# Patient Record
Sex: FEMALE | Race: WHITE | Employment: FULL TIME | URBAN - NONMETROPOLITAN AREA
[De-identification: names, ages, dates, MRNs, and addresses within clinical notes are randomized per-mention and may not be internally consistent; named-entity substitution may affect disease eponyms.]

---

## 2017-03-14 ENCOUNTER — TELEPHONE (OUTPATIENT)
Dept: OBGYN | Age: 27
End: 2017-03-14

## 2017-04-24 ENCOUNTER — TELEPHONE (OUTPATIENT)
Dept: OBGYN | Age: 27
End: 2017-04-24

## 2017-10-31 RX ORDER — DROSPIRENONE AND ETHINYL ESTRADIOL 0.03MG-3MG
1 KIT ORAL DAILY
COMMUNITY
End: 2022-12-23

## 2017-10-31 RX ORDER — ALBUTEROL SULFATE 90 UG/1
2 AEROSOL, METERED RESPIRATORY (INHALATION) EVERY 4 HOURS PRN
COMMUNITY

## 2017-10-31 RX ORDER — BROMPHENIRAMINE MALEATE, PSEUDOEPHEDRINE HYDROCHLORIDE, AND DEXTROMETHORPHAN HYDROBROMIDE 2; 30; 10 MG/5ML; MG/5ML; MG/5ML
SYRUP ORAL 4 TIMES DAILY PRN
COMMUNITY
End: 2022-12-23

## 2017-11-07 ENCOUNTER — OFFICE VISIT (OUTPATIENT)
Dept: OBGYN | Age: 27
End: 2017-11-07
Payer: COMMERCIAL

## 2017-11-07 VITALS
HEART RATE: 97 BPM | WEIGHT: 183 LBS | TEMPERATURE: 99.5 F | DIASTOLIC BLOOD PRESSURE: 87 MMHG | HEIGHT: 63 IN | SYSTOLIC BLOOD PRESSURE: 133 MMHG | BODY MASS INDEX: 32.43 KG/M2

## 2017-11-07 DIAGNOSIS — Z12.4 SCREENING FOR CERVICAL CANCER: ICD-10-CM

## 2017-11-07 DIAGNOSIS — Z01.419 WELL FEMALE EXAM WITH ROUTINE GYNECOLOGICAL EXAM: Primary | ICD-10-CM

## 2017-11-07 DIAGNOSIS — N92.6 IRREGULAR PERIODS: ICD-10-CM

## 2017-11-07 LAB
CONTROL: NORMAL
PREGNANCY TEST URINE, POC: NEGATIVE

## 2017-11-07 PROCEDURE — 99395 PREV VISIT EST AGE 18-39: CPT | Performed by: OBSTETRICS & GYNECOLOGY

## 2017-11-07 PROCEDURE — 81025 URINE PREGNANCY TEST: CPT | Performed by: OBSTETRICS & GYNECOLOGY

## 2017-11-07 ASSESSMENT — ENCOUNTER SYMPTOMS
EYES NEGATIVE: 1
RESPIRATORY NEGATIVE: 1
GASTROINTESTINAL NEGATIVE: 1

## 2017-11-07 NOTE — PATIENT INSTRUCTIONS
Patient Education        Well Visit, Ages 25 to 48: Care Instructions  Your Care Instructions  Physical exams can help you stay healthy. Your doctor has checked your overall health and may have suggested ways to take good care of yourself. He or she also may have recommended tests. At home, you can help prevent illness with healthy eating, regular exercise, and other steps. Follow-up care is a key part of your treatment and safety. Be sure to make and go to all appointments, and call your doctor if you are having problems. It's also a good idea to know your test results and keep a list of the medicines you take. How can you care for yourself at home? · Reach and stay at a healthy weight. This will lower your risk for many problems, such as obesity, diabetes, heart disease, and high blood pressure. · Get at least 30 minutes of physical activity on most days of the week. Walking is a good choice. You also may want to do other activities, such as running, swimming, cycling, or playing tennis or team sports. Discuss any changes in your exercise program with your doctor. · Do not smoke or allow others to smoke around you. If you need help quitting, talk to your doctor about stop-smoking programs and medicines. These can increase your chances of quitting for good. · Talk to your doctor about whether you have any risk factors for sexually transmitted infections (STIs). Having one sex partner (who does not have STIs and does not have sex with anyone else) is a good way to avoid these infections. · Use birth control if you do not want to have children at this time. Talk with your doctor about the choices available and what might be best for you. · Protect your skin from too much sun. When you're outdoors from 10 a.m. to 4 p.m., stay in the shade or cover up with clothing and a hat with a wide brim. Wear sunglasses that block UV rays.  Even when it's cloudy, put broad-spectrum sunscreen (SPF 30 or higher) on any exposed skin. · See a dentist one or two times a year for checkups and to have your teeth cleaned. · Wear a seat belt in the car. · Drink alcohol in moderation, if at all. That means no more than 2 drinks a day for men and 1 drink a day for women. Follow your doctor's advice about when to have certain tests. These tests can spot problems early. For everyone  · Cholesterol. Have the fat (cholesterol) in your blood tested after age 21. Your doctor will tell you how often to have this done based on your age, family history, or other things that can increase your risk for heart disease. · Blood pressure. Have your blood pressure checked during a routine doctor visit. Your doctor will tell you how often to check your blood pressure based on your age, your blood pressure results, and other factors. · Vision. Talk with your doctor about how often to have a glaucoma test.  · Diabetes. Ask your doctor whether you should have tests for diabetes. · Colon cancer. Have a test for colon cancer at age 48. You may have one of several tests. If you are younger than 48, you may need a test earlier if you have any risk factors. Risk factors include whether you already had a precancerous polyp removed from your colon or whether your parent, brother, sister, or child has had colon cancer. For women  · Breast exam and mammogram. Talk to your doctor about when you should have a clinical breast exam and a mammogram. Medical experts differ on whether and how often women under 50 should have these tests. Your doctor can help you decide what is right for you. · Pap test and pelvic exam. Begin Pap tests at age 24. A Pap test is the best way to find cervical cancer. The test often is part of a pelvic exam. Ask how often to have this test.  · Tests for sexually transmitted infections (STIs). Ask whether you should have tests for STIs.  You may be at risk if you have sex with more than one person, especially if your partners do not wear condoms. For men  · Tests for sexually transmitted infections (STIs). Ask whether you should have tests for STIs. You may be at risk if you have sex with more than one person, especially if you do not wear a condom. · Testicular cancer exam. Ask your doctor whether you should check your testicles regularly. · Prostate exam. Talk to your doctor about whether you should have a blood test (called a PSA test) for prostate cancer. Experts differ on whether and when men should have this test. Some experts suggest it if you are older than 39 and are -American or have a father or brother who got prostate cancer when he was younger than 72. When should you call for help? Watch closely for changes in your health, and be sure to contact your doctor if you have any problems or symptoms that concern you. Where can you learn more? Go to https://STAR FESTIVALpelalyeb.healthDatacastle. org and sign in to your [employer] account. Enter P072 in the Minglebox box to learn more about \"Well Visit, Ages 25 to 48: Care Instructions. \"     If you do not have an account, please click on the \"Sign Up Now\" link. Current as of: July 19, 2016  Content Version: 11.3  © 0413-9389 [employer]. Care instructions adapted under license by [facility] ([facility]). If you have questions about a medical condition or this instruction, always ask your healthcare professional. [address]  any warranty or liability for your use of this information.

## 2017-11-10 ENCOUNTER — TELEPHONE (OUTPATIENT)
Dept: OBGYN | Age: 27
End: 2017-11-10

## 2017-11-10 DIAGNOSIS — R79.89 ELEVATED PROLACTIN LEVEL: Primary | ICD-10-CM

## 2017-11-10 RX ORDER — ERGOCALCIFEROL 1.25 MG/1
50000 CAPSULE ORAL WEEKLY
Qty: 8 CAPSULE | Refills: 0 | Status: SHIPPED | OUTPATIENT
Start: 2017-11-10 | End: 2017-12-21 | Stop reason: ALTCHOICE

## 2017-11-15 ENCOUNTER — TELEPHONE (OUTPATIENT)
Dept: OBGYN | Age: 27
End: 2017-11-15

## 2017-11-16 ENCOUNTER — HOSPITAL ENCOUNTER (OUTPATIENT)
Dept: ULTRASOUND IMAGING | Age: 27
Discharge: HOME | End: 2017-11-16
Payer: COMMERCIAL

## 2017-11-16 DIAGNOSIS — N92.6 IRREGULAR PERIODS: ICD-10-CM

## 2017-11-16 PROCEDURE — 76830 TRANSVAGINAL US NON-OB: CPT

## 2017-12-08 ENCOUNTER — OFFICE VISIT (OUTPATIENT)
Dept: OBGYN | Age: 27
End: 2017-12-08
Payer: COMMERCIAL

## 2017-12-08 VITALS
WEIGHT: 188 LBS | SYSTOLIC BLOOD PRESSURE: 139 MMHG | BODY MASS INDEX: 33.31 KG/M2 | HEIGHT: 63 IN | DIASTOLIC BLOOD PRESSURE: 90 MMHG | HEART RATE: 101 BPM

## 2017-12-08 DIAGNOSIS — O36.80X0 ENCOUNTER TO DETERMINE FETAL VIABILITY OF PREGNANCY, SINGLE OR UNSPECIFIED FETUS: ICD-10-CM

## 2017-12-08 DIAGNOSIS — N91.2 AMENORRHEA: Primary | ICD-10-CM

## 2017-12-08 LAB
CONTROL: ABNORMAL
PREGNANCY TEST URINE, POC: ABNORMAL

## 2017-12-08 PROCEDURE — 81025 URINE PREGNANCY TEST: CPT | Performed by: NURSE PRACTITIONER

## 2017-12-08 PROCEDURE — 99213 OFFICE O/P EST LOW 20 MIN: CPT | Performed by: NURSE PRACTITIONER

## 2017-12-08 ASSESSMENT — ENCOUNTER SYMPTOMS
RESPIRATORY NEGATIVE: 1
EYES NEGATIVE: 1
GASTROINTESTINAL NEGATIVE: 1

## 2017-12-08 NOTE — PROGRESS NOTES
high level of mercury, such as shark, swordfish, or mackerel. Do not eat more than one can of tuna each week. ? · Drink plenty of fluids, enough so that your urine is light yellow or clear like water. If you have kidney, heart, or liver disease and have to limit fluids, talk with your doctor before you increase the amount of fluids you drink. ? · Cut down on caffeine, such as coffee, tea, and cola. ? · Do not drink alcohol, such as beer, wine, or hard liquor. ? · Take a multivitamin that contains at least 400 micrograms (mcg) of folic acid to help prevent birth defects. Fortified cereal and whole wheat bread are good additional sources of folic acid. ? · Increase the calcium in your diet. Try to drink a quart of skim milk each day. You may also take calcium supplements and choose foods such as cheese and yogurt. ? Lifestyle  ? · Make sure you go to your follow-up appointments. ? · Get plenty of rest. You may be unusually tired while you are pregnant. ? · Get at least 30 minutes of exercise on most days of the week. Walking is a good choice. If you have not exercised in the past, start out slowly. Take several short walks each day. ? · Do not smoke. If you need help quitting, talk to your doctor about stop-smoking programs. These can increase your chances of quitting for good. ? · Do not touch cat feces or litter boxes. Also, wash your hands after you handle raw meat, and fully cook all meat before you eat it. Wear gloves when you work in the yard or garden, and wash your hands well when you are done. Cat feces, raw or undercooked meat, and contaminated dirt can cause an infection that may harm your baby or lead to a miscarriage. ? · Do not use saunas or hot tubs. Raising your body temperature may harm your baby. ? · Avoid chemical fumes, paint fumes, or poisons. ? · Do not use illegal drugs or alcohol. Medicines  ? · Review all of your medicines with your doctor.  Some of your routine on the \"Sign Up Now\" link. Current as of: March 16, 2017  Content Version: 11.4  © 4555-6374 [employer], Incorporated. Care instructions adapted under license by [facility] ([facility]). If you have questions about a medical condition or this instruction, always ask your healthcare professional. [place] [address] any warranty or liability for your use of this information.

## 2017-12-08 NOTE — PATIENT INSTRUCTIONS
Patient Education        Learning About Pregnancy  Your Care Instructions    Your health in the early weeks of your pregnancy is particularly important for your baby's health. Take good care of yourself. Anything you do that harms your body can also harm your baby. Make sure to go to all of your doctor appointments. Regular checkups will help keep you and your baby healthy. How can you care for yourself at home? Diet  ? · Eat a balanced diet. Make sure your diet includes plenty of beans, peas, and leafy green vegetables. ? · Do not skip meals or go for many hours without eating. If you are nauseated, try to eat a small, healthy snack every 2 to 3 hours. ? · Do not eat fish that has a high level of mercury, such as shark, swordfish, or mackerel. Do not eat more than one can of tuna each week. ? · Drink plenty of fluids, enough so that your urine is light yellow or clear like water. If you have kidney, heart, or liver disease and have to limit fluids, talk with your doctor before you increase the amount of fluids you drink. ? · Cut down on caffeine, such as coffee, tea, and cola. ? · Do not drink alcohol, such as beer, wine, or hard liquor. ? · Take a multivitamin that contains at least 400 micrograms (mcg) of folic acid to help prevent birth defects. Fortified cereal and whole wheat bread are good additional sources of folic acid. ? · Increase the calcium in your diet. Try to drink a quart of skim milk each day. You may also take calcium supplements and choose foods such as cheese and yogurt. ? Lifestyle  ? · Make sure you go to your follow-up appointments. ? · Get plenty of rest. You may be unusually tired while you are pregnant. ? · Get at least 30 minutes of exercise on most days of the week. Walking is a good choice. If you have not exercised in the past, start out slowly. Take several short walks each day. ? · Do not smoke.  If you need help quitting, talk to your doctor about stop-smoking programs. These can increase your chances of quitting for good. ? · Do not touch cat feces or litter boxes. Also, wash your hands after you handle raw meat, and fully cook all meat before you eat it. Wear gloves when you work in the yard or garden, and wash your hands well when you are done. Cat feces, raw or undercooked meat, and contaminated dirt can cause an infection that may harm your baby or lead to a miscarriage. ? · Do not use saunas or hot tubs. Raising your body temperature may harm your baby. ? · Avoid chemical fumes, paint fumes, or poisons. ? · Do not use illegal drugs or alcohol. Medicines  ? · Review all of your medicines with your doctor. Some of your routine medicines may need to be changed to protect your baby. ? · Use acetaminophen (Tylenol) to relieve minor problems, such as a mild headache or backache or a mild fever with cold symptoms. Do not use nonsteroidal anti-inflammatory drugs (NSAIDs), such as ibuprofen (Advil, Motrin) or naproxen (Aleve), unless your doctor says it is okay. ? · Do not take two or more pain medicines at the same time unless the doctor told you to. Many pain medicines have acetaminophen, which is Tylenol. Too much acetaminophen (Tylenol) can be harmful. ? · Take your medicines exactly as prescribed. Call your doctor if you think you are having a problem with your medicine. ?To manage morning sickness  ? · If you feel sick when you first wake up, try eating a small snack (such as crackers) before you get out of bed. Allow some time to digest the snack, and then get out of bed slowly. ? · Do not skip meals or go for long periods without eating. An empty stomach can make nausea worse. ? · Eat small, frequent meals instead of three large meals each day. ? · Drink plenty of fluids. Sports drinks, such as Gatorade or Powerade, are good choices. ? · Eat foods that are high in protein but low in fat.    ? · If you are taking iron supplements, ask your

## 2017-12-20 ENCOUNTER — LAB (OUTPATIENT)
Dept: LAB | Facility: HOSPITAL | Age: 27
End: 2017-12-20

## 2017-12-20 ENCOUNTER — TRANSCRIBE ORDERS (OUTPATIENT)
Dept: ADMINISTRATIVE | Facility: HOSPITAL | Age: 27
End: 2017-12-20

## 2017-12-20 DIAGNOSIS — R30.0 DYSURIA: Primary | ICD-10-CM

## 2017-12-20 DIAGNOSIS — R30.0 DYSURIA: ICD-10-CM

## 2017-12-20 LAB
BACTERIA UR QL AUTO: ABNORMAL /HPF
BILIRUB UR QL STRIP: NEGATIVE
CLARITY UR: CLEAR
COLOR UR: YELLOW
GLUCOSE UR STRIP-MCNC: NEGATIVE MG/DL
HGB UR QL STRIP.AUTO: ABNORMAL
HYALINE CASTS UR QL AUTO: ABNORMAL /LPF
KETONES UR QL STRIP: NEGATIVE
LEUKOCYTE ESTERASE UR QL STRIP.AUTO: NEGATIVE
NITRITE UR QL STRIP: NEGATIVE
PH UR STRIP.AUTO: 6.5 [PH] (ref 5–8)
PROT UR QL STRIP: NEGATIVE
RBC # UR: ABNORMAL /HPF
REF LAB TEST METHOD: ABNORMAL
SP GR UR STRIP: <=1.005 (ref 1–1.03)
SQUAMOUS #/AREA URNS HPF: ABNORMAL /HPF
UROBILINOGEN UR QL STRIP: ABNORMAL
WBC UR QL AUTO: ABNORMAL /HPF

## 2017-12-20 PROCEDURE — 81001 URINALYSIS AUTO W/SCOPE: CPT

## 2017-12-21 ENCOUNTER — HOSPITAL ENCOUNTER (OUTPATIENT)
Dept: ULTRASOUND IMAGING | Age: 27
Discharge: HOME | End: 2017-12-21
Payer: COMMERCIAL

## 2017-12-21 ENCOUNTER — ROUTINE PRENATAL (OUTPATIENT)
Dept: OBGYN | Age: 27
End: 2017-12-21

## 2017-12-21 VITALS
BODY MASS INDEX: 32.59 KG/M2 | HEART RATE: 107 BPM | DIASTOLIC BLOOD PRESSURE: 89 MMHG | SYSTOLIC BLOOD PRESSURE: 133 MMHG | WEIGHT: 184 LBS

## 2017-12-21 DIAGNOSIS — O36.80X0 ENCOUNTER TO DETERMINE FETAL VIABILITY OF PREGNANCY, SINGLE OR UNSPECIFIED FETUS: ICD-10-CM

## 2017-12-21 DIAGNOSIS — R31.9 HEMATURIA, UNSPECIFIED TYPE: ICD-10-CM

## 2017-12-21 DIAGNOSIS — Z3A.01 6 WEEKS GESTATION OF PREGNANCY: ICD-10-CM

## 2017-12-21 DIAGNOSIS — Z34.01 SUPERVISION OF NORMAL FIRST PREGNANCY IN FIRST TRIMESTER: ICD-10-CM

## 2017-12-21 DIAGNOSIS — Z34.01 SUPERVISION OF NORMAL FIRST PREGNANCY IN FIRST TRIMESTER: Primary | ICD-10-CM

## 2017-12-21 LAB — GONADOTROPIN, CHORIONIC (HCG) QUANT: 6120 MIU/ML (ref 0–5.3)

## 2017-12-21 PROCEDURE — 0500F INITIAL PRENATAL CARE VISIT: CPT | Performed by: NURSE PRACTITIONER

## 2017-12-21 PROCEDURE — 76817 TRANSVAGINAL US OBSTETRIC: CPT

## 2017-12-21 NOTE — PROGRESS NOTES
Pt presents today for routine prenatal exam. She denies any vaginal bleeding, leaking, or cramping. Could not get heartbeat on US today-is scheduled for another 1/2/18. She is c/o seeing light pink in toilet. No bleeding. She does have some cramping. She did a urine yesterday that showed large blood. She does have a history of UTIs. Will check HCG with NOB labs today. Will repeat HCG tomorrow due to holidays and for peace of mind for pt. Will send urine for a culture as well. Pt to call if any changes or bleeding.

## 2017-12-21 NOTE — PATIENT INSTRUCTIONS
folate. ¨ Dairy products, tofu, canned fish with bones, almonds, broccoli, dark leafy greens, corn tortillas, and fortified orange juice are good sources of calcium. ¨ Beef, poultry, liver, spinach, lentils, dried beans, fortified cereals, and dried fruits are rich in iron. ¨ Dark leafy greens, broccoli, asparagus, liver, fortified cereals, orange juice, peanuts, and almonds are good sources of folate. · Avoid foods that could harm your baby. ¨ Do not eat raw or undercooked meat, chicken, or fish (such as sushi or raw oysters). ¨ Do not eat raw eggs or foods that contain raw eggs, such as Caesar dressing. ¨ Do not eat soft cheeses and unpasteurized dairy foods, such as Brie, feta, or blue cheese. ¨ Do not eat fish that contains a lot of mercury, such as shark, swordfish, tilefish, or twin mackerel. Do not eat more than 6 ounces of tuna each week. ¨ Do not eat raw sprouts, especially alfalfa sprouts. ¨ Cut down on caffeine, such as coffee, tea, and cola. Protect yourself and your baby  · Do not touch anabela litter or cat feces. They can cause an infection that could harm your baby. · High body temperature can be harmful to your baby. So if you want to use a sauna or hot tub, be sure to talk to your doctor about how to use it safely. [place] with morning sickness  · Sip small amounts of water, juices, or shakes. Try drinking between meals, not with meals. · Eat 5 or 6 small meals a day. Try dry toast or crackers when you first get up, and eat breakfast a little later. · Avoid spicy, greasy, and fatty foods. · When you feel sick, open your windows or go for a short walk to get fresh air. · Try nausea wristbands. These help some women. · Tell your doctor if you think your prenatal vitamins make you sick. Where can you learn more? Go to https://carmencita.healthmobile melting [employer]. org and sign in to your MyMusic account.  Enter G112 in the [employer] Apps box to learn more about \"Weeks 6 to 10 of Your Pregnancy: Care Instructions. \"     If you do not have an account, please click on the \"Sign Up Now\" link. Current as of: March 16, 2017  Content Version: 11.4  © 9173-3262 Healthwise, Incorporated. Care instructions adapted under license by [facility] ([facility]). If you have questions about a medical condition or this instruction, always ask your healthcare professional. [address] any warranty or liability for your use of this information.

## 2017-12-22 ENCOUNTER — TELEPHONE (OUTPATIENT)
Dept: OBGYN | Age: 27
End: 2017-12-22

## 2017-12-22 DIAGNOSIS — Z34.01 SUPERVISION OF NORMAL FIRST PREGNANCY IN FIRST TRIMESTER: ICD-10-CM

## 2017-12-22 DIAGNOSIS — R31.9 HEMATURIA, UNSPECIFIED TYPE: ICD-10-CM

## 2017-12-22 LAB — GONADOTROPIN, CHORIONIC (HCG) QUANT: 7010 MIU/ML (ref 0–5.3)

## 2018-01-02 ENCOUNTER — ROUTINE PRENATAL (OUTPATIENT)
Dept: OBGYN | Age: 28
End: 2018-01-02

## 2018-01-02 ENCOUNTER — HOSPITAL ENCOUNTER (OUTPATIENT)
Dept: ULTRASOUND IMAGING | Age: 28
Discharge: HOME | End: 2018-01-02
Payer: COMMERCIAL

## 2018-01-02 VITALS — SYSTOLIC BLOOD PRESSURE: 140 MMHG | BODY MASS INDEX: 33.18 KG/M2 | WEIGHT: 187.3 LBS | DIASTOLIC BLOOD PRESSURE: 87 MMHG

## 2018-01-02 DIAGNOSIS — O36.80X0 ENCOUNTER TO DETERMINE FETAL VIABILITY OF PREGNANCY, SINGLE OR UNSPECIFIED FETUS: ICD-10-CM

## 2018-01-02 DIAGNOSIS — Z3A.08 8 WEEKS GESTATION OF PREGNANCY: ICD-10-CM

## 2018-01-02 DIAGNOSIS — Z34.01 SUPERVISION OF NORMAL FIRST PREGNANCY IN FIRST TRIMESTER: Primary | ICD-10-CM

## 2018-01-02 DIAGNOSIS — O28.9 ABNORMAL FINDINGS ON ANTENATAL SCREENING: ICD-10-CM

## 2018-01-02 PROCEDURE — 76817 TRANSVAGINAL US OBSTETRIC: CPT

## 2018-01-02 PROCEDURE — 0502F SUBSEQUENT PRENATAL CARE: CPT | Performed by: NURSE PRACTITIONER

## 2018-01-02 NOTE — PATIENT INSTRUCTIONS
Patient Education        Learning About Pregnancy  Your Care Instructions    Your health in the early weeks of your pregnancy is particularly important for your baby's health. Take good care of yourself. Anything you do that harms your body can also harm your baby. Make sure to go to all of your doctor appointments. Regular checkups will help keep you and your baby healthy. How can you care for yourself at home? Diet  ? · Eat a balanced diet. Make sure your diet includes plenty of beans, peas, and leafy green vegetables. ? · Do not skip meals or go for many hours without eating. If you are nauseated, try to eat a small, healthy snack every 2 to 3 hours. ? · Do not eat fish that has a high level of mercury, such as shark, swordfish, or mackerel. Do not eat more than one can of tuna each week. ? · Drink plenty of fluids, enough so that your urine is light yellow or clear like water. If you have kidney, heart, or liver disease and have to limit fluids, talk with your doctor before you increase the amount of fluids you drink. ? · Cut down on caffeine, such as coffee, tea, and cola. ? · Do not drink alcohol, such as beer, wine, or hard liquor. ? · Take a multivitamin that contains at least 400 micrograms (mcg) of folic acid to help prevent birth defects. Fortified cereal and whole wheat bread are good additional sources of folic acid. ? · Increase the calcium in your diet. Try to drink a quart of skim milk each day. You may also take calcium supplements and choose foods such as cheese and yogurt. ? Lifestyle  ? · Make sure you go to your follow-up appointments. ? · Get plenty of rest. You may be unusually tired while you are pregnant. ? · Get at least 30 minutes of exercise on most days of the week. Walking is a good choice. If you have not exercised in the past, start out slowly. Take several short walks each day. ? · Do not smoke.  If you need help quitting, talk to your doctor about stop-smoking

## 2018-01-22 DIAGNOSIS — Z36.9 ENCOUNTER FOR ANTENATAL SCREENING OF MOTHER: Primary | ICD-10-CM

## 2018-01-29 DIAGNOSIS — R30.0 DYSURIA IN PREGNANCY IN FIRST TRIMESTER: Primary | ICD-10-CM

## 2018-01-29 DIAGNOSIS — R30.0 DYSURIA IN PREGNANCY IN FIRST TRIMESTER: ICD-10-CM

## 2018-01-29 DIAGNOSIS — O26.891 DYSURIA IN PREGNANCY IN FIRST TRIMESTER: Primary | ICD-10-CM

## 2018-01-29 DIAGNOSIS — O26.891 DYSURIA IN PREGNANCY IN FIRST TRIMESTER: ICD-10-CM

## 2018-01-29 LAB
BACTERIA: NEGATIVE /HPF
BILIRUBIN URINE: NEGATIVE
BLOOD, URINE: ABNORMAL
CLARITY: ABNORMAL
COLOR: YELLOW
EPITHELIAL CELLS, UA: 3 /HPF (ref 0–5)
GLUCOSE URINE: NEGATIVE MG/DL
HYALINE CASTS: 2 /HPF (ref 0–8)
KETONES, URINE: NEGATIVE MG/DL
LEUKOCYTE ESTERASE, URINE: NEGATIVE
NITRITE, URINE: NEGATIVE
PH UA: 7.5
PROTEIN UA: NEGATIVE MG/DL
RBC UA: 14 /HPF (ref 0–4)
SPECIFIC GRAVITY UA: 1.02
UROBILINOGEN, URINE: 0.2 E.U./DL
WBC UA: 2 /HPF (ref 0–5)

## 2018-01-29 RX ORDER — NITROFURANTOIN 25; 75 MG/1; MG/1
100 CAPSULE ORAL 2 TIMES DAILY
Qty: 20 CAPSULE | Refills: 0 | Status: SHIPPED | OUTPATIENT
Start: 2018-01-29 | End: 2018-02-02 | Stop reason: ALTCHOICE

## 2018-01-30 ENCOUNTER — TELEPHONE (OUTPATIENT)
Dept: OBGYN | Age: 28
End: 2018-01-30

## 2018-01-31 LAB — URINE CULTURE, ROUTINE: NORMAL

## 2018-02-02 ENCOUNTER — ROUTINE PRENATAL (OUTPATIENT)
Dept: OBGYN | Age: 28
End: 2018-02-02

## 2018-02-02 VITALS
WEIGHT: 186 LBS | SYSTOLIC BLOOD PRESSURE: 134 MMHG | DIASTOLIC BLOOD PRESSURE: 86 MMHG | BODY MASS INDEX: 32.95 KG/M2 | HEART RATE: 95 BPM

## 2018-02-02 DIAGNOSIS — Z34.01 ENCOUNTER FOR SUPERVISION OF NORMAL FIRST PREGNANCY IN FIRST TRIMESTER: Primary | ICD-10-CM

## 2018-02-02 LAB
ABO/RH: NORMAL
ANTIBODY SCREEN: NORMAL
CHLAMYDIA TRACHOMATIS AMPLIFIED DET: NORMAL
HEPATITIS B SURFACE ANTIGEN INTERPRETATION: NORMAL
HIV-1 AND HIV-2 ANTIBODIES: NORMAL
MS ALPHA-FETOPROTEIN: NORMAL
N GONORRHOEAE AMPLIFIED DET: NORMAL
RUBELLA ANTIBODY IGG: NORMAL

## 2018-02-02 PROCEDURE — 0502F SUBSEQUENT PRENATAL CARE: CPT | Performed by: OBSTETRICS & GYNECOLOGY

## 2018-02-28 ENCOUNTER — ROUTINE PRENATAL (OUTPATIENT)
Dept: OBGYN | Age: 28
End: 2018-02-28

## 2018-02-28 VITALS
BODY MASS INDEX: 33.48 KG/M2 | WEIGHT: 189 LBS | DIASTOLIC BLOOD PRESSURE: 81 MMHG | SYSTOLIC BLOOD PRESSURE: 137 MMHG | HEART RATE: 107 BPM

## 2018-02-28 DIAGNOSIS — Z34.02 ENCOUNTER FOR SUPERVISION OF NORMAL FIRST PREGNANCY IN SECOND TRIMESTER: Primary | ICD-10-CM

## 2018-02-28 PROCEDURE — 0502F SUBSEQUENT PRENATAL CARE: CPT | Performed by: OBSTETRICS & GYNECOLOGY

## 2018-03-27 ENCOUNTER — ROUTINE PRENATAL (OUTPATIENT)
Dept: OBGYN | Age: 28
End: 2018-03-27

## 2018-03-27 VITALS
SYSTOLIC BLOOD PRESSURE: 127 MMHG | DIASTOLIC BLOOD PRESSURE: 82 MMHG | WEIGHT: 193 LBS | BODY MASS INDEX: 34.19 KG/M2 | HEART RATE: 102 BPM

## 2018-03-27 DIAGNOSIS — Z34.02 ENCOUNTER FOR SUPERVISION OF NORMAL FIRST PREGNANCY IN SECOND TRIMESTER: Primary | ICD-10-CM

## 2018-03-27 PROCEDURE — 0502F SUBSEQUENT PRENATAL CARE: CPT | Performed by: OBSTETRICS & GYNECOLOGY

## 2018-03-27 NOTE — PROGRESS NOTES
Pt presents today for a routine prenatal visit. Pt denies vaginal bleeding, leaking of fluid or cramping. Pt seen MFM and anatomy scan completed. All questions answered.

## 2018-04-24 ENCOUNTER — ROUTINE PRENATAL (OUTPATIENT)
Dept: OBGYN | Age: 28
End: 2018-04-24

## 2018-04-24 VITALS
BODY MASS INDEX: 35.07 KG/M2 | SYSTOLIC BLOOD PRESSURE: 137 MMHG | DIASTOLIC BLOOD PRESSURE: 80 MMHG | HEART RATE: 97 BPM | WEIGHT: 198 LBS

## 2018-04-24 DIAGNOSIS — Z34.02 ENCOUNTER FOR SUPERVISION OF NORMAL FIRST PREGNANCY IN SECOND TRIMESTER: Primary | ICD-10-CM

## 2018-04-24 PROCEDURE — 0502F SUBSEQUENT PRENATAL CARE: CPT | Performed by: OBSTETRICS & GYNECOLOGY

## 2018-05-24 ENCOUNTER — ROUTINE PRENATAL (OUTPATIENT)
Dept: OBGYN | Age: 28
End: 2018-05-24

## 2018-05-24 VITALS
DIASTOLIC BLOOD PRESSURE: 88 MMHG | WEIGHT: 198 LBS | SYSTOLIC BLOOD PRESSURE: 130 MMHG | HEART RATE: 110 BPM | BODY MASS INDEX: 35.07 KG/M2

## 2018-05-24 DIAGNOSIS — O36.1130 ISOIMMUNIZATION FROM BLOOD GROUP INCOMPATIBILITY DURING PREGNANCY IN THIRD TRIMESTER, SINGLE OR UNSPECIFIED FETUS: ICD-10-CM

## 2018-05-24 DIAGNOSIS — O09.93 HIGH-RISK PREGNANCY IN THIRD TRIMESTER: Primary | ICD-10-CM

## 2018-05-24 PROCEDURE — 0502F SUBSEQUENT PRENATAL CARE: CPT | Performed by: NURSE PRACTITIONER

## 2018-06-14 ENCOUNTER — ROUTINE PRENATAL (OUTPATIENT)
Dept: OBGYN | Age: 28
End: 2018-06-14

## 2018-06-14 VITALS
DIASTOLIC BLOOD PRESSURE: 82 MMHG | SYSTOLIC BLOOD PRESSURE: 144 MMHG | BODY MASS INDEX: 35.78 KG/M2 | HEART RATE: 117 BPM | WEIGHT: 202 LBS

## 2018-06-14 DIAGNOSIS — Z34.03 ENCOUNTER FOR SUPERVISION OF NORMAL FIRST PREGNANCY IN THIRD TRIMESTER: Primary | ICD-10-CM

## 2018-06-14 PROCEDURE — 0502F SUBSEQUENT PRENATAL CARE: CPT | Performed by: OBSTETRICS & GYNECOLOGY

## 2018-06-29 ENCOUNTER — ROUTINE PRENATAL (OUTPATIENT)
Dept: OBGYN | Age: 28
End: 2018-06-29

## 2018-06-29 VITALS
DIASTOLIC BLOOD PRESSURE: 86 MMHG | WEIGHT: 204 LBS | HEART RATE: 108 BPM | SYSTOLIC BLOOD PRESSURE: 130 MMHG | BODY MASS INDEX: 36.14 KG/M2

## 2018-06-29 DIAGNOSIS — Z34.03 ENCOUNTER FOR SUPERVISION OF NORMAL FIRST PREGNANCY IN THIRD TRIMESTER: Primary | ICD-10-CM

## 2018-06-29 PROCEDURE — 0502F SUBSEQUENT PRENATAL CARE: CPT | Performed by: NURSE PRACTITIONER

## 2018-07-10 ENCOUNTER — ROUTINE PRENATAL (OUTPATIENT)
Dept: OBGYN | Age: 28
End: 2018-07-10

## 2018-07-10 VITALS
BODY MASS INDEX: 36.67 KG/M2 | SYSTOLIC BLOOD PRESSURE: 139 MMHG | DIASTOLIC BLOOD PRESSURE: 90 MMHG | HEART RATE: 96 BPM | WEIGHT: 207 LBS

## 2018-07-10 DIAGNOSIS — Z34.03 ENCOUNTER FOR SUPERVISION OF NORMAL FIRST PREGNANCY IN THIRD TRIMESTER: Primary | ICD-10-CM

## 2018-07-10 PROCEDURE — 0502F SUBSEQUENT PRENATAL CARE: CPT | Performed by: OBSTETRICS & GYNECOLOGY

## 2018-07-10 NOTE — PROGRESS NOTES
Patient presents today for a routine prenatal visit. Pt denies any vaginal leaking bleeding or contractions. + Fetal movement. GBS obtained. Labor precautions given. Pt will continue to monitor b/p at home. All questions answered.

## 2018-07-10 NOTE — PATIENT INSTRUCTIONS
Patient Education        Weeks 34 to 39 of Your Pregnancy: Care Instructions  Your Care Instructions    By now, your baby and your belly have grown quite large. It is almost time to give birth. A full-term pregnancy can deliver between 37 and 42 weeks. Your baby's lungs are almost ready to breathe air. The bones in your baby's head are now firm enough to protect it, but soft enough to move down through the birth canal.  You may feel excited, happy, anxious, or scared. You may wonder how you will know if you are in labor or what to expect during labor. Try to be flexible in your expectations of the birth. Because each birth is different, there is no way to know exactly what childbirth will be like for you. This care sheet will help you know what to expect and how to prepare. This may make your childbirth easier. If you haven't already had the Tdap shot during this pregnancy, talk to your doctor about getting it. It will help protect your  against pertussis infection. In the 36th week, most women have a test for group B streptococcus (GBS). GBS is a common bacteria that can live in the vagina and rectum. It can make your baby sick after birth. If you test positive, you will get antibiotics during labor. The medicine will keep your baby from getting the bacteria. Follow-up care is a key part of your treatment and safety. Be sure to make and go to all appointments, and call your doctor if you are having problems. It's also a good idea to know your test results and keep a list of the medicines you take. How can you care for yourself at home? Learn about pain relief choices  · Pain is different for every woman. Talk with your doctor about your feelings about pain. · You can choose from several types of pain relief. These include medicine or breathing techniques, as well as comfort measures. You can use more than one option.   · If you choose to have pain medicine during labor, talk to your doctor about your options. Some medicines lower anxiety and help with some of the pain. Others make your lower body numb so that you won't feel pain. · Be sure to tell your doctor about your pain medicine choice before you start labor or very early in your labor. You may be able to change your mind as labor progresses. · Rarely, a woman is put to sleep by medicine given through a mask or an IV. Labor and delivery  · The first stage of labor has three parts: early, active, and transition. ¨ Most women have early labor at home. You can stay busy or rest, eat light snacks, drink clear fluids, and start counting contractions. ¨ When talking during a contraction gets hard, you may be moving to active labor. During active labor, you should head for the hospital if you are not there already. ¨ You are in active labor when contractions come every 3 to 4 minutes and last about 60 seconds. Your cervix is opening more rapidly. ¨ If your water breaks, contractions will come faster and stronger. ¨ During transition, your cervix is stretching, and contractions are coming more rapidly. ¨ You may want to push, but your cervix might not be ready. Your doctor will tell you when to push. · The second stage starts when your cervix is completely opened and you are ready to push. ¨ Contractions are very strong to push the baby down the birth canal.  ¨ You will feel the urge to push. You may feel like you need to have a bowel movement. ¨ You may be coached to push with contractions. These contractions will be very strong, but you will not have them as often. You can get a little rest between contractions. ¨ You may be emotional and irritable. You may not be aware of what is going on around you. ¨ One last push, and your baby is born. · The third stage is when a few more contractions push out the placenta. This may take 30 minutes or less. · The fourth stage is the welcome recovery.  You may feel overwhelmed with emotions and exhausted but

## 2018-07-19 ENCOUNTER — ROUTINE PRENATAL (OUTPATIENT)
Dept: OBGYN | Age: 28
End: 2018-07-19

## 2018-07-19 VITALS
SYSTOLIC BLOOD PRESSURE: 138 MMHG | DIASTOLIC BLOOD PRESSURE: 86 MMHG | BODY MASS INDEX: 36.85 KG/M2 | WEIGHT: 208 LBS | HEART RATE: 84 BPM

## 2018-07-19 DIAGNOSIS — Z34.03 ENCOUNTER FOR SUPERVISION OF NORMAL FIRST PREGNANCY IN THIRD TRIMESTER: Primary | ICD-10-CM

## 2018-07-19 PROCEDURE — 0502F SUBSEQUENT PRENATAL CARE: CPT | Performed by: OBSTETRICS & GYNECOLOGY

## 2018-07-19 NOTE — PATIENT INSTRUCTIONS
Patient Education        Week 37 of Your Pregnancy: Care Instructions  Your Care Instructions    You are near the end of your pregnancy-and you're probably pretty uncomfortable. It may be harder to walk around. Lying down probably isn't comfortable either. You may have trouble getting to sleep or staying asleep. Most women deliver their babies between 40 and 41 weeks. This is a good time to think about packing a bag for the hospital with items you'll need. Then you'll be ready when labor starts. Follow-up care is a key part of your treatment and safety. Be sure to make and go to all appointments, and call your doctor if you are having problems. It's also a good idea to know your test results and keep a list of the medicines you take. How can you care for yourself at home? Learn about breastfeeding  · Breastfeeding is best for your baby and good for you. · Breast milk has antibodies to help your baby fight infections. · Mothers who breastfeed often lose weight faster, because making milk burns calories. · Learning the best ways to hold your baby will make breastfeeding easier. · Let your partner bathe and diaper the baby to keep your partner from feeling left out. Snuggle together when you breastfeed. · You may want to learn how to use a breast pump and store your milk. · If you choose to bottle feed, make the feeding feel like breastfeeding so you can bond with your baby. Always hold your baby and the bottle. Do not prop bottles or let your baby fall asleep with a bottle. Learn about crying  · It is common for babies to cry for 1 to 3 hours a day. Some cry more, some cry less. · Babies don't cry to make you upset or because you are a bad parent. · Crying is how your baby communicates. Your baby may be hungry; have gas; need a diaper change; or feel cold, warm, tired, lonely, or tense. Sometimes babies cry for unknown reasons.   · If you respond to your baby's needs, he or she will learn to trust you.  · Try to stay calm when your baby cries. Your baby may get more upset if he or she senses that you are upset. Know how to care for your   · Your baby's umbilical cord stump will drop off on its own, usually between 1 and 2 weeks. To care for your baby's umbilical cord area:  ¨ Clean the area at the bottom of the cord 2 or 3 times a day. ¨ Pay special attention to the area where the cord attaches to the skin. ¨ Keep the diaper folded below the cord. ¨ Use a damp washcloth or cotton ball to sponge bathe your baby until the stump has come off. · Your baby's first dark stool is called meconium. After the meconium is passed, your baby will develop his or her own bowel pattern. ¨ Some babies, especially  babies, have several bowel movements a day. Others have one or two a day, or one every 2 to 3 days. ¨  babies often have loose, yellow stools. Formula-fed babies have more formed stools. ¨ If your baby's stools look like little pellets, he or she is constipated. After 2 days of constipation, call your baby's doctor. · If your baby will be circumcised, you can care for him at home. ¨ Gently rinse his penis with warm water after every diaper change. Do not try to remove the film that forms on the penis. This film will go away on its own. Pat dry. ¨ Put petroleum ointment, such as Vaseline, on the area of the diaper that will touch your baby's penis. This will keep the diaper from sticking to your baby. ¨ Ask the doctor about giving your baby acetaminophen (Tylenol) for pain. Where can you learn more? Go to https://chvernoneb.healthZivame.com. org and sign in to your [employer] account. Enter 68  97 in the [employer] box to learn more about \"Week 37 of Your Pregnancy: Care Instructions. \"     If you do not have an account, please click on the \"Sign Up Now\" link. Current as of: 2017  Content Version: 11.6  © 3691-5455 [employer], [facility].  Care

## 2018-07-21 LAB
GROUP B STREP CULTURE: ABNORMAL
GROUP B STREP CULTURE: ABNORMAL
ORGANISM: ABNORMAL

## 2018-07-27 ENCOUNTER — HOSPITAL ENCOUNTER (OUTPATIENT)
Age: 28
Setting detail: OBSERVATION
Discharge: HOME | End: 2018-07-27
Attending: OBSTETRICS & GYNECOLOGY | Admitting: OBSTETRICS & GYNECOLOGY
Payer: COMMERCIAL

## 2018-07-27 ENCOUNTER — ROUTINE PRENATAL (OUTPATIENT)
Dept: OBGYN | Age: 28
End: 2018-07-27

## 2018-07-27 VITALS
SYSTOLIC BLOOD PRESSURE: 131 MMHG | RESPIRATION RATE: 18 BRPM | HEART RATE: 92 BPM | DIASTOLIC BLOOD PRESSURE: 93 MMHG | TEMPERATURE: 99.3 F

## 2018-07-27 VITALS
HEART RATE: 102 BPM | WEIGHT: 210 LBS | SYSTOLIC BLOOD PRESSURE: 156 MMHG | DIASTOLIC BLOOD PRESSURE: 90 MMHG | BODY MASS INDEX: 37.2 KG/M2

## 2018-07-27 DIAGNOSIS — O16.3 ELEVATED BLOOD PRESSURE AFFECTING PREGNANCY IN THIRD TRIMESTER, ANTEPARTUM: Primary | ICD-10-CM

## 2018-07-27 PROBLEM — I10 HYPERTENSION: Status: ACTIVE | Noted: 2018-07-27

## 2018-07-27 LAB
ALBUMIN SERPL-MCNC: 3.4 G/DL (ref 3.5–5.2)
ALP BLD-CCNC: 105 U/L (ref 35–104)
ALT SERPL-CCNC: 10 U/L (ref 5–33)
ANION GAP SERPL CALCULATED.3IONS-SCNC: 15 MMOL/L (ref 7–19)
AST SERPL-CCNC: 19 U/L (ref 5–32)
BASOPHILS ABSOLUTE: 0 K/UL (ref 0–0.2)
BASOPHILS RELATIVE PERCENT: 0.2 % (ref 0–1)
BILIRUB SERPL-MCNC: <0.2 MG/DL (ref 0.2–1.2)
BUN BLDV-MCNC: 10 MG/DL (ref 6–20)
CALCIUM SERPL-MCNC: 9.4 MG/DL (ref 8.6–10)
CHLORIDE BLD-SCNC: 103 MMOL/L (ref 98–111)
CO2: 19 MMOL/L (ref 22–29)
CREAT SERPL-MCNC: 0.6 MG/DL (ref 0.5–0.9)
CREATININE URINE: 107.1 MG/DL (ref 4.2–622)
EOSINOPHILS ABSOLUTE: 0.1 K/UL (ref 0–0.6)
EOSINOPHILS RELATIVE PERCENT: 0.4 % (ref 0–5)
GFR NON-AFRICAN AMERICAN: >60
GLUCOSE BLD-MCNC: 92 MG/DL (ref 74–109)
HCT VFR BLD CALC: 34.5 % (ref 37–47)
HEMOGLOBIN: 11.3 G/DL (ref 12–16)
LYMPHOCYTES ABSOLUTE: 1.8 K/UL (ref 1.1–4.5)
LYMPHOCYTES RELATIVE PERCENT: 15.1 % (ref 20–40)
MCH RBC QN AUTO: 28.3 PG (ref 27–31)
MCHC RBC AUTO-ENTMCNC: 32.8 G/DL (ref 33–37)
MCV RBC AUTO: 86.5 FL (ref 81–99)
MONOCYTES ABSOLUTE: 0.9 K/UL (ref 0–0.9)
MONOCYTES RELATIVE PERCENT: 7.3 % (ref 0–10)
NEUTROPHILS ABSOLUTE: 9 K/UL (ref 1.5–7.5)
NEUTROPHILS RELATIVE PERCENT: 76.5 % (ref 50–65)
PDW BLD-RTO: 13.4 % (ref 11.5–14.5)
PLATELET # BLD: 183 K/UL (ref 130–400)
PMV BLD AUTO: 10 FL (ref 9.4–12.3)
POTASSIUM SERPL-SCNC: 4 MMOL/L (ref 3.5–5)
PROTEIN PROTEIN: 14 MG/DL (ref 15–45)
RBC # BLD: 3.99 M/UL (ref 4.2–5.4)
SODIUM BLD-SCNC: 137 MMOL/L (ref 136–145)
TOTAL PROTEIN: 6.6 G/DL (ref 6.6–8.7)
WBC # BLD: 11.7 K/UL (ref 4.8–10.8)

## 2018-07-27 PROCEDURE — 0502F SUBSEQUENT PRENATAL CARE: CPT | Performed by: OBSTETRICS & GYNECOLOGY

## 2018-07-27 PROCEDURE — G0378 HOSPITAL OBSERVATION PER HR: HCPCS

## 2018-07-27 PROCEDURE — 36415 COLL VENOUS BLD VENIPUNCTURE: CPT

## 2018-07-27 PROCEDURE — G0379 DIRECT REFER HOSPITAL OBSERV: HCPCS

## 2018-07-27 PROCEDURE — 84156 ASSAY OF PROTEIN URINE: CPT

## 2018-07-27 PROCEDURE — 59025 FETAL NON-STRESS TEST: CPT

## 2018-07-27 PROCEDURE — 80053 COMPREHEN METABOLIC PANEL: CPT

## 2018-07-27 PROCEDURE — 82570 ASSAY OF URINE CREATININE: CPT

## 2018-07-27 PROCEDURE — 85025 COMPLETE CBC W/AUTO DIFF WBC: CPT

## 2018-07-27 NOTE — PROGRESS NOTES
Patient presents today for a routine prenatal visit. Pt denies any vaginal leaking bleeding or contractions. + Fetal movement. Pt sent to L&D for further testing for HTN. All questions answered.

## 2018-07-27 NOTE — PATIENT INSTRUCTIONS
you.  · Try to stay calm when your baby cries. Your baby may get more upset if he or she senses that you are upset. Know how to care for your   · Your baby's umbilical cord stump will drop off on its own, usually between 1 and 2 weeks. To care for your baby's umbilical cord area:  ¨ Clean the area at the bottom of the cord 2 or 3 times a day. ¨ Pay special attention to the area where the cord attaches to the skin. ¨ Keep the diaper folded below the cord. ¨ Use a damp washcloth or cotton ball to sponge bathe your baby until the stump has come off. · Your baby's first dark stool is called meconium. After the meconium is passed, your baby will develop his or her own bowel pattern. ¨ Some babies, especially  babies, have several bowel movements a day. Others have one or two a day, or one every 2 to 3 days. ¨  babies often have loose, yellow stools. Formula-fed babies have more formed stools. ¨ If your baby's stools look like little pellets, he or she is constipated. After 2 days of constipation, call your baby's doctor. · If your baby will be circumcised, you can care for him at home. ¨ Gently rinse his penis with warm water after every diaper change. Do not try to remove the film that forms on the penis. This film will go away on its own. Pat dry. ¨ Put petroleum ointment, such as Vaseline, on the area of the diaper that will touch your baby's penis. This will keep the diaper from sticking to your baby. ¨ Ask the doctor about giving your baby acetaminophen (Tylenol) for pain. Where can you learn more? Go to https://chvernoneb.healthParadise [facility] [employer]houses. org and sign in to your [employer] account. Enter 68  97 in the [employer] box to learn more about \"Week 37 of Your Pregnancy: Care Instructions. \"     If you do not have an account, please click on the \"Sign Up Now\" link. Current as of: 2017  Content Version: 11.6  © 8775-8116 [employer], [facility].  Care

## 2018-07-27 NOTE — PROGRESS NOTES
Pt here from Dr. Rivas Chamberlain office with high blood pressure. Orders rec'd for labwork. Will call in results to Dr. Rivas Chamberlain. EFM and TOCO applied. Will continue to monitor.

## 2018-07-28 ENCOUNTER — HOSPITAL ENCOUNTER (OUTPATIENT)
Age: 28
Discharge: HOME HEALTH | End: 2018-07-28
Attending: OBSTETRICS & GYNECOLOGY | Admitting: OBSTETRICS & GYNECOLOGY
Payer: COMMERCIAL

## 2018-07-28 VITALS
DIASTOLIC BLOOD PRESSURE: 68 MMHG | SYSTOLIC BLOOD PRESSURE: 115 MMHG | HEART RATE: 90 BPM | TEMPERATURE: 98 F | RESPIRATION RATE: 20 BRPM

## 2018-07-28 LAB
ALBUMIN SERPL-MCNC: 3.2 G/DL (ref 3.5–5.2)
ALP BLD-CCNC: 104 U/L (ref 35–104)
ALT SERPL-CCNC: 8 U/L (ref 5–33)
AMORPHOUS: ABNORMAL /HPF
ANION GAP SERPL CALCULATED.3IONS-SCNC: 14 MMOL/L (ref 7–19)
AST SERPL-CCNC: 15 U/L (ref 5–32)
BACTERIA: ABNORMAL /HPF
BASOPHILS ABSOLUTE: 0 K/UL (ref 0–0.2)
BASOPHILS RELATIVE PERCENT: 0.2 % (ref 0–1)
BILIRUB SERPL-MCNC: <0.2 MG/DL (ref 0.2–1.2)
BILIRUBIN URINE: NEGATIVE
BLOOD, URINE: ABNORMAL
BUN BLDV-MCNC: 11 MG/DL (ref 6–20)
CALCIUM SERPL-MCNC: 9.7 MG/DL (ref 8.6–10)
CHLORIDE BLD-SCNC: 102 MMOL/L (ref 98–111)
CLARITY: ABNORMAL
CO2: 21 MMOL/L (ref 22–29)
COLOR: YELLOW
CREAT SERPL-MCNC: 0.7 MG/DL (ref 0.5–0.9)
CREATININE URINE: 49.4 MG/DL (ref 4.2–622)
EOSINOPHILS ABSOLUTE: 0.1 K/UL (ref 0–0.6)
EOSINOPHILS RELATIVE PERCENT: 0.5 % (ref 0–5)
EPITHELIAL CELLS, UA: ABNORMAL /HPF
GFR NON-AFRICAN AMERICAN: >60
GLUCOSE BLD-MCNC: 76 MG/DL (ref 74–109)
GLUCOSE URINE: NEGATIVE MG/DL
HCT VFR BLD CALC: 33.8 % (ref 37–47)
HEMOGLOBIN: 11.2 G/DL (ref 12–16)
KETONES, URINE: NEGATIVE MG/DL
LEUKOCYTE ESTERASE, URINE: ABNORMAL
LYMPHOCYTES ABSOLUTE: 1.7 K/UL (ref 1.1–4.5)
LYMPHOCYTES RELATIVE PERCENT: 16 % (ref 20–40)
MCH RBC QN AUTO: 28.7 PG (ref 27–31)
MCHC RBC AUTO-ENTMCNC: 33.1 G/DL (ref 33–37)
MCV RBC AUTO: 86.7 FL (ref 81–99)
MONOCYTES ABSOLUTE: 0.8 K/UL (ref 0–0.9)
MONOCYTES RELATIVE PERCENT: 7.7 % (ref 0–10)
NEUTROPHILS ABSOLUTE: 8.2 K/UL (ref 1.5–7.5)
NEUTROPHILS RELATIVE PERCENT: 75.1 % (ref 50–65)
NITRITE, URINE: NEGATIVE
PDW BLD-RTO: 13.2 % (ref 11.5–14.5)
PH UA: 7
PLATELET # BLD: 159 K/UL (ref 130–400)
PMV BLD AUTO: 10.1 FL (ref 9.4–12.3)
POTASSIUM SERPL-SCNC: 3.9 MMOL/L (ref 3.5–5)
PROTEIN PROTEIN: 9 MG/DL (ref 15–45)
PROTEIN UA: NEGATIVE MG/DL
RBC # BLD: 3.9 M/UL (ref 4.2–5.4)
RBC UA: ABNORMAL /HPF (ref 0–2)
RENAL EPITHELIAL, UA: ABNORMAL /HPF
SODIUM BLD-SCNC: 137 MMOL/L (ref 136–145)
SPECIFIC GRAVITY UA: 1.01
TOTAL PROTEIN: 6.2 G/DL (ref 6.6–8.7)
URIC ACID, SERUM: 4.6 MG/DL (ref 2.4–5.7)
UROBILINOGEN, URINE: 0.2 E.U./DL
WBC # BLD: 10.8 K/UL (ref 4.8–10.8)
WBC UA: ABNORMAL /HPF (ref 0–5)

## 2018-07-28 PROCEDURE — 84550 ASSAY OF BLOOD/URIC ACID: CPT

## 2018-07-28 PROCEDURE — 80053 COMPREHEN METABOLIC PANEL: CPT

## 2018-07-28 PROCEDURE — 81001 URINALYSIS AUTO W/SCOPE: CPT

## 2018-07-28 PROCEDURE — G0378 HOSPITAL OBSERVATION PER HR: HCPCS

## 2018-07-28 PROCEDURE — G0379 DIRECT REFER HOSPITAL OBSERV: HCPCS

## 2018-07-28 PROCEDURE — 85025 COMPLETE CBC W/AUTO DIFF WBC: CPT

## 2018-07-28 PROCEDURE — 82570 ASSAY OF URINE CREATININE: CPT

## 2018-07-28 PROCEDURE — 36415 COLL VENOUS BLD VENIPUNCTURE: CPT

## 2018-07-28 PROCEDURE — 84156 ASSAY OF PROTEIN URINE: CPT

## 2018-07-28 RX ORDER — SODIUM CHLORIDE 0.9 % (FLUSH) 0.9 %
10 SYRINGE (ML) INJECTION PRN
Status: DISCONTINUED | OUTPATIENT
Start: 2018-07-28 | End: 2018-07-29 | Stop reason: HOSPADM

## 2018-07-28 RX ORDER — ONDANSETRON 2 MG/ML
4 INJECTION INTRAMUSCULAR; INTRAVENOUS EVERY 6 HOURS PRN
Status: DISCONTINUED | OUTPATIENT
Start: 2018-07-28 | End: 2018-07-29 | Stop reason: HOSPADM

## 2018-07-28 RX ORDER — SODIUM CHLORIDE 0.9 % (FLUSH) 0.9 %
10 SYRINGE (ML) INJECTION EVERY 12 HOURS SCHEDULED
Status: DISCONTINUED | OUTPATIENT
Start: 2018-07-28 | End: 2018-07-29 | Stop reason: HOSPADM

## 2018-07-28 RX ORDER — ACETAMINOPHEN 325 MG/1
650 TABLET ORAL EVERY 4 HOURS PRN
Status: DISCONTINUED | OUTPATIENT
Start: 2018-07-28 | End: 2018-07-29 | Stop reason: HOSPADM

## 2018-08-02 ENCOUNTER — HOSPITAL ENCOUNTER (OUTPATIENT)
Age: 28
Setting detail: OBSERVATION
Discharge: HOME | End: 2018-08-02
Attending: OBSTETRICS & GYNECOLOGY | Admitting: OBSTETRICS & GYNECOLOGY
Payer: COMMERCIAL

## 2018-08-02 ENCOUNTER — ROUTINE PRENATAL (OUTPATIENT)
Dept: OBGYN | Age: 28
End: 2018-08-02

## 2018-08-02 VITALS
SYSTOLIC BLOOD PRESSURE: 147 MMHG | HEART RATE: 115 BPM | BODY MASS INDEX: 37.2 KG/M2 | WEIGHT: 210 LBS | DIASTOLIC BLOOD PRESSURE: 96 MMHG

## 2018-08-02 VITALS — HEART RATE: 85 BPM | DIASTOLIC BLOOD PRESSURE: 81 MMHG | SYSTOLIC BLOOD PRESSURE: 122 MMHG | TEMPERATURE: 98.4 F

## 2018-08-02 DIAGNOSIS — Z34.03 ENCOUNTER FOR SUPERVISION OF NORMAL FIRST PREGNANCY IN THIRD TRIMESTER: Primary | ICD-10-CM

## 2018-08-02 DIAGNOSIS — O16.3 ELEVATED BLOOD PRESSURE AFFECTING PREGNANCY IN THIRD TRIMESTER, ANTEPARTUM: ICD-10-CM

## 2018-08-02 PROBLEM — O13.3 PIH (PREGNANCY INDUCED HYPERTENSION), THIRD TRIMESTER: Status: ACTIVE | Noted: 2018-08-02

## 2018-08-02 LAB
ALBUMIN SERPL-MCNC: 3.5 G/DL (ref 3.5–5.2)
ALP BLD-CCNC: 123 U/L (ref 35–104)
ALT SERPL-CCNC: 11 U/L (ref 5–33)
ANION GAP SERPL CALCULATED.3IONS-SCNC: 15 MMOL/L (ref 7–19)
AST SERPL-CCNC: 16 U/L (ref 5–32)
BACTERIA: ABNORMAL /HPF
BILIRUB SERPL-MCNC: <0.2 MG/DL (ref 0.2–1.2)
BILIRUBIN URINE: ABNORMAL
BILIRUBIN URINE: NEGATIVE
BLOOD, URINE: ABNORMAL
BLOOD, URINE: NEGATIVE
BUN BLDV-MCNC: 11 MG/DL (ref 6–20)
CALCIUM SERPL-MCNC: 9.7 MG/DL (ref 8.6–10)
CHLORIDE BLD-SCNC: 104 MMOL/L (ref 98–111)
CLARITY: ABNORMAL
CLARITY: CLEAR
CO2: 19 MMOL/L (ref 22–29)
COLOR: ABNORMAL
COLOR: YELLOW
CREAT SERPL-MCNC: 0.6 MG/DL (ref 0.5–0.9)
CREATININE URINE: 81.5 MG/DL (ref 4.2–622)
EPITHELIAL CELLS, UA: ABNORMAL /HPF
GFR NON-AFRICAN AMERICAN: >60
GLUCOSE BLD-MCNC: 76 MG/DL (ref 74–109)
GLUCOSE URINE: NEGATIVE MG/DL
GLUCOSE URINE: NEGATIVE MG/DL
HCT VFR BLD CALC: 35.2 % (ref 37–47)
HEMOGLOBIN: 11.8 G/DL (ref 12–16)
KETONES, URINE: NEGATIVE MG/DL
KETONES, URINE: NEGATIVE MG/DL
LEUKOCYTE ESTERASE, URINE: ABNORMAL
LEUKOCYTE ESTERASE, URINE: NEGATIVE
MCH RBC QN AUTO: 28.9 PG (ref 27–31)
MCHC RBC AUTO-ENTMCNC: 33.5 G/DL (ref 33–37)
MCV RBC AUTO: 86.3 FL (ref 81–99)
NITRITE, URINE: NEGATIVE
NITRITE, URINE: NEGATIVE
PDW BLD-RTO: 13.3 % (ref 11.5–14.5)
PH UA: 7
PH UA: 7.5
PLATELET # BLD: 181 K/UL (ref 130–400)
PMV BLD AUTO: 10.3 FL (ref 9.4–12.3)
POTASSIUM SERPL-SCNC: 4 MMOL/L (ref 3.5–5)
PROTEIN PROTEIN: 66 MG/DL (ref 15–45)
PROTEIN UA: >=300 MG/DL
PROTEIN UA: NEGATIVE MG/DL
RBC # BLD: 4.08 M/UL (ref 4.2–5.4)
RBC UA: ABNORMAL /HPF (ref 0–2)
SODIUM BLD-SCNC: 138 MMOL/L (ref 136–145)
SPECIFIC GRAVITY UA: 1.02
SPECIFIC GRAVITY UA: 1.02
TOTAL PROTEIN: 6.9 G/DL (ref 6.6–8.7)
UROBILINOGEN, URINE: 0.2 E.U./DL
UROBILINOGEN, URINE: 0.2 E.U./DL
WBC # BLD: 11.6 K/UL (ref 4.8–10.8)
WBC UA: ABNORMAL /HPF (ref 0–5)

## 2018-08-02 PROCEDURE — 84156 ASSAY OF PROTEIN URINE: CPT

## 2018-08-02 PROCEDURE — 81003 URINALYSIS AUTO W/O SCOPE: CPT

## 2018-08-02 PROCEDURE — 82570 ASSAY OF URINE CREATININE: CPT

## 2018-08-02 PROCEDURE — 36415 COLL VENOUS BLD VENIPUNCTURE: CPT

## 2018-08-02 PROCEDURE — 0502F SUBSEQUENT PRENATAL CARE: CPT | Performed by: OBSTETRICS & GYNECOLOGY

## 2018-08-02 PROCEDURE — 80053 COMPREHEN METABOLIC PANEL: CPT

## 2018-08-02 PROCEDURE — 81001 URINALYSIS AUTO W/SCOPE: CPT

## 2018-08-02 PROCEDURE — G0379 DIRECT REFER HOSPITAL OBSERV: HCPCS

## 2018-08-02 PROCEDURE — G0378 HOSPITAL OBSERVATION PER HR: HCPCS

## 2018-08-02 PROCEDURE — 59025 FETAL NON-STRESS TEST: CPT

## 2018-08-02 PROCEDURE — 85027 COMPLETE CBC AUTOMATED: CPT

## 2018-08-02 NOTE — FLOWSHEET NOTE
Awaiting lab results. Talked with patient at length regarding PIH s/s and labor precautions. She verbalized understanding. She continues to deny any headache, blurry vision, or epigastric pain. Will continue to monitor.

## 2018-08-02 NOTE — PROGRESS NOTES
Pt presents today for routine prenatal care. Pt denies any vaginal leaking bleeding or contractions. + Fetal movement. Pt sent to L&D for further testing due to HTN.

## 2018-08-05 ENCOUNTER — HOSPITAL ENCOUNTER (OUTPATIENT)
Age: 28
Setting detail: OBSERVATION
Discharge: HOME HEALTH | End: 2018-08-05
Attending: OBSTETRICS & GYNECOLOGY | Admitting: OBSTETRICS & GYNECOLOGY
Payer: COMMERCIAL

## 2018-08-05 VITALS
TEMPERATURE: 98.3 F | HEART RATE: 97 BPM | SYSTOLIC BLOOD PRESSURE: 122 MMHG | DIASTOLIC BLOOD PRESSURE: 85 MMHG | RESPIRATION RATE: 17 BRPM

## 2018-08-05 PROBLEM — Z36.89 NST (NON-STRESS TEST) REACTIVE: Status: ACTIVE | Noted: 2018-08-05

## 2018-08-05 PROCEDURE — G0379 DIRECT REFER HOSPITAL OBSERV: HCPCS

## 2018-08-05 PROCEDURE — G0378 HOSPITAL OBSERVATION PER HR: HCPCS

## 2018-08-05 PROCEDURE — 59025 FETAL NON-STRESS TEST: CPT

## 2018-08-06 ENCOUNTER — HOSPITAL ENCOUNTER (INPATIENT)
Age: 28
LOS: 3 days | Discharge: HOME | End: 2018-08-09
Attending: OBSTETRICS & GYNECOLOGY | Admitting: OBSTETRICS & GYNECOLOGY
Payer: COMMERCIAL

## 2018-08-06 ENCOUNTER — APPOINTMENT (OUTPATIENT)
Dept: LABOR AND DELIVERY | Age: 28
End: 2018-08-06
Payer: COMMERCIAL

## 2018-08-06 PROBLEM — Z34.90 TERM PREGNANCY: Status: ACTIVE | Noted: 2018-08-06

## 2018-08-06 LAB
ABO/RH: NORMAL
ANTIBODY SCREEN: NORMAL
HCT VFR BLD CALC: 34.1 % (ref 37–47)
HEMOGLOBIN: 11.3 G/DL (ref 12–16)
MCH RBC QN AUTO: 28.8 PG (ref 27–31)
MCHC RBC AUTO-ENTMCNC: 33.1 G/DL (ref 33–37)
MCV RBC AUTO: 87 FL (ref 81–99)
PDW BLD-RTO: 13.4 % (ref 11.5–14.5)
PLATELET # BLD: 188 K/UL (ref 130–400)
PMV BLD AUTO: 10.1 FL (ref 9.4–12.3)
RBC # BLD: 3.92 M/UL (ref 4.2–5.4)
WBC # BLD: 11 K/UL (ref 4.8–10.8)

## 2018-08-06 PROCEDURE — 86901 BLOOD TYPING SEROLOGIC RH(D): CPT

## 2018-08-06 PROCEDURE — 86592 SYPHILIS TEST NON-TREP QUAL: CPT

## 2018-08-06 PROCEDURE — 6360000002 HC RX W HCPCS: Performed by: OBSTETRICS & GYNECOLOGY

## 2018-08-06 PROCEDURE — 1220000000 HC SEMI PRIVATE OB R&B

## 2018-08-06 PROCEDURE — 3E033VJ INTRODUCTION OF OTHER HORMONE INTO PERIPHERAL VEIN, PERCUTANEOUS APPROACH: ICD-10-PCS | Performed by: OBSTETRICS & GYNECOLOGY

## 2018-08-06 PROCEDURE — 85027 COMPLETE CBC AUTOMATED: CPT

## 2018-08-06 PROCEDURE — 36415 COLL VENOUS BLD VENIPUNCTURE: CPT

## 2018-08-06 PROCEDURE — 86850 RBC ANTIBODY SCREEN: CPT

## 2018-08-06 PROCEDURE — 2580000003 HC RX 258: Performed by: OBSTETRICS & GYNECOLOGY

## 2018-08-06 PROCEDURE — 59025 FETAL NON-STRESS TEST: CPT

## 2018-08-06 PROCEDURE — 4A1HXCZ MONITORING OF PRODUCTS OF CONCEPTION, CARDIAC RATE, EXTERNAL APPROACH: ICD-10-PCS | Performed by: OBSTETRICS & GYNECOLOGY

## 2018-08-06 PROCEDURE — 86900 BLOOD TYPING SEROLOGIC ABO: CPT

## 2018-08-06 RX ORDER — BUTORPHANOL TARTRATE 1 MG/ML
1 INJECTION, SOLUTION INTRAMUSCULAR; INTRAVENOUS
Status: DISCONTINUED | OUTPATIENT
Start: 2018-08-06 | End: 2018-08-10 | Stop reason: HOSPADM

## 2018-08-06 RX ORDER — SODIUM CHLORIDE, SODIUM LACTATE, POTASSIUM CHLORIDE, CALCIUM CHLORIDE 600; 310; 30; 20 MG/100ML; MG/100ML; MG/100ML; MG/100ML
INJECTION, SOLUTION INTRAVENOUS CONTINUOUS
Status: DISCONTINUED | OUTPATIENT
Start: 2018-08-06 | End: 2018-08-10 | Stop reason: HOSPADM

## 2018-08-06 RX ORDER — BISACODYL 10 MG
10 SUPPOSITORY, RECTAL RECTAL DAILY PRN
Status: DISCONTINUED | OUTPATIENT
Start: 2018-08-06 | End: 2018-08-10 | Stop reason: HOSPADM

## 2018-08-06 RX ORDER — DOCUSATE SODIUM 100 MG/1
100 CAPSULE, LIQUID FILLED ORAL 2 TIMES DAILY
Status: DISCONTINUED | OUTPATIENT
Start: 2018-08-06 | End: 2018-08-07

## 2018-08-06 RX ORDER — LIDOCAINE HYDROCHLORIDE 10 MG/ML
30 INJECTION, SOLUTION EPIDURAL; INFILTRATION; INTRACAUDAL; PERINEURAL PRN
Status: DISCONTINUED | OUTPATIENT
Start: 2018-08-06 | End: 2018-08-10 | Stop reason: HOSPADM

## 2018-08-06 RX ORDER — SODIUM CHLORIDE 0.9 % (FLUSH) 0.9 %
10 SYRINGE (ML) INJECTION EVERY 12 HOURS SCHEDULED
Status: DISCONTINUED | OUTPATIENT
Start: 2018-08-06 | End: 2018-08-10 | Stop reason: HOSPADM

## 2018-08-06 RX ORDER — VANCOMYCIN HYDROCHLORIDE 1 G/200ML
1000 INJECTION, SOLUTION INTRAVENOUS EVERY 12 HOURS
Status: DISCONTINUED | OUTPATIENT
Start: 2018-08-07 | End: 2018-08-08

## 2018-08-06 RX ORDER — VANCOMYCIN HYDROCHLORIDE 1 G/200ML
1000 INJECTION, SOLUTION INTRAVENOUS EVERY 12 HOURS
Status: DISCONTINUED | OUTPATIENT
Start: 2018-08-07 | End: 2018-08-06 | Stop reason: DRUGHIGH

## 2018-08-06 RX ORDER — ONDANSETRON 2 MG/ML
8 INJECTION INTRAMUSCULAR; INTRAVENOUS EVERY 8 HOURS PRN
Status: DISCONTINUED | OUTPATIENT
Start: 2018-08-06 | End: 2018-08-10 | Stop reason: HOSPADM

## 2018-08-06 RX ORDER — SODIUM CHLORIDE 0.9 % (FLUSH) 0.9 %
10 SYRINGE (ML) INJECTION PRN
Status: DISCONTINUED | OUTPATIENT
Start: 2018-08-06 | End: 2018-08-10 | Stop reason: HOSPADM

## 2018-08-06 RX ADMIN — SODIUM CHLORIDE, POTASSIUM CHLORIDE, SODIUM LACTATE AND CALCIUM CHLORIDE: 600; 310; 30; 20 INJECTION, SOLUTION INTRAVENOUS at 21:00

## 2018-08-06 RX ADMIN — VANCOMYCIN HYDROCHLORIDE 2000 MG: 1 INJECTION, POWDER, LYOPHILIZED, FOR SOLUTION INTRAVENOUS at 22:03

## 2018-08-06 RX ADMIN — Medication 1 MILLI-UNITS/MIN: at 22:10

## 2018-08-07 ENCOUNTER — ANESTHESIA EVENT (OUTPATIENT)
Dept: MOTHER INFANT UNIT | Age: 28
End: 2018-08-07
Payer: COMMERCIAL

## 2018-08-07 ENCOUNTER — ANESTHESIA (OUTPATIENT)
Dept: MOTHER INFANT UNIT | Age: 28
End: 2018-08-07
Payer: COMMERCIAL

## 2018-08-07 LAB
AMPHETAMINE SCREEN, URINE: NEGATIVE
BARBITURATE SCREEN URINE: NEGATIVE
BASE EXCESS ARTERIAL: -15.9 MMOL/L (ref -2–2)
BENZODIAZEPINE SCREEN, URINE: NEGATIVE
CANNABINOID SCREEN URINE: NEGATIVE
CARBOXYHEMOGLOBIN ARTERIAL: 0.4 % (ref 0–5)
COCAINE METABOLITE SCREEN URINE: NEGATIVE
HCO3 ARTERIAL: 19.1 MMOL/L (ref 22–26)
HEMOGLOBIN, ART, EXTENDED: 17.4 G/DL (ref 12–16)
Lab: NORMAL
METHEMOGLOBIN ARTERIAL: 1.4 %
O2 CONTENT ARTERIAL: 4 ML/DL
O2 SAT, ARTERIAL: 16.4 %
O2 THERAPY: ABNORMAL
OPIATE SCREEN URINE: NEGATIVE
PCO2 ARTERIAL: 91 MMHG (ref 35–45)
PH ARTERIAL: 6.93 (ref 7.35–7.45)
PO2 ARTERIAL: 11 MMHG (ref 80–100)
POTASSIUM, WHOLE BLOOD: 5.9

## 2018-08-07 PROCEDURE — 59025 FETAL NON-STRESS TEST: CPT

## 2018-08-07 PROCEDURE — 3E0S3NZ INTRODUCTION OF ANALGESICS, HYPNOTICS, SEDATIVES INTO EPIDURAL SPACE, PERCUTANEOUS APPROACH: ICD-10-PCS | Performed by: OBSTETRICS & GYNECOLOGY

## 2018-08-07 PROCEDURE — 6370000000 HC RX 637 (ALT 250 FOR IP): Performed by: OBSTETRICS & GYNECOLOGY

## 2018-08-07 PROCEDURE — 10907ZC DRAINAGE OF AMNIOTIC FLUID, THERAPEUTIC FROM PRODUCTS OF CONCEPTION, VIA NATURAL OR ARTIFICIAL OPENING: ICD-10-PCS | Performed by: OBSTETRICS & GYNECOLOGY

## 2018-08-07 PROCEDURE — 7200000001 HC VAGINAL DELIVERY

## 2018-08-07 PROCEDURE — 84132 ASSAY OF SERUM POTASSIUM: CPT

## 2018-08-07 PROCEDURE — 1220000000 HC SEMI PRIVATE OB R&B

## 2018-08-07 PROCEDURE — 6360000002 HC RX W HCPCS

## 2018-08-07 PROCEDURE — 82803 BLOOD GASES ANY COMBINATION: CPT

## 2018-08-07 PROCEDURE — 6360000002 HC RX W HCPCS: Performed by: NURSE ANESTHETIST, CERTIFIED REGISTERED

## 2018-08-07 PROCEDURE — 80307 DRUG TEST PRSMV CHEM ANLYZR: CPT

## 2018-08-07 PROCEDURE — 2580000003 HC RX 258: Performed by: OBSTETRICS & GYNECOLOGY

## 2018-08-07 PROCEDURE — 6360000002 HC RX W HCPCS: Performed by: OBSTETRICS & GYNECOLOGY

## 2018-08-07 PROCEDURE — 59400 OBSTETRICAL CARE: CPT | Performed by: OBSTETRICS & GYNECOLOGY

## 2018-08-07 PROCEDURE — 3700000025 ANESTHESIA EPIDURAL BLOCK: Performed by: NURSE ANESTHETIST, CERTIFIED REGISTERED

## 2018-08-07 PROCEDURE — 2500000003 HC RX 250 WO HCPCS: Performed by: NURSE ANESTHETIST, CERTIFIED REGISTERED

## 2018-08-07 RX ORDER — LIDOCAINE HYDROCHLORIDE 10 MG/ML
INJECTION, SOLUTION EPIDURAL; INFILTRATION; INTRACAUDAL; PERINEURAL PRN
Status: DISCONTINUED | OUTPATIENT
Start: 2018-08-07 | End: 2018-08-07 | Stop reason: SDUPTHER

## 2018-08-07 RX ORDER — CARBOPROST TROMETHAMINE 250 UG/ML
250 INJECTION, SOLUTION INTRAMUSCULAR
Status: ACTIVE | OUTPATIENT
Start: 2018-08-07 | End: 2018-08-07

## 2018-08-07 RX ORDER — METHYLERGONOVINE MALEATE 0.2 MG/ML
200 INJECTION INTRAVENOUS
Status: ACTIVE | OUTPATIENT
Start: 2018-08-07 | End: 2018-08-07

## 2018-08-07 RX ORDER — OXYCODONE HYDROCHLORIDE AND ACETAMINOPHEN 5; 325 MG/1; MG/1
2 TABLET ORAL EVERY 4 HOURS PRN
Status: DISCONTINUED | OUTPATIENT
Start: 2018-08-07 | End: 2018-08-10 | Stop reason: HOSPADM

## 2018-08-07 RX ORDER — IBUPROFEN 400 MG/1
800 TABLET ORAL EVERY 8 HOURS
Status: DISCONTINUED | OUTPATIENT
Start: 2018-08-07 | End: 2018-08-10 | Stop reason: HOSPADM

## 2018-08-07 RX ORDER — FERROUS SULFATE 325(65) MG
325 TABLET ORAL 2 TIMES DAILY WITH MEALS
Status: DISCONTINUED | OUTPATIENT
Start: 2018-08-07 | End: 2018-08-10 | Stop reason: HOSPADM

## 2018-08-07 RX ORDER — LANOLIN 100 %
OINTMENT (GRAM) TOPICAL PRN
Status: DISCONTINUED | OUTPATIENT
Start: 2018-08-07 | End: 2018-08-10 | Stop reason: HOSPADM

## 2018-08-07 RX ORDER — DOCUSATE SODIUM 100 MG/1
100 CAPSULE, LIQUID FILLED ORAL 2 TIMES DAILY
Status: DISCONTINUED | OUTPATIENT
Start: 2018-08-07 | End: 2018-08-10 | Stop reason: HOSPADM

## 2018-08-07 RX ORDER — ROPIVACAINE HYDROCHLORIDE 2 MG/ML
INJECTION, SOLUTION EPIDURAL; INFILTRATION; PERINEURAL CONTINUOUS PRN
Status: DISCONTINUED | OUTPATIENT
Start: 2018-08-07 | End: 2018-08-07 | Stop reason: SDUPTHER

## 2018-08-07 RX ORDER — OXYCODONE HYDROCHLORIDE AND ACETAMINOPHEN 5; 325 MG/1; MG/1
1 TABLET ORAL EVERY 4 HOURS PRN
Status: DISCONTINUED | OUTPATIENT
Start: 2018-08-07 | End: 2018-08-10 | Stop reason: HOSPADM

## 2018-08-07 RX ORDER — FENTANYL CITRATE 50 UG/ML
INJECTION, SOLUTION INTRAMUSCULAR; INTRAVENOUS PRN
Status: DISCONTINUED | OUTPATIENT
Start: 2018-08-07 | End: 2018-08-07 | Stop reason: SDUPTHER

## 2018-08-07 RX ORDER — SODIUM CHLORIDE, SODIUM LACTATE, POTASSIUM CHLORIDE, AND CALCIUM CHLORIDE .6; .31; .03; .02 G/100ML; G/100ML; G/100ML; G/100ML
1000 INJECTION, SOLUTION INTRAVENOUS ONCE
Status: COMPLETED | OUTPATIENT
Start: 2018-08-07 | End: 2018-08-07

## 2018-08-07 RX ORDER — LIDOCAINE HYDROCHLORIDE 20 MG/ML
INJECTION, SOLUTION EPIDURAL; INFILTRATION; INTRACAUDAL; PERINEURAL PRN
Status: DISCONTINUED | OUTPATIENT
Start: 2018-08-07 | End: 2018-08-07 | Stop reason: SDUPTHER

## 2018-08-07 RX ORDER — ROPIVACAINE HYDROCHLORIDE 2 MG/ML
INJECTION, SOLUTION EPIDURAL; INFILTRATION; PERINEURAL PRN
Status: DISCONTINUED | OUTPATIENT
Start: 2018-08-07 | End: 2018-08-07 | Stop reason: SDUPTHER

## 2018-08-07 RX ORDER — MISOPROSTOL 200 UG/1
800 TABLET ORAL PRN
Status: DISCONTINUED | OUTPATIENT
Start: 2018-08-07 | End: 2018-08-10 | Stop reason: HOSPADM

## 2018-08-07 RX ADMIN — LIDOCAINE HYDROCHLORIDE 5 ML: 10 INJECTION, SOLUTION EPIDURAL; INFILTRATION; INTRACAUDAL; PERINEURAL at 15:27

## 2018-08-07 RX ADMIN — DOCUSATE SODIUM 100 MG: 100 CAPSULE, LIQUID FILLED ORAL at 19:36

## 2018-08-07 RX ADMIN — SODIUM CHLORIDE, POTASSIUM CHLORIDE, SODIUM LACTATE AND CALCIUM CHLORIDE: 600; 310; 30; 20 INJECTION, SOLUTION INTRAVENOUS at 03:26

## 2018-08-07 RX ADMIN — ROPIVACAINE HYDROCHLORIDE 8 ML: 2 INJECTION, SOLUTION EPIDURAL; INFILTRATION at 09:42

## 2018-08-07 RX ADMIN — SODIUM CHLORIDE, POTASSIUM CHLORIDE, SODIUM LACTATE AND CALCIUM CHLORIDE: 600; 310; 30; 20 INJECTION, SOLUTION INTRAVENOUS at 13:32

## 2018-08-07 RX ADMIN — LIDOCAINE HYDROCHLORIDE 6 ML: 20 INJECTION, SOLUTION EPIDURAL; INFILTRATION; INTRACAUDAL; PERINEURAL at 15:40

## 2018-08-07 RX ADMIN — SODIUM CHLORIDE, POTASSIUM CHLORIDE, SODIUM LACTATE AND CALCIUM CHLORIDE 1000 ML: 600; 310; 30; 20 INJECTION, SOLUTION INTRAVENOUS at 09:19

## 2018-08-07 RX ADMIN — OXYCODONE HYDROCHLORIDE AND ACETAMINOPHEN 1 TABLET: 5; 325 TABLET ORAL at 21:21

## 2018-08-07 RX ADMIN — VANCOMYCIN HYDROCHLORIDE 1000 MG: 1 INJECTION, SOLUTION INTRAVENOUS at 09:27

## 2018-08-07 RX ADMIN — OXYCODONE HYDROCHLORIDE AND ACETAMINOPHEN 1 TABLET: 5; 325 TABLET ORAL at 19:36

## 2018-08-07 RX ADMIN — ROPIVACAINE HYDROCHLORIDE 5 ML: 2 INJECTION, SOLUTION EPIDURAL; INFILTRATION at 15:27

## 2018-08-07 RX ADMIN — ROPIVACAINE HYDROCHLORIDE 10 ML/HR: 2 INJECTION, SOLUTION EPIDURAL; INFILTRATION; PERINEURAL at 09:44

## 2018-08-07 RX ADMIN — FENTANYL CITRATE 100 MCG: 50 INJECTION INTRAMUSCULAR; INTRAVENOUS at 09:42

## 2018-08-07 RX ADMIN — IBUPROFEN 800 MG: 400 TABLET ORAL at 19:36

## 2018-08-07 ASSESSMENT — PAIN SCALES - GENERAL
PAINLEVEL_OUTOF10: 4
PAINLEVEL_OUTOF10: 3

## 2018-08-07 NOTE — ANESTHESIA PROCEDURE NOTES
Epidural Block    Patient location during procedure: OB  Start time: 8/7/2018 9:30 AM  End time: 8/7/2018 9:38 AM  Reason for block: labor epidural  Staffing  Resident/CRNA: Tatiana Coffman  Performed: resident/CRNA   Preanesthetic Checklist  Completed: patient identified, site marked, surgical consent, pre-op evaluation, timeout performed, IV checked, risks and benefits discussed, monitors and equipment checked, anesthesia consent given, oxygen available and patient being monitored  Epidural  Patient position: sitting  Prep: Betadine  Patient monitoring: continuous pulse ox and frequent blood pressure checks  Approach: midline  Location: lumbar (1-5)  Injection technique: BRUNO air  Provider prep: mask and sterile gloves  Needle  Needle type: Tuohy   Needle gauge: 17 G  Needle length: 3.5 in  Needle insertion depth: 6 cm  Catheter type: end hole  Catheter size: 19 G  Catheter at skin depth: 12 cm  Test dose: negative  Assessment  Sensory level: T10  Hemodynamics: stable  Attempts: 1

## 2018-08-07 NOTE — ANESTHESIA PRE PROCEDURE
Department of Anesthesiology  Preprocedure Note       Name:  Floyd Ro   Age:  32 y.o.  :  1990                                          MRN:  [id number]         Date:  2018      Surgeon: * No surgeons listed *    Procedure: * No procedures listed *    Medications prior to admission:   Prior to Admission medications    Medication Sig Start Date End Date Taking?  Authorizing Provider   metFORMIN (GLUCOPHAGE) 1000 MG tablet Take 1 tablet by mouth 2 times daily (with meals) 17  Yes Merle Allison MD   Prenatal MV-Min-Fe Fum-FA-DHA (PRENATAL 1 PO) Take by mouth   Yes Historical Provider, MD       Current medications:    Current Facility-Administered Medications   Medication Dose Route Frequency Provider Last Rate Last Dose    lactated ringers bolus  1,000 mL Intravenous Once Merle Allison MD 1,000 mL/hr at 18 0919 1,000 mL at 18 0919    lactated ringers infusion   Intravenous Continuous Merle Allison  mL/hr at 18 0326      sodium chloride flush 0.9 % injection 10 mL  10 mL Intravenous 2 times per day Merle Allison MD        sodium chloride flush 0.9 % injection 10 mL  10 mL Intravenous PRN Merle Allison MD        lidocaine PF 1 % injection 30 mL  30 mL Other PRN Merle Allison MD        butorphanol (STADOL) injection 1 mg  1 mg Intravenous Q1H PRN Merle Allison MD        oxytocin (PITOCIN) 30 units in 500 mL infusion  1 leola-units/min Intravenous Continuous Merle Allison MD 18 mL/hr at 18 0739 18 leola-units/min at 18 0739    docusate sodium (COLACE) capsule 100 mg  100 mg Oral BID Merle Allison MD        bisacodyl (DULCOLAX) suppository 10 mg  10 mg Rectal Daily PRN Merle Allison MD        ondansetron [facility]) injection 8 mg  8 mg Intravenous Q8H PRN Merle Allison MD        vancomycin [facility]) intermittent dosing (placeholder)   Other RX Placeholder Merle Allison MD        vancomycin (VANCOCIN) 1000 mg in dextrose BUN 11 08/02/2018    CREATININE 0.6 08/02/2018    LABGLOM >60 08/02/2018    GLUCOSE 76 08/02/2018    PROT 6.9 08/02/2018    PROT 7.7 01/31/2013    CALCIUM 9.7 08/02/2018    BILITOT <0.2 08/02/2018    ALKPHOS 123 08/02/2018    AST 16 08/02/2018    ALT 11 08/02/2018       POC Tests: No results for input(s): POCGLU, POCNA, POCK, POCCL, POCBUN, POCHEMO, POCHCT in the last 72 hours. Coags: No results found for: PROTIME, INR, APTT    HCG (If Applicable):   Lab Results   Component Value Date    PREGTESTUR pos 12/08/2017        ABGs: No results found for: PHART, PO2ART, STS1OUJ, NNC4ZJH, BEART, I3JFKJZJ     Type & Screen (If Applicable):  No results found for: LABABO, [address]    Anesthesia Evaluation  Patient summary reviewed and Nursing notes reviewed no history of anesthetic complications:   Airway: Mallampati: II  TM distance: >3 FB   Neck ROM: full  Mouth opening: > = 3 FB Dental: normal exam         Pulmonary:normal exam    (+) asthma:                            Cardiovascular:  Exercise tolerance: good (>4 METS),   (+) hypertension:,                   Neuro/Psych:   (+) psychiatric history:            GI/Hepatic/Renal:   (+) GERD:,           Endo/Other: Negative Endo/Other ROS                    Abdominal:   (+) obese,         Vascular:                                        Anesthesia Plan      epidural     ASA 3             Anesthetic plan and risks discussed with patient.                       Toshia Sanchez, JOE - CRNA   8/7/2018

## 2018-08-07 NOTE — H&P
OBSTETRICAL HISTORY AND PHYSICAL    Provider:  Elizabeth Mention   Date: 2018  Time: 5:18 PM    Patient Name: Dulce Mehta  Patient : 1990  Room/Bed: 0219/0219-01    Primary Care Physician: Ej Landis MD      Chief Complaint:  elevated blood pressure    HPI: Dulce Mehta is a 32 y.o. Sofia Corrente at 39w0d weeks who presents for IOL. Contractions: Yes  Rupture Of Membranes: No  Bleeding: No    Pregnancy Risk factors:  Patient Active Problem List   Diagnosis    Hypertension    PIH (pregnancy induced hypertension), third trimester    NST (non-stress test) reactive    Term pregnancy       Allergies: Allergies as of 2018 - Review Complete 2018   Allergen Reaction Noted    Keflex [cephalexin]  07/15/2015    Penicillins Anaphylaxis and Hives 2018       Medications:  No current facility-administered medications on file prior to encounter.       Current Outpatient Prescriptions on File Prior to Encounter   Medication Sig Dispense Refill    metFORMIN (GLUCOPHAGE) 1000 MG tablet Take 1 tablet by mouth 2 times daily (with meals) 60 tablet 11    Prenatal MV-Min-Fe Fum-FA-DHA (PRENATAL 1 PO) Take by mouth             Past Medical History:   Diagnosis Date    Acne     ADHD     Asthma     Breast disorder     Augmentation    Hypertension 2018    Hypoglycemia     Mental disorder     Anxiety and Depression    PCOS (polycystic ovarian syndrome)        Past Surgical History:   Procedure Laterality Date    BREAST ENHANCEMENT SURGERY      WISDOM TOOTH EXTRACTION         Obstetric History       T0      L0     SAB0   TAB0   Ectopic0   Molar0   Multiple0   Live Births0       # Outcome Date GA Lbr Nima/2nd Weight Sex Delivery Anes PTL Lv   1 Current                   Family History   Problem Relation Age of Onset    Breast Cancer Maternal Grandmother 48       Social History     Social History    Marital status:      Spouse name: N/A    Number of children: for delivery  Pain management  GBS Prophylaxis  Risks, benefits, alternatives and possible complications have been discussed in detail with the patient. Admission, and post admission procedures and expectations were discussed in detail. All questions were answered.       Merle Allison MD

## 2018-08-07 NOTE — FLOWSHEET NOTE
08/07/18 1405   Cervical Exam   Dilation (cm) 10   Dilation Complete Date 08/07/18   Dilation Complete Time 1400   Effacement (%) 100   Cervical Characteristics Anterior   Station 0   Station (Labor Curve Graph) 5   Presentation Vertex   pushing with little progress

## 2018-08-08 LAB
HCT VFR BLD CALC: 30.7 % (ref 37–47)
HEMOGLOBIN: 10.2 G/DL (ref 12–16)
MCH RBC QN AUTO: 29.4 PG (ref 27–31)
MCHC RBC AUTO-ENTMCNC: 33.2 G/DL (ref 33–37)
MCV RBC AUTO: 88.5 FL (ref 81–99)
PDW BLD-RTO: 13.7 % (ref 11.5–14.5)
PLATELET # BLD: 152 K/UL (ref 130–400)
PMV BLD AUTO: 9.9 FL (ref 9.4–12.3)
RBC # BLD: 3.47 M/UL (ref 4.2–5.4)
WBC # BLD: 14.2 K/UL (ref 4.8–10.8)

## 2018-08-08 PROCEDURE — 36415 COLL VENOUS BLD VENIPUNCTURE: CPT

## 2018-08-08 PROCEDURE — 1220000000 HC SEMI PRIVATE OB R&B

## 2018-08-08 PROCEDURE — 85027 COMPLETE CBC AUTOMATED: CPT

## 2018-08-08 PROCEDURE — 6370000000 HC RX 637 (ALT 250 FOR IP): Performed by: OBSTETRICS & GYNECOLOGY

## 2018-08-08 RX ADMIN — FERROUS SULFATE TAB 325 MG (65 MG ELEMENTAL FE) 325 MG: 325 (65 FE) TAB at 07:58

## 2018-08-08 RX ADMIN — DOCUSATE SODIUM 100 MG: 100 CAPSULE, LIQUID FILLED ORAL at 07:58

## 2018-08-08 RX ADMIN — IBUPROFEN 800 MG: 400 TABLET ORAL at 07:58

## 2018-08-08 RX ADMIN — DOCUSATE SODIUM 100 MG: 100 CAPSULE, LIQUID FILLED ORAL at 22:21

## 2018-08-08 RX ADMIN — BENZOCAINE AND LEVOMENTHOL: 200; 5 SPRAY TOPICAL at 22:21

## 2018-08-08 ASSESSMENT — PAIN SCALES - GENERAL: PAINLEVEL_OUTOF10: 3

## 2018-08-08 NOTE — FLOWSHEET NOTE
Pt transferred to postpartum room 213 via w/c per OB staff. Pt alert and oriented x 3. Belongings to room per S.O. Pt in nsy at this time visiting infant. Pt to call out when she is ready to go back to the room. Will continue to monitor for further pt care.

## 2018-08-09 VITALS
TEMPERATURE: 97.5 F | HEART RATE: 68 BPM | WEIGHT: 210 LBS | RESPIRATION RATE: 16 BRPM | DIASTOLIC BLOOD PRESSURE: 92 MMHG | BODY MASS INDEX: 37.21 KG/M2 | SYSTOLIC BLOOD PRESSURE: 146 MMHG | OXYGEN SATURATION: 99 % | HEIGHT: 63 IN

## 2018-08-09 LAB — RPR: NORMAL

## 2018-08-09 PROCEDURE — 99999 PR OFFICE/OUTPT VISIT,PROCEDURE ONLY: CPT | Performed by: OBSTETRICS & GYNECOLOGY

## 2018-08-09 PROCEDURE — 6370000000 HC RX 637 (ALT 250 FOR IP): Performed by: OBSTETRICS & GYNECOLOGY

## 2018-08-09 RX ORDER — IBUPROFEN 800 MG/1
800 TABLET ORAL EVERY 8 HOURS PRN
Qty: 90 TABLET | Refills: 0 | Status: SHIPPED | OUTPATIENT
Start: 2018-08-09 | End: 2018-09-20

## 2018-08-09 RX ADMIN — FERROUS SULFATE TAB 325 MG (65 MG ELEMENTAL FE) 325 MG: 325 (65 FE) TAB at 19:55

## 2018-08-09 RX ADMIN — IBUPROFEN 800 MG: 400 TABLET ORAL at 07:43

## 2018-08-09 RX ADMIN — DOCUSATE SODIUM 100 MG: 100 CAPSULE, LIQUID FILLED ORAL at 07:46

## 2018-08-09 RX ADMIN — FERROUS SULFATE TAB 325 MG (65 MG ELEMENTAL FE) 325 MG: 325 (65 FE) TAB at 07:46

## 2018-08-09 ASSESSMENT — PAIN SCALES - GENERAL: PAINLEVEL_OUTOF10: 4

## 2018-08-10 NOTE — FLOWSHEET NOTE
Assessment completed. Sitting up in bed holding infant. Denies pain or needs. Family supportive at bedside.

## 2018-08-28 ENCOUNTER — POSTPARTUM VISIT (OUTPATIENT)
Dept: OBGYN | Age: 28
End: 2018-08-28

## 2018-08-28 VITALS
BODY MASS INDEX: 35.03 KG/M2 | HEART RATE: 97 BPM | SYSTOLIC BLOOD PRESSURE: 122 MMHG | HEIGHT: 63 IN | WEIGHT: 197.7 LBS | DIASTOLIC BLOOD PRESSURE: 86 MMHG

## 2018-08-28 PROCEDURE — 0503F POSTPARTUM CARE VISIT: CPT | Performed by: NURSE PRACTITIONER

## 2018-08-28 ASSESSMENT — ENCOUNTER SYMPTOMS
CONSTIPATION: 0
GASTROINTESTINAL NEGATIVE: 1
RESPIRATORY NEGATIVE: 1
ALLERGIC/IMMUNOLOGIC NEGATIVE: 1
EYES NEGATIVE: 1
DIARRHEA: 0

## 2018-08-28 NOTE — PROGRESS NOTES
Subjective:      Patient ID: Edgar Fernandez is a 32 y.o. female. HPI     Visit Reason:  Post-Partum Visit       [de-identified] Name:  Tammy Velazquez       Delivery Date:  08/07/2018       Type of Delivery:  Vaginal        Feeding: breast and supplement        LMP:  11/7/2017       Contraceptive Choices:  Pt is ready to discuss her options       Last PAP:  11/7/2017       Depression:  none    Review of Systems   Constitutional: Negative. HENT: Negative. Eyes: Negative. Respiratory: Negative. Cardiovascular: Negative. Gastrointestinal: Negative. Negative for constipation and diarrhea. Endocrine: Negative. Genitourinary: Positive for vaginal bleeding (normal lochia). Negative for frequency, menstrual problem and urgency. Musculoskeletal: Negative. Skin: Negative. Allergic/Immunologic: Negative. Neurological: Negative. Hematological: Negative. Psychiatric/Behavioral: Negative. All other systems reviewed and are negative. Objective:   Physical Exam   Constitutional: She is oriented to person, place, and time. She appears well-developed and well-nourished. Musculoskeletal: Normal range of motion. Neurological: She is alert and oriented to person, place, and time. Skin: Skin is warm and dry. Psychiatric: She has a normal mood and affect. Nursing note and vitals reviewed. Assessment:      postpartum      Plan:      Refilled Metformin per pt request. Discussed Prog only birth control options- interested in Nexplanon or Micronor. Will decide at next visit. Wanting to return to work at 8 weeks, will need a note.           JOE Selby - CNP

## 2018-08-28 NOTE — Clinical Note
Pt wanting a note sent to her work to have 8 weeks off, states Dr Gabby Nguyen said we could get her a note.  Thanks ML

## 2018-08-28 NOTE — PATIENT INSTRUCTIONS
Patient Education        After Your Delivery (the Postpartum Period): Care Instructions  Your Care Instructions    Congratulations on the birth of your baby. Like pregnancy, the  period can be a time of excitement, ada, and exhaustion. You may look at your wondrous little baby and feel happy. You may also be overwhelmed by your new sleep hours and new responsibilities. At first, babies often sleep during the days and are awake at night. They do not have a pattern or routine. They may make sudden gasps, jerk themselves awake, or look like they have crossed eyes. These are all normal, and they may even make you smile. In these first weeks after delivery, try to take good care of yourself. It may take 4 to 6 weeks to feel like yourself again, and possibly longer if you had a  birth. You will likely feel very tired for several weeks. Your days will be full of ups and downs, but lots of ada as well. Follow-up care is a key part of your treatment and safety. Be sure to make and go to all appointments, and call your doctor if you are having problems. It's also a good idea to know your test results and keep a list of the medicines you take. How can you care for yourself at home? Take care of your body after delivery  · Use pads instead of tampons for the bloody flow that may last as long as 2 weeks. · Ease cramps with ibuprofen (Advil, Motrin). · Ease soreness of hemorrhoids and the area between your vagina and rectum with ice compresses or witch hazel pads. · Ease constipation by drinking lots of fluid and eating high-fiber foods. Ask your doctor about over-the-counter stool softeners. · Cleanse yourself with a gentle squeeze of warm water from a bottle instead of wiping with toilet paper. · Take a sitz bath in warm water several times a day. · Wear a good nursing bra. Ease sore and swollen breasts with warm, wet washcloths.   · If you are not breastfeeding, use ice rather than heat for breast soreness. · Your period may not start for several months if you are breastfeeding. You may bleed more, and longer at first, than you did before you got pregnant. · Wait until you are healed (about 4 to 6 weeks) before you have sexual intercourse. Your doctor will tell you when it is okay to have sex. · Try not to travel with your baby for 5 or 6 weeks. If you take a long car trip, make frequent stops to walk around and stretch. Avoid exhaustion  · Rest every day. Try to nap when your baby naps. · Ask another adult to be with you for a few days after delivery. · Plan for  if you have other children. · Stay flexible so you can eat at odd hours and sleep when you need to. Both you and your baby are making new schedules. · Plan small trips to get out of the house. Change can make you feel less tired. · Ask for help with housework, cooking, and shopping. Remind yourself that your job is to care for your baby. Know about help for postpartum depression  · \"Baby blues\" are common for the first 1 to 2 weeks after birth. You may cry or feel sad or irritable for no reason. · Rest whenever you can. Being tired makes it harder to handle your emotions. · Go for walks with your baby. · Talk to your partner, friends, and family about your feelings. · If your symptoms last for more than a few weeks, or if you feel very depressed, ask your doctor for help. · Postpartum depression can be treated. Support groups and counseling can help. Sometimes medicine can also help. Stay healthy  · Eat healthy foods so you have more energy, make good breast milk, and lose extra baby pounds. · If you breastfeed, avoid alcohol and drugs. If you quit smoking during pregnancy, try to stay smoke-free. · Start daily exercise after 4 to 6 weeks, but rest when you feel tired. · Learn exercises to tone your belly. Do Kegel exercises to regain strength in your pelvic muscles.  You can do these exercises while you stand or

## 2018-08-28 NOTE — PROGRESS NOTES
The patient returns for her post-partum visit. All information below was reviewed with her.     Visit Reason:  Post-Partum Visit       Baby's Name:  Marguerite Luo       Delivery Date:  08/07/2018       Type of Delivery:  Vaginal        Feeding: breast and supplement        LMP:  11/7/2017       Contraceptive Choices:  Pt is ready to discuss her options       Last PAP:  11/7/2017       Depression:  none    Problems:     Pt needs a letter for when she can return to work (10/1/2018)  Needs a refill on metFORMIN

## 2018-09-20 ENCOUNTER — POSTPARTUM VISIT (OUTPATIENT)
Dept: OBGYN | Age: 28
End: 2018-09-20

## 2018-09-20 VITALS
HEIGHT: 63 IN | HEART RATE: 97 BPM | TEMPERATURE: 98.7 F | BODY MASS INDEX: 34.91 KG/M2 | SYSTOLIC BLOOD PRESSURE: 138 MMHG | DIASTOLIC BLOOD PRESSURE: 80 MMHG | WEIGHT: 197 LBS

## 2018-09-20 PROCEDURE — 0503F POSTPARTUM CARE VISIT: CPT | Performed by: NURSE PRACTITIONER

## 2018-09-20 RX ORDER — METOCLOPRAMIDE 10 MG/1
TABLET ORAL
Qty: 75 TABLET | Refills: 0 | Status: SHIPPED | OUTPATIENT
Start: 2018-09-20 | End: 2018-11-05 | Stop reason: ALTCHOICE

## 2018-09-20 RX ORDER — ACETAMINOPHEN AND CODEINE PHOSPHATE 120; 12 MG/5ML; MG/5ML
1 SOLUTION ORAL DAILY
Qty: 28 TABLET | Refills: 11 | Status: SHIPPED | OUTPATIENT
Start: 2018-09-20 | End: 2019-03-04

## 2018-09-20 ASSESSMENT — ENCOUNTER SYMPTOMS
EYES NEGATIVE: 1
GASTROINTESTINAL NEGATIVE: 1
RESPIRATORY NEGATIVE: 1
ALLERGIC/IMMUNOLOGIC NEGATIVE: 1
DIARRHEA: 0
CONSTIPATION: 0

## 2018-09-20 NOTE — PROGRESS NOTES
Subjective:      Patient ID: Garrison Sullivan is a 32 y.o. female. HPI     Visit Reason:  Post-Partum Visit       Baby's Name: Gianluca Dominguez Date:  08/07/2018       Type of Delivery:  Vaginal        Feeding: breast and supplement        LMP:  11/7/2017       Contraceptive Choices:  Pt is ready to discuss her options       Last PAP:  11/7/2017       Depression:  none  Problems: none, just tired    Review of Systems   Constitutional: Positive for fatigue. HENT: Negative. Eyes: Negative. Respiratory: Negative. Cardiovascular: Negative. Gastrointestinal: Negative. Negative for constipation and diarrhea. Endocrine: Negative. Genitourinary: Negative. Negative for frequency, menstrual problem and urgency. Musculoskeletal: Negative. Skin: Negative. Allergic/Immunologic: Negative. Neurological: Negative. Hematological: Negative. Psychiatric/Behavioral: Negative. All other systems reviewed and are negative. Objective:   Physical Exam   Constitutional: She is oriented to person, place, and time. She appears well-developed and well-nourished. Musculoskeletal: Normal range of motion. Neurological: She is alert and oriented to person, place, and time. Skin: Skin is warm and dry. Psychiatric: She has a normal mood and affect. Nursing note and vitals reviewed. Assessment:      Postpartum      Plan:      Denies any problems with cramping or bleeding. Doing well with nursing but feels like supply is going down. Going to try Fenugreek and will also start Reglan. Had abnormal pap, ASCUS, +HPV, will return in 2-4 weeks for repeat pap. Requesting to start Micronor for birth control. Discussed birth control options, start Sunday after next period. Patient advised to take same time daily, may have breakthrough bleeding for the first 1-3 months. Advised to use back up contraception for the first month and with use of antibiotics.  Birth control is not effective against STD's. Risk vs. Benefits discussed. We discussed if SOB, chest pain, dizziness, weakness to stop [facility] and call for appointment or procede to ER for evaluation. Patient voiced understanding.           Priscilla Horan, JOE - CNP

## 2018-11-05 ENCOUNTER — HOSPITAL ENCOUNTER (OUTPATIENT)
Dept: WOMENS IMAGING | Age: 28
Discharge: HOME | End: 2018-11-05
Payer: COMMERCIAL

## 2018-11-05 ENCOUNTER — OFFICE VISIT (OUTPATIENT)
Dept: OBGYN | Age: 28
End: 2018-11-05
Payer: COMMERCIAL

## 2018-11-05 VITALS
HEART RATE: 86 BPM | WEIGHT: 199 LBS | BODY MASS INDEX: 35.26 KG/M2 | SYSTOLIC BLOOD PRESSURE: 146 MMHG | DIASTOLIC BLOOD PRESSURE: 91 MMHG | HEIGHT: 63 IN

## 2018-11-05 DIAGNOSIS — N63.25 BREAST LUMP ON LEFT SIDE AT 3 O'CLOCK POSITION: ICD-10-CM

## 2018-11-05 DIAGNOSIS — I10 HYPERTENSION, UNSPECIFIED TYPE: ICD-10-CM

## 2018-11-05 DIAGNOSIS — J06.9 UPPER RESPIRATORY TRACT INFECTION, UNSPECIFIED TYPE: ICD-10-CM

## 2018-11-05 DIAGNOSIS — Z01.411 ENCOUNTER FOR GYNECOLOGICAL EXAMINATION WITH ABNORMAL FINDING: Primary | ICD-10-CM

## 2018-11-05 DIAGNOSIS — Z12.4 SCREENING FOR MALIGNANT NEOPLASM OF CERVIX: ICD-10-CM

## 2018-11-05 PROCEDURE — 99395 PREV VISIT EST AGE 18-39: CPT | Performed by: NURSE PRACTITIONER

## 2018-11-05 PROCEDURE — 76642 ULTRASOUND BREAST LIMITED: CPT

## 2018-11-05 RX ORDER — AZITHROMYCIN 250 MG/1
TABLET, FILM COATED ORAL
Qty: 1 PACKET | Refills: 0 | Status: SHIPPED | OUTPATIENT
Start: 2018-11-05 | End: 2018-11-09

## 2018-11-05 RX ORDER — METOCLOPRAMIDE 10 MG/1
TABLET ORAL
Qty: 75 TABLET | Refills: 0 | Status: SHIPPED | OUTPATIENT
Start: 2018-11-05 | End: 2019-01-10

## 2018-11-05 RX ORDER — SPIRONOLACTONE 50 MG/1
50 TABLET, FILM COATED ORAL DAILY
Qty: 30 TABLET | Refills: 3 | Status: SHIPPED | OUTPATIENT
Start: 2018-11-05 | End: 2019-03-28 | Stop reason: SDUPTHER

## 2018-11-11 ASSESSMENT — ENCOUNTER SYMPTOMS
COUGH: 1
EYES NEGATIVE: 1
CONSTIPATION: 0
ALLERGIC/IMMUNOLOGIC NEGATIVE: 1
DIARRHEA: 0
ROS SKIN COMMENTS: LEFT BREAST LUMP
GASTROINTESTINAL NEGATIVE: 1

## 2018-11-11 NOTE — PROGRESS NOTES
mammograms improve your chances of finding any problems with your breasts. Some women set a time each month to do a step-by-step breast self-exam. Other women like a less formal system. They might look at their breasts as they brush their teeth, or feel their breasts once in a while in the shower. If you notice a change in your breast, tell your doctor. Follow-up care is a key part of your treatment and safety. Be sure to make and go to all appointments, and call your doctor if you are having problems. It's also a good idea to know your test results and keep a list of the medicines you take. How do you do a breast self-exam?  · The best time to examine your breasts is usually one week after your menstrual period begins. Your breasts should not be tender then. If you do not have periods, you might do your exam on a day of the month that is easy to remember. · To examine your breasts:  ¨ Remove all your clothes above the waist and lie down. When you are lying down, your breast tissue spreads evenly over your chest wall, which makes it easier to feel all your breast tissue. ¨ Use the pads-not the fingertips-of the 3 middle fingers of your left hand to check your right breast. Move your fingers slowly in small coin-sized circles that overlap. ¨ Use three levels of pressure to feel of all your breast tissue. Use light pressure to feel the tissue close to the skin surface. Use medium pressure to feel a little deeper. Use firm pressure to feel your tissue close to your breastbone and ribs. Use each pressure level to feel your breast tissue before moving on to the next spot. ¨ Check your entire breast, moving up and down as if following a strip from the collarbone to the bra line, and from the armpit to the ribs. Repeat until you have covered the entire breast.  ¨ Repeat this procedure for your left breast, using the pads of the 3 middle fingers of your right hand.   · To examine your breasts while in the

## 2018-11-12 LAB
HPV TYPE 16: NOT DETECTED
HPV TYPE 18: NOT DETECTED
INTERPRETATION: NORMAL
OTHER HIGH RISK HPV: NOT DETECTED
SOURCE: NORMAL

## 2018-11-21 ENCOUNTER — TELEPHONE (OUTPATIENT)
Dept: OBGYN | Age: 28
End: 2018-11-21

## 2018-11-21 NOTE — TELEPHONE ENCOUNTER
Pateint called and stated that she was told she needed to follow up with OBGYN in 6-8 weeks and have another ultrasound on her breast. Patient stated that she has not received a call about another appt or ultrasound and would like to know how soon she can be scheduled.   Thank you

## 2019-01-10 ENCOUNTER — OFFICE VISIT (OUTPATIENT)
Dept: OBGYN | Age: 29
End: 2019-01-10
Payer: COMMERCIAL

## 2019-01-10 ENCOUNTER — HOSPITAL ENCOUNTER (OUTPATIENT)
Dept: WOMENS IMAGING | Age: 29
Discharge: HOME | End: 2019-01-10
Payer: COMMERCIAL

## 2019-01-10 VITALS
HEART RATE: 99 BPM | DIASTOLIC BLOOD PRESSURE: 85 MMHG | BODY MASS INDEX: 35.61 KG/M2 | HEIGHT: 63 IN | SYSTOLIC BLOOD PRESSURE: 135 MMHG | WEIGHT: 201 LBS

## 2019-01-10 DIAGNOSIS — N63.25 BREAST LUMP ON LEFT SIDE AT 3 O'CLOCK POSITION: ICD-10-CM

## 2019-01-10 DIAGNOSIS — N63.0 BREAST MASS IN FEMALE: Primary | ICD-10-CM

## 2019-01-10 PROCEDURE — 99213 OFFICE O/P EST LOW 20 MIN: CPT | Performed by: NURSE PRACTITIONER

## 2019-01-10 PROCEDURE — 76642 ULTRASOUND BREAST LIMITED: CPT

## 2019-01-10 ASSESSMENT — ENCOUNTER SYMPTOMS
GASTROINTESTINAL NEGATIVE: 1
DIARRHEA: 0
ALLERGIC/IMMUNOLOGIC NEGATIVE: 1
RESPIRATORY NEGATIVE: 1
EYES NEGATIVE: 1
CONSTIPATION: 0

## 2019-02-14 ENCOUNTER — OFFICE VISIT (OUTPATIENT)
Dept: SURGERY | Age: 29
End: 2019-02-14
Payer: COMMERCIAL

## 2019-02-14 VITALS
HEIGHT: 63 IN | SYSTOLIC BLOOD PRESSURE: 120 MMHG | BODY MASS INDEX: 35.97 KG/M2 | WEIGHT: 203 LBS | HEART RATE: 88 BPM | DIASTOLIC BLOOD PRESSURE: 90 MMHG

## 2019-02-14 DIAGNOSIS — N63.0 LUMP OR MASS IN BREAST: ICD-10-CM

## 2019-02-14 DIAGNOSIS — N60.19 FIBROCYSTIC BREAST DISEASE (FCBD), UNSPECIFIED LATERALITY: ICD-10-CM

## 2019-02-14 PROCEDURE — 99203 OFFICE O/P NEW LOW 30 MIN: CPT | Performed by: SURGERY

## 2019-02-19 ENCOUNTER — PATIENT MESSAGE (OUTPATIENT)
Dept: OBGYN | Age: 29
End: 2019-02-19

## 2019-02-19 RX ORDER — DROSPIRENONE AND ETHINYL ESTRADIOL 0.03MG-3MG
1 KIT ORAL DAILY
Qty: 28 TABLET | Refills: 10 | Status: SHIPPED | OUTPATIENT
Start: 2019-02-19 | End: 2019-03-04

## 2019-03-04 ENCOUNTER — HOSPITAL ENCOUNTER (OUTPATIENT)
Dept: WOMENS IMAGING | Age: 29
Discharge: HOME | End: 2019-03-04
Payer: COMMERCIAL

## 2019-03-04 ENCOUNTER — PATIENT MESSAGE (OUTPATIENT)
Dept: OBGYN | Age: 29
End: 2019-03-04

## 2019-03-04 DIAGNOSIS — N63.25 BREAST LUMP ON LEFT SIDE AT 3 O'CLOCK POSITION: ICD-10-CM

## 2019-03-04 PROCEDURE — 19083 BX BREAST 1ST LESION US IMAG: CPT | Performed by: SURGERY

## 2019-03-04 PROCEDURE — 88305 TISSUE EXAM BY PATHOLOGIST: CPT

## 2019-03-04 PROCEDURE — 77065 DX MAMMO INCL CAD UNI: CPT

## 2019-03-04 PROCEDURE — 2709999900 US BREAST BIOPSY W LOC DEVICE 1ST LESION LEFT

## 2019-03-04 RX ORDER — DROSPIRENONE AND ETHINYL ESTRADIOL 0.03MG-3MG
1 KIT ORAL DAILY
Qty: 1 PACKET | Refills: 8 | Status: SHIPPED | OUTPATIENT
Start: 2019-03-04 | End: 2019-07-12 | Stop reason: SINTOL

## 2019-03-05 ENCOUNTER — TELEPHONE (OUTPATIENT)
Dept: SURGERY | Age: 29
End: 2019-03-05

## 2019-03-07 RX ORDER — ACETAMINOPHEN AND CODEINE PHOSPHATE 120; 12 MG/5ML; MG/5ML
1 SOLUTION ORAL DAILY
Qty: 28 TABLET | Refills: 6 | Status: SHIPPED | OUTPATIENT
Start: 2019-03-07 | End: 2019-07-12 | Stop reason: ALTCHOICE

## 2019-03-18 ENCOUNTER — OFFICE VISIT (OUTPATIENT)
Dept: SURGERY | Age: 29
End: 2019-03-18
Payer: COMMERCIAL

## 2019-03-18 VITALS — WEIGHT: 209 LBS | HEIGHT: 63 IN | BODY MASS INDEX: 37.03 KG/M2 | TEMPERATURE: 98.6 F

## 2019-03-18 DIAGNOSIS — N60.12 BREAST CHANGES, FIBROCYSTIC, LEFT: Primary | ICD-10-CM

## 2019-03-18 PROCEDURE — 99212 OFFICE O/P EST SF 10 MIN: CPT | Performed by: PHYSICIAN ASSISTANT

## 2019-03-28 RX ORDER — SPIRONOLACTONE 50 MG/1
TABLET, FILM COATED ORAL
Qty: 30 TABLET | Refills: 3 | Status: SHIPPED | OUTPATIENT
Start: 2019-03-28 | End: 2019-08-09 | Stop reason: SDUPTHER

## 2019-07-12 RX ORDER — LEVONORGESTREL AND ETHINYL ESTRADIOL 0.1-0.02MG
1 KIT ORAL DAILY
Qty: 1 PACKET | Refills: 6 | Status: SHIPPED | OUTPATIENT
Start: 2019-07-12 | End: 2020-04-07

## 2019-07-22 ENCOUNTER — HOSPITAL ENCOUNTER (OUTPATIENT)
Dept: WOMENS IMAGING | Age: 29
Discharge: HOME | End: 2019-07-22
Payer: COMMERCIAL

## 2019-07-22 ENCOUNTER — OFFICE VISIT (OUTPATIENT)
Dept: SURGERY | Age: 29
End: 2019-07-22
Payer: COMMERCIAL

## 2019-07-22 VITALS — TEMPERATURE: 98.3 F | BODY MASS INDEX: 37.03 KG/M2 | HEIGHT: 63 IN | WEIGHT: 209 LBS

## 2019-07-22 DIAGNOSIS — N60.12 BREAST CHANGES, FIBROCYSTIC, LEFT: ICD-10-CM

## 2019-07-22 DIAGNOSIS — N60.12 BREAST CHANGES, FIBROCYSTIC, LEFT: Primary | ICD-10-CM

## 2019-07-22 PROCEDURE — 99213 OFFICE O/P EST LOW 20 MIN: CPT | Performed by: PHYSICIAN ASSISTANT

## 2019-07-22 PROCEDURE — 76642 ULTRASOUND BREAST LIMITED: CPT

## 2019-08-09 RX ORDER — SPIRONOLACTONE 50 MG/1
TABLET, FILM COATED ORAL
Qty: 30 TABLET | Refills: 3 | Status: SHIPPED | OUTPATIENT
Start: 2019-08-09 | End: 2019-10-29 | Stop reason: SDUPTHER

## 2019-11-21 RX ORDER — SPIRONOLACTONE 50 MG/1
TABLET, FILM COATED ORAL
Qty: 30 TABLET | Refills: 0 | Status: SHIPPED | OUTPATIENT
Start: 2019-11-21 | End: 2020-01-09

## 2020-01-09 RX ORDER — SPIRONOLACTONE 50 MG/1
TABLET, FILM COATED ORAL
Qty: 30 TABLET | Refills: 0 | Status: SHIPPED | OUTPATIENT
Start: 2020-01-09 | End: 2020-09-17 | Stop reason: SDUPTHER

## 2020-02-07 ENCOUNTER — OFFICE VISIT (OUTPATIENT)
Dept: OBGYN | Age: 30
End: 2020-02-07
Payer: COMMERCIAL

## 2020-02-07 VITALS
HEART RATE: 107 BPM | WEIGHT: 217 LBS | BODY MASS INDEX: 38.45 KG/M2 | DIASTOLIC BLOOD PRESSURE: 93 MMHG | SYSTOLIC BLOOD PRESSURE: 141 MMHG | HEIGHT: 63 IN

## 2020-02-07 PROCEDURE — 99213 OFFICE O/P EST LOW 20 MIN: CPT | Performed by: NURSE PRACTITIONER

## 2020-02-07 PROCEDURE — 99395 PREV VISIT EST AGE 18-39: CPT | Performed by: NURSE PRACTITIONER

## 2020-02-07 ASSESSMENT — ENCOUNTER SYMPTOMS
ROS SKIN COMMENTS: ACNE
GASTROINTESTINAL NEGATIVE: 1
EYES NEGATIVE: 1
RESPIRATORY NEGATIVE: 1
DIARRHEA: 0
CONSTIPATION: 0
ALLERGIC/IMMUNOLOGIC NEGATIVE: 1

## 2020-02-07 NOTE — PROGRESS NOTES
Anette Alcantara is a 34 y.o. female who presents today for her medical conditions/ complaints as noted below. Anette Alcantara is c/o of Gynecologic Exam        HPI   Pt presents for annual exam and pap smear. Recently restarted Metformin and Spironolactone after taking several months off. Periods have been regular, but struggling with PCOS issues. Having some weight gain, acne. Just got a [place] bike and has been biking in the morning. Interested in weight loss methods and counseling. Going on several vacations this summer and wanting to lose weight. Mammo:2019  Pap smear:2018  Contraception: OCP    P:1  Ab:0  Bone density:NA  Colonoscopy:NA  Patient's last menstrual period was 2020 (approximate).   [id number]    Past Medical History:   Diagnosis Date    Acne     ADHD     Asthma     Breast disorder     Augmentation    Hypertension 2018    Hypoglycemia     Mental disorder     Anxiety and Depression    PCOS (polycystic ovarian syndrome)      Past Surgical History:   Procedure Laterality Date    BREAST BIOPSY Left     BREAST ENHANCEMENT SURGERY      WISDOM TOOTH EXTRACTION       Family History   Problem Relation Age of Onset    Breast Cancer Maternal Grandmother 36        [place] El     Cancer Paternal Grandmother         Female-spread to other organs     Brain Cancer Other         Maternal Great Uncle      Social History     Tobacco Use    Smoking status: Never Smoker    Smokeless tobacco: Never Used   Substance Use Topics    Alcohol use: No     Alcohol/week: 0.0 standard drinks       Current Outpatient Medications   Medication Sig Dispense Refill    Nutritional Supplements (VITAMIN D BOOSTER PO) Take by mouth      spironolactone (ALDACTONE) 50 MG tablet TAKE 1 TABLET BY MOUTH EVERY DAY 30 tablet 0    metFORMIN (GLUCOPHAGE) 1000 MG tablet TAKE 1 TABLET BY MOUTH TWICE A DAY WITH MEALS 60 tablet 11    levonorgestrel-ethinyl estradiol (SRONYX) 0.1-20 MG-MCG per tablet Take 1 tablet by mouth daily (Patient not taking: Reported on 2/7/2020) 1 packet 6    Prenatal MV-Min-Fe Fum-FA-DHA (PRENATAL 1 PO) Take by mouth       No current facility-administered medications for this visit. Allergies   Allergen Reactions    Keflex [Cephalexin]     Penicillins Anaphylaxis and Hives     Vitals:    02/07/20 1513   BP: (!) 141/93   Pulse: 107     Body mass index is 38.44 kg/m². Review of Systems   Constitutional: Positive for unexpected weight change (wt gain). HENT: Negative. Eyes: Negative. Respiratory: Negative. Cardiovascular: Negative. Gastrointestinal: Negative. Negative for constipation and diarrhea. Endocrine: Negative. Genitourinary: Negative. Negative for frequency, menstrual problem and urgency. Musculoskeletal: Negative. Skin: Negative. acne   Allergic/Immunologic: Negative. Neurological: Negative. Hematological: Negative. Psychiatric/Behavioral: Negative. All other systems reviewed and are negative. Physical Exam  Vitals signs and nursing note reviewed. Constitutional:       Appearance: She is well-developed. HENT:      Head: Normocephalic. Neck:      Musculoskeletal: Normal range of motion and neck supple. Thyroid: No thyroid mass or thyromegaly. Cardiovascular:      Rate and Rhythm: Normal rate and regular rhythm. Pulmonary:      Effort: Pulmonary effort is normal.      Breath sounds: Normal breath sounds. Chest:      Breasts:         Right: No inverted nipple, mass, nipple discharge or skin change. Left: No inverted nipple, mass, nipple discharge or skin change. Comments: Bilateral implants intact  Abdominal:      Palpations: Abdomen is soft. There is no mass. Tenderness: There is no abdominal tenderness. Genitourinary:     General: Normal vulva. Vagina: Normal.      Cervix: No cervical motion tenderness. Uterus: Normal. Not enlarged.        Adnexa:         Right: No mass or tenderness. Left: No mass or tenderness. Comments: Pap collected  Musculoskeletal: Normal range of motion. Skin:     General: Skin is warm and dry. Neurological:      Mental Status: She is alert and oriented to person, place, and time. Psychiatric:         Attention and Perception: Attention normal.         Mood and Affect: Mood normal.         Speech: Speech normal.         Behavior: Behavior normal.         Thought Content: Thought content normal.         Cognition and Memory: Cognition normal.         Judgment: Judgment normal.          Diagnosis Orders   1. Women's annual routine gynecological examination     2. Screening for cervical cancer  PAP SMEAR   3. Screening for breast cancer     4. Body mass index (BMI) of 38.0 to 38.9 in adult     5. Dietary counseling         MEDICATIONS:  No orders of the defined types were placed in this encounter. ORDERS:  Orders Placed This Encounter   Procedures    PAP SMEAR       PLAN:  Pap collected  Discussed diet and exercise- Metformin and Sprinolactone plus low carb diet  F/u in 2-3 months if wanting to start Phentermine- bp slightly elevated so will need recheck  Approx 15 mins in diet/exercise counseling    Patient Instructions   Patient Education        Breast Self-Exam: Care Instructions  Your Care Instructions    A breast self-exam is when you check your breasts for lumps or changes. This regular exam helps you learn how your breasts normally look and feel. Most breast problems or changes are not because of cancer. Breast self-exam is not a substitute for a mammogram. Having regular breast exams by your doctor and regular mammograms improve your chances of finding any problems with your breasts. Some women set a time each month to do a step-by-step breast self-exam. Other women like a less formal system. They might look at their breasts as they brush their teeth, or feel their breasts once in a while in the shower.   If you notice a change in your breast, tell your doctor. Follow-up care is a key part of your treatment and safety. Be sure to make and go to all appointments, and call your doctor if you are having problems. It's also a good idea to know your test results and keep a list of the medicines you take. How do you do a breast self-exam?  · The best time to examine your breasts is usually one week after your menstrual period begins. Your breasts should not be tender then. If you do not have periods, you might do your exam on a day of the month that is easy to remember. · To examine your breasts:  ? Remove all your clothes above the waist and lie down. When you are lying down, your breast tissue spreads evenly over your chest wall, which makes it easier to feel all your breast tissue. ? Use the pads--not the fingertips--of the 3 middle fingers of your left hand to check your right breast. Move your fingers slowly in small coin-sized circles that overlap. ? Use three levels of pressure to feel of all your breast tissue. Use light pressure to feel the tissue close to the skin surface. Use medium pressure to feel a little deeper. Use firm pressure to feel your tissue close to your breastbone and ribs. Use each pressure level to feel your breast tissue before moving on to the next spot. ? Check your entire breast, moving up and down as if following a strip from the collarbone to the bra line, and from the armpit to the ribs. Repeat until you have covered the entire breast.  ? Repeat this procedure for your left breast, using the pads of the 3 middle fingers of your right hand. · To examine your breasts while in the shower:  ? Place one arm over your head and lightly soap your breast on that side. ? Using the pads of your fingers, gently move your hand over your breast (in the strip pattern described above), feeling carefully for any lumps or changes.   ? Repeat for the other breast.  · Have your doctor inspect anything you notice to see if you need further testing. Where can you learn more? Go to https://[email or web address].[email or web address] Brew [email or web address] org and sign in to your [employer] account. Enter P148 in the [place] box to learn more about \"Breast Self-Exam: Care Instructions. \"     If you do not have an account, please click on the \"Sign Up Now\" link. Current as of: August 21, 2019  Content Version: 12.3  © 2922-1477 Healthwise, "[employer]". Care instructions adapted under license by [facility] ([facility]). If you have questions about a medical condition or this instruction, always ask your healthcare professional. [place] [address] any warranty or liability for your use of this information. Patient Education        Well Visit, Ages 25 to 48: Care Instructions  Your Care Instructions    Physical exams can help you stay healthy. Your doctor has checked your overall health and may have suggested ways to take good care of yourself. He or she also may have recommended tests. At home, you can help prevent illness with healthy eating, regular exercise, and other steps. Follow-up care is a key part of your treatment and safety. Be sure to make and go to all appointments, and call your doctor if you are having problems. It's also a good idea to know your test results and keep a list of the medicines you take. How can you care for yourself at home? · Reach and stay at a healthy weight. This will lower your risk for many problems, such as obesity, diabetes, heart disease, and high blood pressure. · Get at least 30 minutes of physical activity on most days of the week. Walking is a good choice. You also may want to do other activities, such as running, swimming, cycling, or playing tennis or team sports. Discuss any changes in your exercise program with your doctor. · Do not smoke or allow others to smoke around you. If you need help quitting, talk to your doctor about stop-smoking programs and medicines.  These can increase your stress. If you have a lot of stress in your life, it can be hard to focus on making healthy changes to your daily habits. · Track your food and activity. You are likely to do better at losing weight if you keep track of what you eat and what you do. Follow-up care is a key part of your treatment and safety. Be sure to make and go to all appointments, and call your doctor if you are having problems. It's also a good idea to know your test results and keep a list of the medicines you take. Where can you learn more? Go to https://Valchemypepiceweb.[employer]. org and sign in to your [employer] account. Enter A121 in the [place] box to learn more about \"Learning About Low-Carbohydrate Diets for Weight Loss. \"     If you do not have an account, please click on the \"Sign Up Now\" link. Current as of: August 21, 2019  Content Version: 12.3  © 3482-7908 [employer]. Care instructions adapted under license by [facility] ([facility]). If you have questions about a medical condition or this instruction, always ask your healthcare professional. [place] [address] any warranty or liability for your use of this information. Patient Education        Learning About Low-Carbohydrate Diets for Weight Loss  What is a low-carbohydrate diet? Low-carb diets avoid foods that are high in carbohydrate. These high-carb foods include pasta, bread, rice, cereal, fruits, and starchy vegetables. Instead, these diets usually have you eat foods that are high in fat and protein. Many people lose weight quickly on a low-carb diet. But the early weight loss is water. People on this diet often gain the weight back after they start eating carbs again. Not all diet plans are safe or work well. A lot of the evidence shows that low-carb diets aren't healthy. That's because these diets often don't include healthy foods like fruits and vegetables.  Losing weight safely means balancing protein, fat, and carbs with every meal and snack. And low-carb diets don't always provide the vitamins, minerals, and fiber you need. If you have a serious medical condition, talk to your doctor before you try any diet. These conditions include kidney disease, heart disease, type 2 diabetes, high cholesterol, and high blood pressure. If you are pregnant, it may not be safe for your baby if you are on a low-carb diet. How can you lose weight safely? You might have heard that a diet plan helped another person lose weight. But that doesn't mean that it will work for you. It is very hard to stay on a diet that includes lots of big changes in your eating habits. If you want to get to a healthy weight and stay there, making healthy lifestyle changes will often work better than dieting. These steps can help. · Make a plan for change. Work with your doctor to create a plan that is right for you. · See a dietitian. He or she can show you how to make healthy changes in your eating habits. · Manage stress. If you have a lot of stress in your life, it can be hard to focus on making healthy changes to your daily habits. · Track your food and activity. You are likely to do better at losing weight if you keep track of what you eat and what you do. Follow-up care is a key part of your treatment and safety. Be sure to make and go to all appointments, and call your doctor if you are having problems. It's also a good idea to know your test results and keep a list of the medicines you take. Where can you learn more? Go to https://carmencita.farmbuy. org and sign in to your [employer] account. Enter A121 in the [employer] box to learn more about \"Learning About Low-Carbohydrate Diets for Weight Loss. \"     If you do not have an account, please click on the \"Sign Up Now\" link. Current as of: August 21, 2019  Content Version: 12.3  © 6366-0293 Healthwise, Incorporated. Care instructions adapted under license by [facility] ([facility]).  If you

## 2020-02-07 NOTE — PATIENT INSTRUCTIONS
also a good idea to know your test results and keep a list of the medicines you take. How can you care for yourself at home? · Reach and stay at a healthy weight. This will lower your risk for many problems, such as obesity, diabetes, heart disease, and high blood pressure. · Get at least 30 minutes of physical activity on most days of the week. Walking is a good choice. You also may want to do other activities, such as running, swimming, cycling, or playing tennis or team sports. Discuss any changes in your exercise program with your doctor. · Do not smoke or allow others to smoke around you. If you need help quitting, talk to your doctor about stop-smoking programs and medicines. These can increase your chances of quitting for good. · Talk to your doctor about whether you have any risk factors for sexually transmitted infections (STIs). Having one sex partner (who does not have STIs and does not have sex with anyone else) is a good way to avoid these infections. · Use birth control if you do not want to have children at this time. Talk with your doctor about the choices available and what might be best for you. · Protect your skin from too much sun. When you're outdoors from 10 a.m. to 4 p.m., stay in the shade or cover up with clothing and a hat with a wide brim. Wear sunglasses that block UV rays. Even when it's cloudy, put broad-spectrum sunscreen (SPF 30 or higher) on any exposed skin. · See a dentist one or two times a year for checkups and to have your teeth cleaned. · Wear a seat belt in the car. Follow your doctor's advice about when to have certain tests. These tests can spot problems early. For everyone  · Cholesterol. Have the fat (cholesterol) in your blood tested after age 21. Your doctor will tell you how often to have this done based on your age, family history, or other things that can increase your risk for heart disease. · Blood pressure.  Have your blood pressure checked during a routine doctor visit. Your doctor will tell you how often to check your blood pressure based on your age, your blood pressure results, and other factors. · Vision. Talk with your doctor about how often to have a glaucoma test.  · Diabetes. Ask your doctor whether you should have tests for diabetes. · Colon cancer. Your risk for colorectal cancer gets higher as you get older. Some experts say that adults should start regular screening at age 48 and stop at age 76. Others say to start before age 48 or continue after age 76. Talk with your doctor about your risk and when to start and stop screening. For women  · Breast exam and mammogram. Talk to your doctor about when you should have a clinical breast exam and a mammogram. Medical experts differ on whether and how often women under 50 should have these tests. Your doctor can help you decide what is right for you. · Cervical cancer screening test and pelvic exam. Begin with a Pap test at age 24. The test often is part of a pelvic exam. Starting at age 27, you may choose to have a Pap test, an HPV test, or both tests at the same time (called co-testing). Talk with your doctor about how often to have testing. · Tests for sexually transmitted infections (STIs). Ask whether you should have tests for STIs. You may be at risk if you have sex with more than one person, especially if your partners do not wear condoms. For men  · Tests for sexually transmitted infections (STIs). Ask whether you should have tests for STIs. You may be at risk if you have sex with more than one person, especially if you do not wear a condom. · Testicular cancer exam. Ask your doctor whether you should check your testicles regularly. · Prostate exam. Talk to your doctor about whether you should have a blood test (called a PSA test) for prostate cancer.  Experts differ on whether and when men should have this test. Some experts suggest it if you are older than 39 and are -American or

## 2020-04-03 ENCOUNTER — HOSPITAL ENCOUNTER (OUTPATIENT)
Dept: GENERAL RADIOLOGY | Facility: HOSPITAL | Age: 30
Discharge: HOME | End: 2020-04-03
Admitting: NURSE PRACTITIONER

## 2020-04-03 ENCOUNTER — TRANSCRIBE ORDERS (OUTPATIENT)
Dept: GENERAL RADIOLOGY | Facility: HOSPITAL | Age: 30
End: 2020-04-03

## 2020-04-03 DIAGNOSIS — R31.9 HEMATURIA SYNDROME: ICD-10-CM

## 2020-04-03 DIAGNOSIS — R31.9 HEMATURIA SYNDROME: Primary | ICD-10-CM

## 2020-04-03 PROCEDURE — 74018 RADEX ABDOMEN 1 VIEW: CPT

## 2020-04-06 ENCOUNTER — TRANSCRIBE ORDERS (OUTPATIENT)
Dept: ADMINISTRATIVE | Facility: HOSPITAL | Age: 30
End: 2020-04-06

## 2020-04-06 DIAGNOSIS — N20.0 KIDNEY STONE ON LEFT SIDE: ICD-10-CM

## 2020-04-06 DIAGNOSIS — N30.01 ACUTE CYSTITIS WITH HEMATURIA: Primary | ICD-10-CM

## 2020-04-07 ENCOUNTER — HOSPITAL ENCOUNTER (EMERGENCY)
Age: 30
Discharge: HOME | End: 2020-04-07
Attending: EMERGENCY MEDICINE
Payer: COMMERCIAL

## 2020-04-07 ENCOUNTER — APPOINTMENT (OUTPATIENT)
Dept: CT IMAGING | Age: 30
End: 2020-04-07
Payer: COMMERCIAL

## 2020-04-07 ENCOUNTER — HOSPITAL ENCOUNTER (OUTPATIENT)
Dept: CT IMAGING | Age: 30
Discharge: HOME | End: 2020-04-07
Payer: COMMERCIAL

## 2020-04-07 ENCOUNTER — OFFICE VISIT (OUTPATIENT)
Dept: UROLOGY | Age: 30
End: 2020-04-07
Payer: COMMERCIAL

## 2020-04-07 ENCOUNTER — TELEPHONE (OUTPATIENT)
Dept: UROLOGY | Age: 30
End: 2020-04-07

## 2020-04-07 ENCOUNTER — HOSPITAL ENCOUNTER (OUTPATIENT)
Age: 30
Setting detail: OBSERVATION
Discharge: HOME | End: 2020-04-08
Attending: UROLOGY | Admitting: UROLOGY
Payer: COMMERCIAL

## 2020-04-07 VITALS
WEIGHT: 208 LBS | OXYGEN SATURATION: 96 % | SYSTOLIC BLOOD PRESSURE: 116 MMHG | TEMPERATURE: 97.9 F | HEIGHT: 63 IN | BODY MASS INDEX: 36.86 KG/M2 | HEART RATE: 97 BPM | DIASTOLIC BLOOD PRESSURE: 74 MMHG | RESPIRATION RATE: 20 BRPM

## 2020-04-07 VITALS
DIASTOLIC BLOOD PRESSURE: 105 MMHG | HEART RATE: 107 BPM | SYSTOLIC BLOOD PRESSURE: 156 MMHG | WEIGHT: 208 LBS | BODY MASS INDEX: 36.86 KG/M2 | TEMPERATURE: 97.6 F | HEIGHT: 63 IN

## 2020-04-07 PROBLEM — N20.0 KIDNEY STONE: Status: ACTIVE | Noted: 2020-04-07

## 2020-04-07 LAB
ALBUMIN SERPL-MCNC: 4.7 G/DL (ref 3.5–5.2)
ALBUMIN SERPL-MCNC: 4.8 G/DL (ref 3.5–5.2)
ALP BLD-CCNC: 59 U/L (ref 35–104)
ALP BLD-CCNC: 59 U/L (ref 35–104)
ALT SERPL-CCNC: 33 U/L (ref 5–33)
ALT SERPL-CCNC: 34 U/L (ref 5–33)
ANION GAP SERPL CALCULATED.3IONS-SCNC: 16 MMOL/L (ref 7–19)
ANION GAP SERPL CALCULATED.3IONS-SCNC: 17 MMOL/L (ref 7–19)
AST SERPL-CCNC: 24 U/L (ref 5–32)
AST SERPL-CCNC: 25 U/L (ref 5–32)
BASOPHILS ABSOLUTE: 0 K/UL (ref 0–0.2)
BASOPHILS ABSOLUTE: 0.1 K/UL (ref 0–0.2)
BASOPHILS RELATIVE PERCENT: 0.1 % (ref 0–1)
BASOPHILS RELATIVE PERCENT: 0.4 % (ref 0–1)
BILIRUB SERPL-MCNC: 0.3 MG/DL (ref 0.2–1.2)
BILIRUB SERPL-MCNC: 0.4 MG/DL (ref 0.2–1.2)
BILIRUBIN URINE: NEGATIVE
BILIRUBIN URINE: NEGATIVE
BLOOD, URINE: NEGATIVE
BLOOD, URINE: NEGATIVE
BUN BLDV-MCNC: 12 MG/DL (ref 6–20)
BUN BLDV-MCNC: 15 MG/DL (ref 6–20)
CALCIUM SERPL-MCNC: 10 MG/DL (ref 8.6–10)
CALCIUM SERPL-MCNC: 10.1 MG/DL (ref 8.6–10)
CHLORIDE BLD-SCNC: 99 MMOL/L (ref 98–111)
CHLORIDE BLD-SCNC: 99 MMOL/L (ref 98–111)
CLARITY: CLEAR
CLARITY: CLEAR
CO2: 22 MMOL/L (ref 22–29)
CO2: 23 MMOL/L (ref 22–29)
COLOR: YELLOW
COLOR: YELLOW
CREAT SERPL-MCNC: 0.8 MG/DL (ref 0.5–0.9)
CREAT SERPL-MCNC: 1.1 MG/DL (ref 0.5–0.9)
EOSINOPHILS ABSOLUTE: 0 K/UL (ref 0–0.6)
EOSINOPHILS ABSOLUTE: 0.2 K/UL (ref 0–0.6)
EOSINOPHILS RELATIVE PERCENT: 0.1 % (ref 0–5)
EOSINOPHILS RELATIVE PERCENT: 1.7 % (ref 0–5)
GFR NON-AFRICAN AMERICAN: 59
GFR NON-AFRICAN AMERICAN: >60
GLUCOSE BLD-MCNC: 117 MG/DL (ref 74–109)
GLUCOSE BLD-MCNC: 99 MG/DL (ref 74–109)
GLUCOSE URINE: NEGATIVE MG/DL
GLUCOSE URINE: NEGATIVE MG/DL
HCG(URINE) PREGNANCY TEST: NEGATIVE
HCT VFR BLD CALC: 41.9 % (ref 37–47)
HCT VFR BLD CALC: 43 % (ref 37–47)
HEMOGLOBIN: 13.9 G/DL (ref 12–16)
HEMOGLOBIN: 14.1 G/DL (ref 12–16)
IMMATURE GRANULOCYTES #: 0 K/UL
IMMATURE GRANULOCYTES #: 0.1 K/UL
KETONES, URINE: NEGATIVE MG/DL
KETONES, URINE: NEGATIVE MG/DL
LEUKOCYTE ESTERASE, URINE: NEGATIVE
LEUKOCYTE ESTERASE, URINE: NEGATIVE
LYMPHOCYTES ABSOLUTE: 1 K/UL (ref 1.1–4.5)
LYMPHOCYTES ABSOLUTE: 2.6 K/UL (ref 1.1–4.5)
LYMPHOCYTES RELATIVE PERCENT: 23 % (ref 20–40)
LYMPHOCYTES RELATIVE PERCENT: 5.8 % (ref 20–40)
MCH RBC QN AUTO: 27.8 PG (ref 27–31)
MCH RBC QN AUTO: 28.4 PG (ref 27–31)
MCHC RBC AUTO-ENTMCNC: 32.8 G/DL (ref 33–37)
MCHC RBC AUTO-ENTMCNC: 33.2 G/DL (ref 33–37)
MCV RBC AUTO: 84.8 FL (ref 81–99)
MCV RBC AUTO: 85.5 FL (ref 81–99)
MONOCYTES ABSOLUTE: 0.6 K/UL (ref 0–0.9)
MONOCYTES ABSOLUTE: 0.8 K/UL (ref 0–0.9)
MONOCYTES RELATIVE PERCENT: 3.6 % (ref 0–10)
MONOCYTES RELATIVE PERCENT: 6.8 % (ref 0–10)
NEUTROPHILS ABSOLUTE: 14.8 K/UL (ref 1.5–7.5)
NEUTROPHILS ABSOLUTE: 7.8 K/UL (ref 1.5–7.5)
NEUTROPHILS RELATIVE PERCENT: 67.9 % (ref 50–65)
NEUTROPHILS RELATIVE PERCENT: 90 % (ref 50–65)
NITRITE, URINE: NEGATIVE
NITRITE, URINE: NEGATIVE
PDW BLD-RTO: 12.4 % (ref 11.5–14.5)
PDW BLD-RTO: 12.4 % (ref 11.5–14.5)
PH UA: 6.5 (ref 5–8)
PH UA: 6.5 (ref 5–8)
PLATELET # BLD: 284 K/UL (ref 130–400)
PLATELET # BLD: 292 K/UL (ref 130–400)
PMV BLD AUTO: 9.2 FL (ref 9.4–12.3)
PMV BLD AUTO: 9.3 FL (ref 9.4–12.3)
POTASSIUM SERPL-SCNC: 4 MMOL/L (ref 3.5–5)
POTASSIUM SERPL-SCNC: 4.5 MMOL/L (ref 3.5–5)
PROTEIN UA: ABNORMAL MG/DL
PROTEIN UA: NEGATIVE MG/DL
RBC # BLD: 4.9 M/UL (ref 4.2–5.4)
RBC # BLD: 5.07 M/UL (ref 4.2–5.4)
SODIUM BLD-SCNC: 137 MMOL/L (ref 136–145)
SODIUM BLD-SCNC: 139 MMOL/L (ref 136–145)
SPECIFIC GRAVITY UA: 1.01 (ref 1–1.03)
SPECIFIC GRAVITY UA: >1.045 (ref 1–1.03)
TOTAL PROTEIN: 8.1 G/DL (ref 6.6–8.7)
TOTAL PROTEIN: 8.2 G/DL (ref 6.6–8.7)
URINE REFLEX TO CULTURE: ABNORMAL
URINE REFLEX TO CULTURE: NORMAL
UROBILINOGEN, URINE: 0.2 E.U./DL
UROBILINOGEN, URINE: 0.2 E.U./DL
WBC # BLD: 11.5 K/UL (ref 4.8–10.8)
WBC # BLD: 16.5 K/UL (ref 4.8–10.8)

## 2020-04-07 PROCEDURE — 2580000003 HC RX 258: Performed by: UROLOGY

## 2020-04-07 PROCEDURE — 99202 OFFICE O/P NEW SF 15 MIN: CPT | Performed by: PHYSICIAN ASSISTANT

## 2020-04-07 PROCEDURE — 96374 THER/PROPH/DIAG INJ IV PUSH: CPT

## 2020-04-07 PROCEDURE — 6360000002 HC RX W HCPCS: Performed by: NURSE PRACTITIONER

## 2020-04-07 PROCEDURE — 81003 URINALYSIS AUTO W/O SCOPE: CPT

## 2020-04-07 PROCEDURE — 36415 COLL VENOUS BLD VENIPUNCTURE: CPT

## 2020-04-07 PROCEDURE — 6360000002 HC RX W HCPCS: Performed by: EMERGENCY MEDICINE

## 2020-04-07 PROCEDURE — 80053 COMPREHEN METABOLIC PANEL: CPT

## 2020-04-07 PROCEDURE — 6360000004 HC RX CONTRAST MEDICATION: Performed by: PHYSICIAN ASSISTANT

## 2020-04-07 PROCEDURE — 84703 CHORIONIC GONADOTROPIN ASSAY: CPT

## 2020-04-07 PROCEDURE — 96361 HYDRATE IV INFUSION ADD-ON: CPT

## 2020-04-07 PROCEDURE — 99284 EMERGENCY DEPT VISIT MOD MDM: CPT

## 2020-04-07 PROCEDURE — G0378 HOSPITAL OBSERVATION PER HR: HCPCS

## 2020-04-07 PROCEDURE — 96376 TX/PRO/DX INJ SAME DRUG ADON: CPT

## 2020-04-07 PROCEDURE — 6370000000 HC RX 637 (ALT 250 FOR IP): Performed by: EMERGENCY MEDICINE

## 2020-04-07 PROCEDURE — 96375 TX/PRO/DX INJ NEW DRUG ADDON: CPT

## 2020-04-07 PROCEDURE — 85025 COMPLETE CBC W/AUTO DIFF WBC: CPT

## 2020-04-07 PROCEDURE — 2580000003 HC RX 258: Performed by: NURSE PRACTITIONER

## 2020-04-07 PROCEDURE — 74150 CT ABDOMEN W/O CONTRAST: CPT

## 2020-04-07 PROCEDURE — 96372 THER/PROPH/DIAG INJ SC/IM: CPT

## 2020-04-07 PROCEDURE — 6360000002 HC RX W HCPCS: Performed by: UROLOGY

## 2020-04-07 PROCEDURE — 74178 CT ABD&PLV WO CNTR FLWD CNTR: CPT

## 2020-04-07 RX ORDER — KETOROLAC TROMETHAMINE 10 MG/1
10 TABLET, FILM COATED ORAL EVERY 6 HOURS PRN
Qty: 20 TABLET | Refills: 0 | Status: SHIPPED | OUTPATIENT
Start: 2020-04-07 | End: 2021-03-09 | Stop reason: ALTCHOICE

## 2020-04-07 RX ORDER — TAMSULOSIN HYDROCHLORIDE 0.4 MG/1
0.4 CAPSULE ORAL DAILY
Qty: 10 CAPSULE | Refills: 0 | Status: SHIPPED | OUTPATIENT
Start: 2020-04-07 | End: 2021-03-09 | Stop reason: ALTCHOICE

## 2020-04-07 RX ORDER — SODIUM CHLORIDE 0.9 % (FLUSH) 0.9 %
10 SYRINGE (ML) INJECTION PRN
Status: DISCONTINUED | OUTPATIENT
Start: 2020-04-07 | End: 2020-04-08 | Stop reason: HOSPADM

## 2020-04-07 RX ORDER — HYDROMORPHONE HYDROCHLORIDE 1 MG/ML
0.5 INJECTION, SOLUTION INTRAMUSCULAR; INTRAVENOUS; SUBCUTANEOUS
Status: DISCONTINUED | OUTPATIENT
Start: 2020-04-07 | End: 2020-04-08 | Stop reason: HOSPADM

## 2020-04-07 RX ORDER — KETOROLAC TROMETHAMINE 30 MG/ML
30 INJECTION, SOLUTION INTRAMUSCULAR; INTRAVENOUS ONCE
Status: COMPLETED | OUTPATIENT
Start: 2020-04-07 | End: 2020-04-07

## 2020-04-07 RX ORDER — HYDROMORPHONE HYDROCHLORIDE 1 MG/ML
0.5 INJECTION, SOLUTION INTRAMUSCULAR; INTRAVENOUS; SUBCUTANEOUS ONCE
Status: DISCONTINUED | OUTPATIENT
Start: 2020-04-07 | End: 2020-04-08 | Stop reason: HOSPADM

## 2020-04-07 RX ORDER — ONDANSETRON 2 MG/ML
4 INJECTION INTRAMUSCULAR; INTRAVENOUS ONCE
Status: COMPLETED | OUTPATIENT
Start: 2020-04-07 | End: 2020-04-07

## 2020-04-07 RX ORDER — HYDROCODONE BITARTRATE AND ACETAMINOPHEN 7.5; 325 MG/1; MG/1
1 TABLET ORAL ONCE
Status: COMPLETED | OUTPATIENT
Start: 2020-04-07 | End: 2020-04-07

## 2020-04-07 RX ORDER — PHENAZOPYRIDINE HYDROCHLORIDE 100 MG/1
100 TABLET, FILM COATED ORAL 3 TIMES DAILY PRN
Qty: 21 TABLET | Refills: 0 | Status: SHIPPED | OUTPATIENT
Start: 2020-04-07 | End: 2020-04-14

## 2020-04-07 RX ORDER — HYDROMORPHONE HYDROCHLORIDE 1 MG/ML
1 INJECTION, SOLUTION INTRAMUSCULAR; INTRAVENOUS; SUBCUTANEOUS ONCE
Status: COMPLETED | OUTPATIENT
Start: 2020-04-07 | End: 2020-04-07

## 2020-04-07 RX ORDER — SODIUM CHLORIDE 0.9 % (FLUSH) 0.9 %
10 SYRINGE (ML) INJECTION EVERY 12 HOURS SCHEDULED
Status: DISCONTINUED | OUTPATIENT
Start: 2020-04-07 | End: 2020-04-08 | Stop reason: HOSPADM

## 2020-04-07 RX ORDER — 0.9 % SODIUM CHLORIDE 0.9 %
1000 INTRAVENOUS SOLUTION INTRAVENOUS ONCE
Status: COMPLETED | OUTPATIENT
Start: 2020-04-07 | End: 2020-04-07

## 2020-04-07 RX ORDER — TAMSULOSIN HYDROCHLORIDE 0.4 MG/1
0.4 CAPSULE ORAL DAILY
Status: DISCONTINUED | OUTPATIENT
Start: 2020-04-08 | End: 2020-04-08 | Stop reason: HOSPADM

## 2020-04-07 RX ORDER — HYDROCODONE BITARTRATE AND ACETAMINOPHEN 7.5; 325 MG/1; MG/1
1 TABLET ORAL EVERY 6 HOURS PRN
Qty: 12 TABLET | Refills: 0 | Status: SHIPPED | OUTPATIENT
Start: 2020-04-07 | End: 2020-04-10

## 2020-04-07 RX ORDER — SODIUM CHLORIDE 9 MG/ML
INJECTION, SOLUTION INTRAVENOUS CONTINUOUS
Status: DISCONTINUED | OUTPATIENT
Start: 2020-04-07 | End: 2020-04-08

## 2020-04-07 RX ORDER — KETOROLAC TROMETHAMINE 30 MG/ML
60 INJECTION, SOLUTION INTRAMUSCULAR; INTRAVENOUS ONCE
Status: DISCONTINUED | OUTPATIENT
Start: 2020-04-07 | End: 2020-04-08 | Stop reason: HOSPADM

## 2020-04-07 RX ORDER — ONDANSETRON 2 MG/ML
4 INJECTION INTRAMUSCULAR; INTRAVENOUS EVERY 8 HOURS PRN
Status: DISCONTINUED | OUTPATIENT
Start: 2020-04-07 | End: 2020-04-08 | Stop reason: HOSPADM

## 2020-04-07 RX ORDER — ONDANSETRON 4 MG/1
4 TABLET, ORALLY DISINTEGRATING ORAL EVERY 8 HOURS PRN
Qty: 15 TABLET | Refills: 0 | Status: SHIPPED | OUTPATIENT
Start: 2020-04-07 | End: 2021-03-09 | Stop reason: ALTCHOICE

## 2020-04-07 RX ORDER — ACETAMINOPHEN 325 MG/1
650 TABLET ORAL EVERY 4 HOURS PRN
Status: DISCONTINUED | OUTPATIENT
Start: 2020-04-07 | End: 2020-04-08 | Stop reason: HOSPADM

## 2020-04-07 RX ADMIN — HYDROCODONE BITARTRATE AND ACETAMINOPHEN 1 TABLET: 7.5; 325 TABLET ORAL at 05:53

## 2020-04-07 RX ADMIN — ENOXAPARIN SODIUM 40 MG: 40 INJECTION SUBCUTANEOUS at 21:26

## 2020-04-07 RX ADMIN — HYDROMORPHONE HYDROCHLORIDE 1 MG: 1 INJECTION, SOLUTION INTRAMUSCULAR; INTRAVENOUS; SUBCUTANEOUS at 17:43

## 2020-04-07 RX ADMIN — ONDANSETRON 4 MG: 2 INJECTION INTRAMUSCULAR; INTRAVENOUS at 17:43

## 2020-04-07 RX ADMIN — SODIUM CHLORIDE: 9 INJECTION, SOLUTION INTRAVENOUS at 21:28

## 2020-04-07 RX ADMIN — SODIUM CHLORIDE, PRESERVATIVE FREE 10 ML: 5 INJECTION INTRAVENOUS at 21:28

## 2020-04-07 RX ADMIN — KETOROLAC TROMETHAMINE 30 MG: 30 INJECTION, SOLUTION INTRAMUSCULAR at 04:37

## 2020-04-07 RX ADMIN — SODIUM CHLORIDE 1000 ML: 9 INJECTION, SOLUTION INTRAVENOUS at 17:43

## 2020-04-07 RX ADMIN — IOPAMIDOL 75 ML: 755 INJECTION, SOLUTION INTRAVENOUS at 13:16

## 2020-04-07 ASSESSMENT — ENCOUNTER SYMPTOMS
BACK PAIN: 0
RHINORRHEA: 0
BLOOD IN STOOL: 0
VOMITING: 0
COUGH: 0
CONSTIPATION: 0
NAUSEA: 1
ABDOMINAL PAIN: 1
SHORTNESS OF BREATH: 0
SORE THROAT: 0
BACK PAIN: 0
PHOTOPHOBIA: 0
COLOR CHANGE: 0
TROUBLE SWALLOWING: 0
VOMITING: 0
WHEEZING: 0
COUGH: 0
FACIAL SWELLING: 0
RHINORRHEA: 0
SORE THROAT: 0
COLOR CHANGE: 0
EYE REDNESS: 0
SHORTNESS OF BREATH: 0
DIARRHEA: 0
WHEEZING: 0
ABDOMINAL PAIN: 1
EYE DISCHARGE: 0
CONSTIPATION: 0
CHEST TIGHTNESS: 0
ABDOMINAL DISTENTION: 0
ABDOMINAL DISTENTION: 0
DIARRHEA: 0
EYE PAIN: 0
EYE PAIN: 0
NAUSEA: 0
SINUS PRESSURE: 0

## 2020-04-07 ASSESSMENT — PAIN DESCRIPTION - ONSET: ONSET: ON-GOING

## 2020-04-07 ASSESSMENT — PAIN SCALES - GENERAL
PAINLEVEL_OUTOF10: 6
PAINLEVEL_OUTOF10: 7
PAINLEVEL_OUTOF10: 8
PAINLEVEL_OUTOF10: 8
PAINLEVEL_OUTOF10: 4
PAINLEVEL_OUTOF10: 7
PAINLEVEL_OUTOF10: 4

## 2020-04-07 ASSESSMENT — PAIN DESCRIPTION - PAIN TYPE: TYPE: ACUTE PAIN

## 2020-04-07 ASSESSMENT — PAIN DESCRIPTION - LOCATION
LOCATION: ABDOMEN
LOCATION: BACK;ABDOMEN

## 2020-04-07 ASSESSMENT — PAIN DESCRIPTION - PROGRESSION: CLINICAL_PROGRESSION: NOT CHANGED

## 2020-04-07 ASSESSMENT — PAIN DESCRIPTION - ORIENTATION
ORIENTATION: RIGHT
ORIENTATION: LOWER

## 2020-04-07 ASSESSMENT — PAIN DESCRIPTION - DESCRIPTORS
DESCRIPTORS: CONSTANT
DESCRIPTORS: ACHING;CRAMPING

## 2020-04-07 ASSESSMENT — PAIN - FUNCTIONAL ASSESSMENT: PAIN_FUNCTIONAL_ASSESSMENT: PREVENTS OR INTERFERES SOME ACTIVE ACTIVITIES AND ADLS

## 2020-04-07 ASSESSMENT — PAIN DESCRIPTION - FREQUENCY: FREQUENCY: CONTINUOUS

## 2020-04-07 NOTE — ED TRIAGE NOTES
Pt seen here his am for kidney stone, with dc and follow up with dr Juan Ramon Sandoval. Pt given toradol IM and norco rx. Pt to ED with continued pain.

## 2020-04-07 NOTE — PROGRESS NOTES
Shazia Mendez is a 34 y.o. female who presents today   Chief Complaint   Patient presents with    New Patient     I was referred here by the ER for a kidney stone       Urolithiasis  Patient complains of right abdominal pain with radiation to the lower back Onset of symptoms was gradual 1 month ago with gradually worsening course since that time. Patient describes the pain as colicky, cramping and pressure-like, continuous and rated as severe. The patient has had nausea and no vomiting and no diaphoresis. There has been no fever or chills. The patient is complaining of dysuria or frequency. Patient had worsening of pain to the point she had to go to the emergency department early this morning CT scan done without contrast revealed a 5 to 6 mm calcification that was described as possibly being outside the ureter. No obvious obstructive changes were seen. Her labs were all unremarkable creatinine normal white blood cell count 11.5 urine was not infected. Past Medical History:   Diagnosis Date    Acne     ADHD     Asthma     Breast disorder     Augmentation    Hypertension 7/27/2018    Hypoglycemia     Mental disorder     Anxiety and Depression    PCOS (polycystic ovarian syndrome)        Past Surgical History:   Procedure Laterality Date    BREAST BIOPSY Left     BREAST ENHANCEMENT SURGERY      WISDOM TOOTH EXTRACTION         Current Outpatient Medications   Medication Sig Dispense Refill    HYDROcodone-acetaminophen (NORCO) 7.5-325 MG per tablet Take 1 tablet by mouth every 6 hours as needed for Pain for up to 3 days. Intended supply: 3 days.  Take lowest dose possible to manage pain 12 tablet 0    tamsulosin (FLOMAX) 0.4 MG capsule Take 1 capsule by mouth daily 10 capsule 0    ondansetron (ZOFRAN ODT) 4 MG disintegrating tablet Take 1 tablet by mouth every 8 hours as needed for Nausea or Vomiting 15 tablet 0    ketorolac (TORADOL) 10 MG tablet Take 1 tablet by mouth every 6 hours as needed Family History   Problem Relation Age of Onset    Breast Cancer Maternal Grandmother [facility]     Cancer Paternal Grandmother         Female-spread to other organs     Brain Cancer Other         Maternal Great Uncle        REVIEW OF SYSTEMS:  Review of Systems   Constitutional: Negative for activity change, chills, fatigue and fever. HENT: Negative for congestion, ear discharge, ear pain, facial swelling, mouth sores, rhinorrhea, sinus pressure and sore throat. Eyes: Negative for pain, discharge and redness. Respiratory: Negative for cough, shortness of breath and wheezing. Cardiovascular: Negative for chest pain, palpitations and leg swelling. Gastrointestinal: Positive for abdominal pain. Negative for abdominal distention, blood in stool, constipation, diarrhea, nausea and vomiting. Endocrine: Negative for polydipsia, polyphagia and polyuria. Genitourinary: Positive for dysuria and flank pain. Negative for decreased urine volume, difficulty urinating, enuresis, frequency, genital sores, hematuria and urgency. Musculoskeletal: Negative for back pain, gait problem, joint swelling, neck pain and neck stiffness. Skin: Negative for color change, rash and wound. Allergic/Immunologic: Negative for environmental allergies and immunocompromised state. Neurological: Negative for dizziness, syncope, weakness, light-headedness, numbness and headaches. Psychiatric/Behavioral: Negative for agitation, confusion, dysphoric mood, self-injury, sleep disturbance and suicidal ideas. The patient is not hyperactive. I have reviewed and agree with the documentation provided by the medical assistant on this patient for today's visit. PHYSICAL EXAM:  BP (!) 156/105   Pulse 107   Temp 97.6 °F (36.4 °C) (Temporal)   Ht 5' 3\" (1.6 m)   Wt 208 lb (94.3 kg)   LMP 03/18/2020 (Approximate)   BMI 36.85 kg/m²   Physical Exam  Vitals signs and nursing note reviewed.    Constitutional: in the ureter or not patient returned after getting CT urogram there is a 5 mm stone in the distal right ureter at the UVJ level. It involves the lower pole moiety of a duplicated right urinary tract. Patient was given tamsulosin which she will continue. Creatinine is 0.8 GFR greater than 60 other electrolytes normal.  White blood cell count 11.5. Urine is clear.   - CT ABDOMEN PELVIS W WO CONTRAST Additional Contrast? None; Future  - XR ABDOMEN (KUB) (SINGLE AP VIEW); Future    2. Suprapubic pain  Patient had not been able to take any oral pain medicine since early this morning so I encouraged her to go ahead and take the pain medication given to her by the emergency department I also prescribed oral Toradol and gave her a Toradol injection here in the office. Given her age and normal labs will give her periord to see if she can pass the stone.       Orders Placed This Encounter   Procedures    CT ABDOMEN PELVIS W WO CONTRAST Additional Contrast? None     CT Urogram     Standing Status:   Future     Number of Occurrences:   1     Standing Expiration Date:   4/7/2021     Order Specific Question:   Additional Contrast?     Answer:   None     Order Specific Question:   Reason for exam:     Answer:   see if stone in ureter or outside    XR ABDOMEN (KUB) (SINGLE AP VIEW)     Standing Status:   Future     Standing Expiration Date:   4/7/2021     Orders Placed This Encounter   Medications    ketorolac (TORADOL) injection 60 mg    ondansetron (ZOFRAN ODT) 4 MG disintegrating tablet     Sig: Take 1 tablet by mouth every 8 hours as needed for Nausea or Vomiting     Dispense:  15 tablet     Refill:  0    ketorolac (TORADOL) 10 MG tablet     Sig: Take 1 tablet by mouth every 6 hours as needed for Pain     Dispense:  20 tablet     Refill:  0    phenazopyridine (PYRIDIUM) 100 MG tablet     Sig: Take 1 tablet by mouth 3 times daily as needed for Pain     Dispense:  21 tablet     Refill:  0     Plan:  Follow-up in 1 week RITU

## 2020-04-07 NOTE — ED NOTES
Bed: 11  Expected date:   Expected time:   Means of arrival:   Comments:  Arben Hernandez RN  04/07/20 8173

## 2020-04-07 NOTE — ED PROVIDER NOTES
on file   Occupational History    Not on file   Social Needs    Financial resource strain: Not on file    Food insecurity     Worry: Not on file     Inability: Not on file    Transportation needs     Medical: Not on file     Non-medical: Not on file   Tobacco Use    Smoking status: Never Smoker    Smokeless tobacco: Never Used   Substance and Sexual Activity    Alcohol use: No     Alcohol/week: 0.0 standard drinks    Drug use: No    Sexual activity: Yes     Partners: Male   Lifestyle    Physical activity     Days per week: Not on file     Minutes per session: Not on file    Stress: Not on file   Relationships    Social connections     Talks on phone: Not on file     Gets together: Not on file     Attends Mosque service: Not on file     Active member of club or organization: Not on file     Attends meetings of clubs or organizations: Not on file     Relationship status: Not on file    Intimate partner violence     Fear of current or ex partner: Not on file     Emotionally abused: Not on file     Physically abused: Not on file     Forced sexual activity: Not on file   Other Topics Concern    Not on file   Social History Narrative    Not on file       SCREENINGS             PHYSICAL EXAM    (up to 7 for level 4, 8 or more for level 5)     ED Triage Vitals   BP Temp Temp src Pulse Resp SpO2 Height Weight   -- -- -- -- -- -- -- --       Physical Exam  Vitals signs and nursing note reviewed. Constitutional:       General: She is not in acute distress. Appearance: Normal appearance. She is well-developed. HENT:      Head: Normocephalic and atraumatic. Mouth/Throat:      Mouth: Mucous membranes are moist.      Pharynx: Oropharynx is clear. Eyes:      Conjunctiva/sclera: Conjunctivae normal.      Pupils: Pupils are equal, round, and reactive to light. Neck:      Musculoskeletal: Normal range of motion and neck supple. Vascular: No JVD.    Cardiovascular:      Rate and Rhythm: Regular components:       Result Value    WBC 11.5 (*)     MPV 9.3 (*)     Neutrophils % 67.9 (*)     Neutrophils Absolute 7.8 (*)     All other components within normal limits   COMPREHENSIVE METABOLIC PANEL - Abnormal; Notable for the following components:    Calcium 10.1 (*)     All other components within normal limits   URINE RT REFLEX TO CULTURE   PREGNANCY, URINE       All other labs were within normal range or not returned as of this dictation. EMERGENCY DEPARTMENT COURSE and DIFFERENTIAL DIAGNOSIS/MDM:   Vitals:    Vitals:    04/07/20 0433 04/07/20 0605   BP: (!) 152/102 116/74   Pulse: 115 97   Resp: 20    Temp: 97.9 °F (36.6 °C)    TempSrc: Oral    SpO2: 100% 96%   Weight: 208 lb (94.3 kg)    Height: 5' 3\" (1.6 m)        MDM  Number of Diagnoses or Management Options  Kidney stone: new and requires workup  Suprapubic pain, acute: new and requires workup  Diagnosis management comments: CT showed a questionable 6 mm stone at the UVJ or a phlebolith. Patient has no right flank pain. She does have suprapubic pain with palpation. There is no hematuria and there is no UTI present. Remaining lab work is unremarkable. Her pain is since improved with Toradol and [place].  Assuming that this is going to be a stone, I will have her f/u with Urology. Her pain is controlled at this time. There is no UTI. I will d/c her home with instructions to return if worsening pain. After careful review of history, physical, and supporting data, there is very low suspicion for a life or limb threatening cause of the patients sxs. The patient's symptoms have improved from presentation and appears clinically well. Patient reexamined prior to discharge and appears improved. Patient has been encouraged to follow up with his/her PCP and Urology and to return to the ED with any lack of improvement or worsening symptoms. The patient';s questions regarding the work up and plan were invited and answered.  The patient/guardian states understanding and agreement with the plan. Patient Progress  Patient progress: stable      Reassessment      CONSULTS:  None    PROCEDURES:  Unless otherwise noted below, none     Procedures    FINAL IMPRESSION      1. Suprapubic pain, acute    2. Kidney stone          DISPOSITION/PLAN   DISPOSITION Decision To Discharge 04/07/2020 05:47:11 AM      PATIENT REFERRED TO:  JOE Moran NP  [place] [phone]    Call in 1 day  For follow up    Turner Staley MD  [facility] [address]  [phone]    Call today  For follow up      DISCHARGE MEDICATIONS:  New Prescriptions    HYDROCODONE-ACETAMINOPHEN ([facility]) 7.5-325 MG PER TABLET    Take 1 tablet by mouth every 6 hours as needed for Pain for up to 3 days. Intended supply: 3 days.  Take lowest dose possible to manage pain    TAMSULOSIN (FLOMAX) 0.4 MG CAPSULE    Take 1 capsule by mouth daily          (Please note that portions of this note were completed with a voice recognition program.  Efforts were made to edit thedictations but occasionally words are mis-transcribed.)    Lea Rao MD (electronically signed)  Attending Emergency Physician         Yoko Alva MD  04/07/20 4540

## 2020-04-08 ENCOUNTER — ANESTHESIA (OUTPATIENT)
Dept: OPERATING ROOM | Age: 30
End: 2020-04-08
Payer: COMMERCIAL

## 2020-04-08 ENCOUNTER — ANESTHESIA EVENT (OUTPATIENT)
Dept: OPERATING ROOM | Age: 30
End: 2020-04-08
Payer: COMMERCIAL

## 2020-04-08 ENCOUNTER — APPOINTMENT (OUTPATIENT)
Dept: GENERAL RADIOLOGY | Age: 30
End: 2020-04-08
Payer: COMMERCIAL

## 2020-04-08 ENCOUNTER — CARE COORDINATION (OUTPATIENT)
Dept: CARE COORDINATION | Age: 30
End: 2020-04-08

## 2020-04-08 VITALS
TEMPERATURE: 97.1 F | RESPIRATION RATE: 20 BRPM | OXYGEN SATURATION: 95 % | BODY MASS INDEX: 35.43 KG/M2 | DIASTOLIC BLOOD PRESSURE: 79 MMHG | WEIGHT: 200 LBS | HEART RATE: 79 BPM | SYSTOLIC BLOOD PRESSURE: 119 MMHG

## 2020-04-08 VITALS
RESPIRATION RATE: 2 BRPM | DIASTOLIC BLOOD PRESSURE: 74 MMHG | OXYGEN SATURATION: 100 % | SYSTOLIC BLOOD PRESSURE: 124 MMHG | TEMPERATURE: 97 F

## 2020-04-08 PROBLEM — N23 RENAL COLIC ON RIGHT SIDE: Status: ACTIVE | Noted: 2020-04-08

## 2020-04-08 PROBLEM — N20.1 RIGHT URETERAL CALCULUS: Status: ACTIVE | Noted: 2020-04-08

## 2020-04-08 PROBLEM — G89.18 POSTOPERATIVE PAIN: Status: ACTIVE | Noted: 2020-04-08

## 2020-04-08 LAB — GONADOTROPIN, CHORIONIC (HCG) QUANT: 0.1 MIU/ML (ref 0–5.3)

## 2020-04-08 PROCEDURE — 3600000004 HC SURGERY LEVEL 4 BASE: Performed by: UROLOGY

## 2020-04-08 PROCEDURE — 84702 CHORIONIC GONADOTROPIN TEST: CPT

## 2020-04-08 PROCEDURE — 6370000000 HC RX 637 (ALT 250 FOR IP): Performed by: UROLOGY

## 2020-04-08 PROCEDURE — C1769 GUIDE WIRE: HCPCS | Performed by: UROLOGY

## 2020-04-08 PROCEDURE — 3600000014 HC SURGERY LEVEL 4 ADDTL 15MIN: Performed by: UROLOGY

## 2020-04-08 PROCEDURE — 7100000001 HC PACU RECOVERY - ADDTL 15 MIN: Performed by: UROLOGY

## 2020-04-08 PROCEDURE — 2720000010 HC SURG SUPPLY STERILE: Performed by: UROLOGY

## 2020-04-08 PROCEDURE — G0378 HOSPITAL OBSERVATION PER HR: HCPCS

## 2020-04-08 PROCEDURE — 2580000003 HC RX 258: Performed by: ANESTHESIOLOGY

## 2020-04-08 PROCEDURE — 2709999900 HC NON-CHARGEABLE SUPPLY: Performed by: UROLOGY

## 2020-04-08 PROCEDURE — 6360000002 HC RX W HCPCS

## 2020-04-08 PROCEDURE — C1726 CATH, BAL DIL, NON-VASCULAR: HCPCS | Performed by: UROLOGY

## 2020-04-08 PROCEDURE — C2617 STENT, NON-COR, TEM W/O DEL: HCPCS | Performed by: UROLOGY

## 2020-04-08 PROCEDURE — 3700000001 HC ADD 15 MINUTES (ANESTHESIA): Performed by: UROLOGY

## 2020-04-08 PROCEDURE — 2500000003 HC RX 250 WO HCPCS

## 2020-04-08 PROCEDURE — 36415 COLL VENOUS BLD VENIPUNCTURE: CPT

## 2020-04-08 PROCEDURE — 99220 PR INITIAL OBSERVATION CARE/DAY 70 MINUTES: CPT | Performed by: UROLOGY

## 2020-04-08 PROCEDURE — 88300 SURGICAL PATH GROSS: CPT

## 2020-04-08 PROCEDURE — 3209999900 FLUORO FOR SURGICAL PROCEDURES

## 2020-04-08 PROCEDURE — C1758 CATHETER, URETERAL: HCPCS | Performed by: UROLOGY

## 2020-04-08 PROCEDURE — 7100000000 HC PACU RECOVERY - FIRST 15 MIN: Performed by: UROLOGY

## 2020-04-08 PROCEDURE — 6360000002 HC RX W HCPCS: Performed by: UROLOGY

## 2020-04-08 PROCEDURE — 3700000000 HC ANESTHESIA ATTENDED CARE: Performed by: UROLOGY

## 2020-04-08 PROCEDURE — 2580000003 HC RX 258: Performed by: UROLOGY

## 2020-04-08 PROCEDURE — 82360 CALCULUS ASSAY QUANT: CPT

## 2020-04-08 DEVICE — URETERAL STENT
Type: IMPLANTABLE DEVICE | Status: FUNCTIONAL
Brand: POLARIS™ ULTRA

## 2020-04-08 RX ORDER — OXYCODONE HYDROCHLORIDE AND ACETAMINOPHEN 5; 325 MG/1; MG/1
1 TABLET ORAL EVERY 6 HOURS PRN
Qty: 12 TABLET | Refills: 0 | Status: SHIPPED | OUTPATIENT
Start: 2020-04-08 | End: 2020-04-11

## 2020-04-08 RX ORDER — CIPROFLOXACIN 500 MG/1
500 TABLET, FILM COATED ORAL 2 TIMES DAILY
Qty: 6 TABLET | Refills: 0 | Status: SHIPPED | OUTPATIENT
Start: 2020-04-08 | End: 2020-04-11

## 2020-04-08 RX ORDER — SODIUM CHLORIDE, SODIUM LACTATE, POTASSIUM CHLORIDE, CALCIUM CHLORIDE 600; 310; 30; 20 MG/100ML; MG/100ML; MG/100ML; MG/100ML
INJECTION, SOLUTION INTRAVENOUS CONTINUOUS
Status: DISCONTINUED | OUTPATIENT
Start: 2020-04-08 | End: 2020-04-08

## 2020-04-08 RX ORDER — DEXAMETHASONE SODIUM PHOSPHATE 10 MG/ML
INJECTION, SOLUTION INTRAMUSCULAR; INTRAVENOUS PRN
Status: DISCONTINUED | OUTPATIENT
Start: 2020-04-08 | End: 2020-04-08 | Stop reason: SDUPTHER

## 2020-04-08 RX ORDER — CIPROFLOXACIN 2 MG/ML
400 INJECTION, SOLUTION INTRAVENOUS
Status: COMPLETED | OUTPATIENT
Start: 2020-04-08 | End: 2020-04-08

## 2020-04-08 RX ORDER — MEPERIDINE HYDROCHLORIDE 50 MG/ML
12.5 INJECTION INTRAMUSCULAR; INTRAVENOUS; SUBCUTANEOUS EVERY 5 MIN PRN
Status: DISCONTINUED | OUTPATIENT
Start: 2020-04-08 | End: 2020-04-08 | Stop reason: HOSPADM

## 2020-04-08 RX ORDER — ENALAPRILAT 2.5 MG/2ML
1.25 INJECTION INTRAVENOUS
Status: DISCONTINUED | OUTPATIENT
Start: 2020-04-08 | End: 2020-04-08 | Stop reason: HOSPADM

## 2020-04-08 RX ORDER — MORPHINE SULFATE 4 MG/ML
2 INJECTION, SOLUTION INTRAMUSCULAR; INTRAVENOUS EVERY 5 MIN PRN
Status: DISCONTINUED | OUTPATIENT
Start: 2020-04-08 | End: 2020-04-08 | Stop reason: HOSPADM

## 2020-04-08 RX ORDER — PROPOFOL 10 MG/ML
INJECTION, EMULSION INTRAVENOUS PRN
Status: DISCONTINUED | OUTPATIENT
Start: 2020-04-08 | End: 2020-04-08 | Stop reason: SDUPTHER

## 2020-04-08 RX ORDER — PROMETHAZINE HYDROCHLORIDE 25 MG/ML
6.25 INJECTION, SOLUTION INTRAMUSCULAR; INTRAVENOUS
Status: DISCONTINUED | OUTPATIENT
Start: 2020-04-08 | End: 2020-04-08 | Stop reason: HOSPADM

## 2020-04-08 RX ORDER — ONDANSETRON 2 MG/ML
INJECTION INTRAMUSCULAR; INTRAVENOUS PRN
Status: DISCONTINUED | OUTPATIENT
Start: 2020-04-08 | End: 2020-04-08 | Stop reason: SDUPTHER

## 2020-04-08 RX ORDER — DIPHENHYDRAMINE HYDROCHLORIDE 50 MG/ML
12.5 INJECTION INTRAMUSCULAR; INTRAVENOUS
Status: DISCONTINUED | OUTPATIENT
Start: 2020-04-08 | End: 2020-04-08 | Stop reason: HOSPADM

## 2020-04-08 RX ORDER — LIDOCAINE HYDROCHLORIDE 10 MG/ML
INJECTION, SOLUTION EPIDURAL; INFILTRATION; INTRACAUDAL; PERINEURAL PRN
Status: DISCONTINUED | OUTPATIENT
Start: 2020-04-08 | End: 2020-04-08 | Stop reason: SDUPTHER

## 2020-04-08 RX ORDER — FENTANYL CITRATE 50 UG/ML
INJECTION, SOLUTION INTRAMUSCULAR; INTRAVENOUS PRN
Status: DISCONTINUED | OUTPATIENT
Start: 2020-04-08 | End: 2020-04-08 | Stop reason: SDUPTHER

## 2020-04-08 RX ORDER — MORPHINE SULFATE 4 MG/ML
4 INJECTION, SOLUTION INTRAMUSCULAR; INTRAVENOUS EVERY 5 MIN PRN
Status: DISCONTINUED | OUTPATIENT
Start: 2020-04-08 | End: 2020-04-08 | Stop reason: HOSPADM

## 2020-04-08 RX ORDER — LABETALOL 20 MG/4 ML (5 MG/ML) INTRAVENOUS SYRINGE
5 EVERY 10 MIN PRN
Status: DISCONTINUED | OUTPATIENT
Start: 2020-04-08 | End: 2020-04-08 | Stop reason: HOSPADM

## 2020-04-08 RX ORDER — ATROPA BELLADONNA AND OPIUM 16.2; 6 MG/1; MG/1
SUPPOSITORY RECTAL PRN
Status: DISCONTINUED | OUTPATIENT
Start: 2020-04-08 | End: 2020-04-08 | Stop reason: ALTCHOICE

## 2020-04-08 RX ORDER — HYDRALAZINE HYDROCHLORIDE 20 MG/ML
5 INJECTION INTRAMUSCULAR; INTRAVENOUS EVERY 10 MIN PRN
Status: DISCONTINUED | OUTPATIENT
Start: 2020-04-08 | End: 2020-04-08 | Stop reason: HOSPADM

## 2020-04-08 RX ORDER — HYDROMORPHONE HYDROCHLORIDE 1 MG/ML
0.25 INJECTION, SOLUTION INTRAMUSCULAR; INTRAVENOUS; SUBCUTANEOUS EVERY 5 MIN PRN
Status: DISCONTINUED | OUTPATIENT
Start: 2020-04-08 | End: 2020-04-08 | Stop reason: HOSPADM

## 2020-04-08 RX ORDER — ROCURONIUM BROMIDE 10 MG/ML
INJECTION, SOLUTION INTRAVENOUS PRN
Status: DISCONTINUED | OUTPATIENT
Start: 2020-04-08 | End: 2020-04-08 | Stop reason: SDUPTHER

## 2020-04-08 RX ORDER — SODIUM CHLORIDE 9 MG/ML
INJECTION, SOLUTION INTRAVENOUS CONTINUOUS
Status: DISCONTINUED | OUTPATIENT
Start: 2020-04-08 | End: 2020-04-08 | Stop reason: HOSPADM

## 2020-04-08 RX ORDER — MIDAZOLAM HYDROCHLORIDE 1 MG/ML
INJECTION INTRAMUSCULAR; INTRAVENOUS PRN
Status: DISCONTINUED | OUTPATIENT
Start: 2020-04-08 | End: 2020-04-08 | Stop reason: SDUPTHER

## 2020-04-08 RX ORDER — METOCLOPRAMIDE HYDROCHLORIDE 5 MG/ML
10 INJECTION INTRAMUSCULAR; INTRAVENOUS
Status: DISCONTINUED | OUTPATIENT
Start: 2020-04-08 | End: 2020-04-08 | Stop reason: HOSPADM

## 2020-04-08 RX ORDER — HYDROMORPHONE HYDROCHLORIDE 1 MG/ML
0.5 INJECTION, SOLUTION INTRAMUSCULAR; INTRAVENOUS; SUBCUTANEOUS EVERY 5 MIN PRN
Status: DISCONTINUED | OUTPATIENT
Start: 2020-04-08 | End: 2020-04-08 | Stop reason: HOSPADM

## 2020-04-08 RX ORDER — PHENAZOPYRIDINE HYDROCHLORIDE 100 MG/1
100 TABLET, FILM COATED ORAL ONCE
Status: COMPLETED | OUTPATIENT
Start: 2020-04-08 | End: 2020-04-08

## 2020-04-08 RX ORDER — OXYCODONE HYDROCHLORIDE AND ACETAMINOPHEN 5; 325 MG/1; MG/1
2 TABLET ORAL EVERY 4 HOURS PRN
Status: DISCONTINUED | OUTPATIENT
Start: 2020-04-08 | End: 2020-04-08 | Stop reason: HOSPADM

## 2020-04-08 RX ADMIN — MIDAZOLAM 2 MG: 1 INJECTION INTRAMUSCULAR; INTRAVENOUS at 13:47

## 2020-04-08 RX ADMIN — MEPERIDINE HYDROCHLORIDE 12.5 MG: 50 INJECTION INTRAMUSCULAR; INTRAVENOUS; SUBCUTANEOUS at 15:27

## 2020-04-08 RX ADMIN — DEXAMETHASONE SODIUM PHOSPHATE 10 MG: 10 INJECTION, SOLUTION INTRAMUSCULAR; INTRAVENOUS at 13:59

## 2020-04-08 RX ADMIN — CIPROFLOXACIN 400 MG: 2 INJECTION, SOLUTION INTRAVENOUS at 08:14

## 2020-04-08 RX ADMIN — SUGAMMADEX 200 MG: 100 INJECTION, SOLUTION INTRAVENOUS at 14:53

## 2020-04-08 RX ADMIN — PHENAZOPYRIDINE HYDROCHLORIDE 100 MG: 100 TABLET ORAL at 15:27

## 2020-04-08 RX ADMIN — FENTANYL CITRATE 50 MCG: 50 INJECTION INTRAMUSCULAR; INTRAVENOUS at 14:25

## 2020-04-08 RX ADMIN — SODIUM CHLORIDE, POTASSIUM CHLORIDE, SODIUM LACTATE AND CALCIUM CHLORIDE: 600; 310; 30; 20 INJECTION, SOLUTION INTRAVENOUS at 12:29

## 2020-04-08 RX ADMIN — ONDANSETRON HYDROCHLORIDE 4 MG: 2 INJECTION, SOLUTION INTRAMUSCULAR; INTRAVENOUS at 13:59

## 2020-04-08 RX ADMIN — SODIUM CHLORIDE: 9 INJECTION, SOLUTION INTRAVENOUS at 16:05

## 2020-04-08 RX ADMIN — LIDOCAINE HYDROCHLORIDE 50 MG: 10 INJECTION, SOLUTION EPIDURAL; INFILTRATION; INTRACAUDAL; PERINEURAL at 13:54

## 2020-04-08 RX ADMIN — ROCURONIUM BROMIDE 50 MG: 10 INJECTION, SOLUTION INTRAVENOUS at 13:54

## 2020-04-08 RX ADMIN — FENTANYL CITRATE 50 MCG: 50 INJECTION INTRAMUSCULAR; INTRAVENOUS at 13:54

## 2020-04-08 RX ADMIN — PROPOFOL 150 MG: 10 INJECTION, EMULSION INTRAVENOUS at 13:54

## 2020-04-08 RX ADMIN — TAMSULOSIN HYDROCHLORIDE 0.4 MG: 0.4 CAPSULE ORAL at 08:15

## 2020-04-08 RX ADMIN — OXYCODONE HYDROCHLORIDE AND ACETAMINOPHEN 2 TABLET: 5; 325 TABLET ORAL at 16:12

## 2020-04-08 ASSESSMENT — PAIN SCALES - GENERAL
PAINLEVEL_OUTOF10: 0
PAINLEVEL_OUTOF10: 0
PAINLEVEL_OUTOF10: 7
PAINLEVEL_OUTOF10: 6

## 2020-04-08 ASSESSMENT — PAIN DESCRIPTION - PAIN TYPE
TYPE: ACUTE PAIN
TYPE: ACUTE PAIN

## 2020-04-08 ASSESSMENT — ENCOUNTER SYMPTOMS
NAUSEA: 1
VOMITING: 0

## 2020-04-08 ASSESSMENT — PAIN - FUNCTIONAL ASSESSMENT: PAIN_FUNCTIONAL_ASSESSMENT: PREVENTS OR INTERFERES SOME ACTIVE ACTIVITIES AND ADLS

## 2020-04-08 ASSESSMENT — PAIN DESCRIPTION - FREQUENCY: FREQUENCY: CONTINUOUS

## 2020-04-08 ASSESSMENT — PAIN DESCRIPTION - DESCRIPTORS: DESCRIPTORS: ACHING;CRAMPING

## 2020-04-08 ASSESSMENT — PAIN DESCRIPTION - ONSET: ONSET: ON-GOING

## 2020-04-08 ASSESSMENT — PAIN DESCRIPTION - LOCATION
LOCATION: ABDOMEN
LOCATION: ABDOMEN

## 2020-04-08 ASSESSMENT — PAIN DESCRIPTION - ORIENTATION: ORIENTATION: LOWER

## 2020-04-08 ASSESSMENT — PAIN DESCRIPTION - PROGRESSION: CLINICAL_PROGRESSION: NOT CHANGED

## 2020-04-08 NOTE — DISCHARGE SUMMARY
[phone]   · ciprofloxacin 500 MG tablet     You can get these medications from any pharmacy    Bring a paper prescription for each of these medications  · oxyCODONE-acetaminophen 5-325 MG per tablet         Electronically signed by Donaldo Thornton MD on 4/8/20 at 5:10 PM CDT

## 2020-04-08 NOTE — ANESTHESIA PRE PROCEDURE
acetaminophen (TYLENOL) tablet 650 mg  650 mg Oral Q4H PRN Dominick Estrada MD        0.9 % sodium chloride infusion   Intravenous Continuous Dominick Estrada  mL/hr at 04/07/20 2128      HYDROmorphone HCl PF (DILAUDID) injection 0.5 mg  0.5 mg Intravenous Q3H PRN Dominick Estrada MD        ondansetron [facility]) injection 4 mg  4 mg Intravenous Q8H PRN Dominick Estrada MD        [facility] capsule 0.4 mg  0.4 mg Oral Daily Dominick Estrada MD   Stopped at 04/08/20 0815    HYDROmorphone HCl PF (DILAUDID) injection 0.5 mg  0.5 mg Intravenous Once Vielka Joseph, APRN           Allergies:     Allergies   Allergen Reactions    Keflex [Cephalexin]     Penicillins Anaphylaxis and Hives       Problem List:    Patient Active Problem List   Diagnosis Code    Hypertension I10    PIH (pregnancy induced hypertension), third trimester O13.3    NST (non-stress test) reactive Z36.89    Term pregnancy Z34.90    Lump or mass in breast N63.0    Diffuse cystic mastopathy N60.19    Kidney stone N20.0       Past Medical History:        Diagnosis Date    Acne     ADHD     Asthma     Breast disorder     Augmentation    Hypertension 7/27/2018    Hypoglycemia     Mental disorder     Anxiety and Depression    PCOS (polycystic ovarian syndrome)        Past Surgical History:        Procedure Laterality Date    BREAST BIOPSY Left     BREAST ENHANCEMENT SURGERY      WISDOM TOOTH EXTRACTION         Social History:    Social History     Tobacco Use    Smoking status: Never Smoker    Smokeless tobacco: Never Used   Substance Use Topics    Alcohol use: No     Alcohol/week: 0.0 standard drinks                                Counseling given: Not Answered      Vital Signs (Current):   Vitals:    04/07/20 2010 04/08/20 0244 04/08/20 0731 04/08/20 1054   BP:  136/76 123/80 119/74   Pulse: 88 92 80 81   Resp:  18 20 20   Temp:  97 °F (36.1 °C) 98 °F (36.7 °C) 98.4 °F (36.9 °C)   TempSrc:  Temporal

## 2020-04-08 NOTE — H&P
4.20 - 5.40 M/uL    Hemoglobin 14.1 12.0 - 16.0 g/dL    Hematocrit 43.0 37.0 - 47.0 %    MCV 84.8 81.0 - 99.0 fL    MCH 27.8 27.0 - 31.0 pg    MCHC 32.8 (L) 33.0 - 37.0 g/dL    RDW 12.4 11.5 - 14.5 %    Platelets 723 294 - 039 K/uL    MPV 9.2 (L) 9.4 - 12.3 fL    Neutrophils % 90.0 (H) 50.0 - 65.0 %    Lymphocytes % 5.8 (L) 20.0 - 40.0 %    Monocytes % 3.6 0.0 - 10.0 %    Eosinophils % 0.1 0.0 - 5.0 %    Basophils % 0.1 0.0 - 1.0 %    Neutrophils Absolute 14.8 (H) 1.5 - 7.5 K/uL    Immature Granulocytes # 0.1 K/uL    Lymphocytes Absolute 1.0 (L) 1.1 - 4.5 K/uL    Monocytes Absolute 0.60 0.00 - 0.90 K/uL    Eosinophils Absolute 0.00 0.00 - 0.60 K/uL    Basophils Absolute 0.00 0.00 - 0.20 K/uL   Comprehensive Metabolic Panel    Collection Time: 04/07/20  5:53 PM   Result Value Ref Range    Sodium 137 136 - 145 mmol/L    Potassium 4.5 3.5 - 5.0 mmol/L    Chloride 99 98 - 111 mmol/L    CO2 22 22 - 29 mmol/L    Anion Gap 16 7 - 19 mmol/L    Glucose 117 (H) 74 - 109 mg/dL    BUN 15 6 - 20 mg/dL    CREATININE 1.1 (H) 0.5 - 0.9 mg/dL    GFR Non- 59 (A) >60    Calcium 10.0 8.6 - 10.0 mg/dL    Total Protein 8.1 6.6 - 8.7 g/dL    Alb 4.7 3.5 - 5.2 g/dL    Total Bilirubin 0.4 0.2 - 1.2 mg/dL    Alkaline Phosphatase 59 35 - 104 U/L    ALT 34 (H) 5 - 33 U/L    AST 25 5 - 32 U/L   Urinalysis Reflex to Culture    Collection Time: 04/07/20  5:53 PM   Result Value Ref Range    Color, UA YELLOW Straw/Yellow    Clarity, UA Clear Clear    Glucose, Ur Negative Negative mg/dL    Bilirubin Urine Negative Negative    Ketones, Urine Negative Negative mg/dL    Specific Gravity, UA >1.045 1.005 - 1.030    Blood, Urine Negative Negative    pH, UA 6.5 5.0 - 8.0    Protein, UA TRACE (A) Negative mg/dL    Urobilinogen, Urine 0.2 <2.0 E.U./dL    Nitrite, Urine Negative Negative    Leukocyte Esterase, Urine Negative Negative    Urine Reflex to Culture Not Indicated         Imaging/Diagnostics Last 24 Hours   Ct Abdomen Pelvis W Wo

## 2020-04-08 NOTE — PROGRESS NOTES
Patient has remained pain free since approximately 0030. Denies discomfort this am . Is voiding without difficulty . Ambulates to bathroom with standby assist only .

## 2020-04-09 ENCOUNTER — CARE COORDINATION (OUTPATIENT)
Dept: CARE COORDINATION | Age: 30
End: 2020-04-09

## 2020-04-09 NOTE — OP NOTE
Operative Note      Patient: Shazia Mendez  YOB: 1990  MRN: [id number]    Date of Procedure: 4/8/2020    Pre-Op Diagnosis: 1. Right ureteral calculus 2. Right renal colic with intractable pain    Post-Op Diagnosis: Same       Procedure(s):  RIGHT URETEROSCOPY LASER LITHOTRIPSY  STENT PLACEMENT 5 [place] by 24 cm    Surgeon(s):  Sara Pennington MD    Assistant:   * No surgical staff found *    Anesthesia: General    Estimated Blood Loss (mL): 0 mL    Complications: None    Specimens:   ID Type Source Tests Collected by Time Destination   A : right ureteral calculus Stone (Calculus) Ureter SURGICAL PATHOLOGY, STONE ANALYSIS Sara Pennington MD 4/8/2020 1450        Implants:  Implant Name Type Inv. Item Serial No.  Lot No. LRB No. 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 Stone fragmented and multiple small fragments some of these were washed out into the bladder other should easily pass. 5 [place] by 24 cm double-J ureteral stent with string attached. Patient be instructed to remove the stent in approximately 5 to 7 days. She will need to follow-up with renal ultrasound in 6 weeks. Detailed Description of Procedure:   See dictated report  [id number]    Disposition patient was sent to recovery room in stable condition.   If her pain is reasonably controlled she will go home later today  Electronically signed by Patria Bell MD on 4/8/2020 at 2:56 PM
04/08/2020 16:03:53      T: 04/08/2020 16:40:30     [email or web address]  Job#: [id number]     Doc#: [id number]    CC:

## 2020-04-09 NOTE — ED PROVIDER NOTES
tenderness. There is no guarding or rebound. Hernia: No hernia is present. Neurological:      Mental Status: She is alert. Psychiatric:         Behavior: Behavior normal.         DIAGNOSTIC RESULTS     EKG: All EKG's are interpreted by the Emergency Department Physician who either signs or Co-signsthis chart in the absence of a cardiologist.        RADIOLOGY:   Marilyn Baars such as CT, Ultrasound and MRI are read by the radiologist. Plain radiographic images are visualized and preliminarily interpreted by the emergency physician with the below findings:      Interpretation per the Radiologist below, if available at the time of this note:    [address]   Final Result      US Renal Complete    (Results Pending)         ED BEDSIDEULTRASOUND:   Performed by ED Physician -none    LABS:  Labs Reviewed   CBC WITH AUTO DIFFERENTIAL - Abnormal; Notable for the following components:       Result Value    WBC 16.5 (*)     MCHC 32.8 (*)     MPV 9.2 (*)     Neutrophils % 90.0 (*)     Lymphocytes % 5.8 (*)     Neutrophils Absolute 14.8 (*)     Lymphocytes Absolute 1.0 (*)     All other components within normal limits   COMPREHENSIVE METABOLIC PANEL - Abnormal; Notable for the following components:    Glucose 117 (*)     CREATININE 1.1 (*)     GFR Non- 59 (*)     ALT 34 (*)     All other components within normal limits   URINE RT REFLEX TO CULTURE - Abnormal; Notable for the following components:    Protein, UA TRACE (*)     All other components within normal limits   STONE ANALYSIS   HCG, QUANTITATIVE, PREGNANCY   SURGICAL PATHOLOGY       All other labs were within normal range or not returned as of this dictation.     EMERGENCY DEPARTMENT COURSE and DIFFERENTIALDIAGNOSIS/MDM:   Vitals:    Vitals:    04/08/20 1510 04/08/20 1515 04/08/20 1545 04/08/20 1600   BP: (!) 112/54 114/67 108/70 119/79   Pulse: 85 80 75 79   Resp: 11 14 12 20   Temp:   97.6 °F (36.4 °C) 97.1 °F (36.2 °C) TempSrc:       SpO2: 91% 94% 97% 95%   Weight:               MDMspoke to Dr Betty Azevedo who agreed to admit the pt for procedure tommorrow      CONSULTS:  None    PROCEDURES:  Unless otherwise noted below, none     Procedures    FINAL IMPRESSION      1. Flank pain    2. Kidney stone    3. Right ureteral calculus    4. Renal colic on right side    5. Postoperative pain        DISPOSITION/PLAN   DISPOSITION Admitted 04/07/2020 07:14:08 PM      PATIENT REFERRED TO:  JOE Coley - STEVE  [facility] [address]  [phone]    Schedule an appointment as soon as possible for a visit in 1 week  Hospital follow up    Natan Garzon MD  [facility] [address] [phone]    Schedule an appointment as soon as possible for a visit in 6 weeks  with Renal Ultrasound prior to appt      DISCHARGE MEDICATIONS:  Discharge Medication List as of 4/8/2020  5:21 PM      START taking these medications    Details   oxyCODONE-acetaminophen (PERCOCET) 5-325 MG per tablet Take 1 tablet by mouth every 6 hours as needed for Pain for up to 3 days. , Disp-12 tablet, R-0Print      ciprofloxacin (CIPRO) 500 MG tablet Take 1 tablet by mouth 2 times daily for 3 days, Disp-6 tablet, R-0Normal                (Please note that portions of this note were completed with a voice recognitionprogram.  Efforts were made to edit the dictations but occasionally words are mis-transcribed.)    JOE Aguilar (electronically signed)          JOE Aguilar  04/08/20 1794

## 2020-04-11 LAB
CALCULI COMPOSITION: NORMAL
MASS: 3 MG
STONE DESCRIPTION: NORMAL
STONE NUMBER: 1
STONE SIZE: NORMAL MM

## 2020-04-13 ENCOUNTER — TELEPHONE (OUTPATIENT)
Dept: UROLOGY | Age: 30
End: 2020-04-13

## 2020-04-13 NOTE — TELEPHONE ENCOUNTER
Called patient left voicemail that she will need to take her stent out at home if she is able and we will see her in 6 week with a renal us.  If she is not able to take the stent out herself she will need to be put on Reed's neelamd tomro for stent removal.

## 2020-04-23 ENCOUNTER — CARE COORDINATION (OUTPATIENT)
Dept: CARE COORDINATION | Age: 30
End: 2020-04-23

## 2020-07-13 ENCOUNTER — HOSPITAL ENCOUNTER (OUTPATIENT)
Dept: ULTRASOUND IMAGING | Age: 30
Discharge: HOME | End: 2020-07-13
Payer: COMMERCIAL

## 2020-07-13 PROCEDURE — 76770 US EXAM ABDO BACK WALL COMP: CPT

## 2020-07-14 ENCOUNTER — HOSPITAL ENCOUNTER (OUTPATIENT)
Dept: ULTRASOUND IMAGING | Facility: HOSPITAL | Age: 30
Discharge: HOME | End: 2020-07-14
Admitting: NURSE PRACTITIONER

## 2020-07-14 ENCOUNTER — TRANSCRIBE ORDERS (OUTPATIENT)
Dept: ADMINISTRATIVE | Facility: HOSPITAL | Age: 30
End: 2020-07-14

## 2020-07-14 DIAGNOSIS — R22.9 LUMP OF SKIN: Primary | ICD-10-CM

## 2020-07-14 PROCEDURE — 76999 ECHO EXAMINATION PROCEDURE: CPT

## 2020-09-02 ENCOUNTER — TELEPHONE (OUTPATIENT)
Dept: OBGYN | Age: 30
End: 2020-09-02

## 2020-09-03 RX ORDER — MEDROXYPROGESTERONE ACETATE 10 MG/1
10 TABLET ORAL DAILY
Qty: 10 TABLET | Refills: 0 | Status: SHIPPED | OUTPATIENT
Start: 2020-09-03 | End: 2021-03-09

## 2020-09-03 NOTE — TELEPHONE ENCOUNTER
3 periods since the end of July. Pt has been fatigued and dizzy. Pt has lost 25 lb. Pt is not on OCP and would like to get pregnant. She has taken several HPT and they were negative. VV scheduled for next week for patient. She will come in and get lab work and start Provera. She can cancel if symptoms improve.

## 2020-09-04 DIAGNOSIS — N93.9 ABNORMAL UTERINE BLEEDING (AUB): ICD-10-CM

## 2020-09-04 LAB
BASOPHILS ABSOLUTE: 0 K/UL (ref 0–0.2)
BASOPHILS RELATIVE PERCENT: 0.3 % (ref 0–1)
EOSINOPHILS ABSOLUTE: 0.1 K/UL (ref 0–0.6)
EOSINOPHILS RELATIVE PERCENT: 1.9 % (ref 0–5)
FOLLICLE STIMULATING HORMONE: 5.5 MIU/ML
GONADOTROPIN, CHORIONIC (HCG) QUANT: 0.1 MIU/ML (ref 0–5.3)
HCT VFR BLD CALC: 42.6 % (ref 37–47)
HEMOGLOBIN: 14.2 G/DL (ref 12–16)
IMMATURE GRANULOCYTES #: 0 K/UL
LUTEINIZING HORMONE: 17.2 MIU/ML
LYMPHOCYTES ABSOLUTE: 1.9 K/UL (ref 1.1–4.5)
LYMPHOCYTES RELATIVE PERCENT: 24.8 % (ref 20–40)
MCH RBC QN AUTO: 28.1 PG (ref 27–31)
MCHC RBC AUTO-ENTMCNC: 33.3 G/DL (ref 33–37)
MCV RBC AUTO: 84.4 FL (ref 81–99)
MONOCYTES ABSOLUTE: 0.4 K/UL (ref 0–0.9)
MONOCYTES RELATIVE PERCENT: 5.6 % (ref 0–10)
NEUTROPHILS ABSOLUTE: 5.1 K/UL (ref 1.5–7.5)
NEUTROPHILS RELATIVE PERCENT: 67.1 % (ref 50–65)
PDW BLD-RTO: 12.7 % (ref 11.5–14.5)
PLATELET # BLD: 291 K/UL (ref 130–400)
PMV BLD AUTO: 9.4 FL (ref 9.4–12.3)
RBC # BLD: 5.05 M/UL (ref 4.2–5.4)
T4 FREE: 1.11 NG/DL (ref 0.93–1.7)
TSH SERPL DL<=0.05 MIU/L-ACNC: 1.27 UIU/ML (ref 0.27–4.2)
WBC # BLD: 7.6 K/UL (ref 4.8–10.8)

## 2020-09-10 ENCOUNTER — TELEMEDICINE (OUTPATIENT)
Dept: OBGYN | Age: 30
End: 2020-09-10
Payer: COMMERCIAL

## 2020-09-10 PROCEDURE — 99213 OFFICE O/P EST LOW 20 MIN: CPT | Performed by: NURSE PRACTITIONER

## 2020-09-10 ASSESSMENT — ENCOUNTER SYMPTOMS
EYES NEGATIVE: 1
CONSTIPATION: 0
ALLERGIC/IMMUNOLOGIC NEGATIVE: 1
GASTROINTESTINAL NEGATIVE: 1
DIARRHEA: 0
RESPIRATORY NEGATIVE: 1

## 2020-09-10 NOTE — PROGRESS NOTES
9/10/2020    TELEHEALTH EVALUATION -- Audio/Visual (During TNPFN-67 public health emergency)    HPI:    Coni Cesar (:  1990) has requested an audio/video evaluation for the following concern(s):    Pt c/o heavy, irregular periods for several months. Been also feeling very tired. Has lost about 25lbs, wanting to lose about 15lbs more prior to possible conception next year. Has history of PCOS and taking Metformin and Aldactone. Feels like she has about 10 days in between periods. Recent labs show abnormal FSH/LH ratio. Review of Systems   Constitutional: Positive for fatigue. HENT: Negative. Eyes: Negative. Respiratory: Negative. Cardiovascular: Negative. Gastrointestinal: Negative. Negative for constipation and diarrhea. Endocrine: Negative. Genitourinary: Positive for menstrual problem. Negative for frequency and urgency. Musculoskeletal: Negative. Skin: Negative. Allergic/Immunologic: Negative. Neurological: Negative. Hematological: Negative. Psychiatric/Behavioral: Negative. All other systems reviewed and are negative. Prior to Visit Medications    Medication Sig Taking?  Authorizing Provider   progesterone (PROMETRIUM) 200 MG capsule Take 1 capsule by mouth nightly Yes JOE Mensah CNP   medroxyPROGESTERone (PROVERA) 10 MG tablet Take 1 tablet by mouth daily for 10 days  JOE Mensah CNP   tamsulosin (FLOMAX) 0.4 MG capsule Take 1 capsule by mouth daily  [place] Nurse, MD   ondansetron (ZOFRAN ODT) 4 MG disintegrating tablet Take 1 tablet by mouth every 8 hours as needed for Nausea or Vomiting  Jonyhemluis a Reed PA-C   ketorolac (TORADOL) 10 MG tablet Take 1 tablet by mouth every 6 hours as needed for Pain  Jonyhemina MARKUS ReedC   Nutritional Supplements (VITAMIN D BOOSTER PO) Take by mouth  Historical Provider, MD   spironolactone (ALDACTONE) 50 MG tablet TAKE 1 TABLET BY MOUTH EVERY DAY  Patient taking differently: 100 mg   [place] JOE Villela - CNP   metFORMIN (GLUCOPHAGE) 1000 MG tablet TAKE 1 TABLET BY MOUTH TWICE A DAY WITH MEALS  Berna Cotton, JOE - CNP       Social History     Tobacco Use    Smoking status: Never Smoker    Smokeless tobacco: Never Used   Substance Use Topics    Alcohol use: No     Alcohol/week: 0.0 standard drinks    Drug use: No        Allergies   Allergen Reactions    Keflex [Cephalexin]     Penicillins Anaphylaxis and Hives   ,   Past Medical History:   Diagnosis Date    Acne     ADHD     Asthma     Breast disorder     Augmentation    Hypertension 7/27/2018    Hypoglycemia     Mental disorder     Anxiety and Depression    PCOS (polycystic ovarian syndrome)    ,   Past Surgical History:   Procedure Laterality Date    BREAST BIOPSY Left     BREAST ENHANCEMENT SURGERY      CYSTOSCOPY Right 4/8/2020    RIGHT URETEROSCOPY LASER LITHOTRIPSY performed by Antohny Xavier MD at [address] [place] [address] Drive Right 4/8/2020    STENT PLACEMENT performed by Anthony Xavier MD at [address]     ,   Social History     Tobacco Use    Smoking status: Never Smoker    Smokeless tobacco: Never Used   Substance Use Topics    Alcohol use: No     Alcohol/week: 0.0 standard drinks    Drug use: No       PHYSICAL EXAMINATION:  [ INSTRUCTIONS:  \"[x]\" Indicates a positive item  \"[]\" Indicates a negative item  -- DELETE ALL ITEMS NOT EXAMINED]  Vital Signs: (As obtained by patient/caregiver or practitioner observation)    Blood pressure-  Heart rate-    Respiratory rate-    Temperature-  Pulse oximetry-     Constitutional: [x] Appears well-developed and well-nourished [x] No apparent distress      [] Abnormal-   Mental status  [x] Alert and awake  [x] Oriented to person/place/time [x]Able to follow commands      Eyes:  EOM    [x]  Normal  [] Abnormal-  Sclera  [x]  Normal  [] Abnormal -         Discharge [x]  None visible  [] Abnormal -    HENT:   [x] Normocephalic, atraumatic.   [] Abnormal   [x] Mouth/Throat: Mucous membranes are moist.     External Ears [x] Normal  [] Abnormal-     Neck: [x] No visualized mass     Pulmonary/Chest: [x] Respiratory effort normal.  [] No visualized signs of difficulty breathing or respiratory distress        [] Abnormal-      Musculoskeletal:   [x] Normal gait with no signs of ataxia         [x] Normal range of motion of neck        [] Abnormal-       Neurological:        [x] No Facial Asymmetry (Cranial nerve 7 motor function) (limited exam to video visit)          [x] No gaze palsy        [] Abnormal-         Skin:        [x] No significant exanthematous lesions or discoloration noted on facial skin         [] Abnormal-            Psychiatric:       [x] Normal Affect [x] No Hallucinations        [] Abnormal-     Other pertinent observable physical exam findings-     ASSESSMENT/PLAN:  1. Irregular menses    2. Menorrhagia with irregular cycle    3. PCOS    Continue Metformin, Aldactone and weight loss efforts. Ordered TVUS. Start cyclic Prometrium. No follow-ups on file. Idania Tomas is a 34 y.o. female being evaluated by a Virtual Visit (video visit) encounter to address concerns as mentioned above. A caregiver was present when appropriate. Due to this being a TeleHealth encounter (During [facility]- public health emergency), evaluation of the following organ systems was limited: Vitals/Constitutional/EENT/Resp/CV/GI//MS/Neuro/Skin/Heme-Lymph-Imm. Pursuant to the emergency declaration under the [address] [place] [address], [address] [place] [address] authority and the [employer] system master and [employer] Act, this Virtual Visit was conducted with patient's (and/or legal guardian's) consent, to reduce the patient's risk of exposure to COVID-19 and provide necessary medical care.   The patient (and/or legal guardian) has also been advised to contact this office for worsening conditions or problems, and seek emergency medical treatment and/or call 911 if deemed necessary. Patient identification was verified at the start of the visit: Yes    Total time spent on this encounter: 15 mins    Services were provided through a video synchronous discussion virtually to substitute for in-person clinic visit. Patient and provider were located at their individual homes. --JOE Maya - CNP on 9/10/2020 at 4:52 PM    An electronic signature was used to authenticate this note.

## 2020-09-10 NOTE — PATIENT INSTRUCTIONS
Patient Education        Vaginal Bleeding in Nonpregnant Women: Care Instructions  Your Care Instructions     Many women have bleeding or spotting between periods. Lots of things can cause it. You may bleed because of hormone problems, stress, or ovulation. Fibroids and IUDs (intrauterine devices) can also cause bleeding. If your bleeding or spotting is caused by one of these things and is not heavy or doesn't happen often, you probably don't need to worry. But in rare cases, infection, cancer, or other serious conditions can cause bleeding. So you may need more tests to find the cause of your bleeding. The doctor has checked you carefully, but problems can develop later. If you notice any problems or new symptoms, get medical treatment right away. Follow-up care is a key part of your treatment and safety. Be sure to make and go to all appointments, and call your doctor if you are having problems. It's also a good idea to know your test results and keep a list of the medicines you take. How can you care for yourself at home? · Take pain medicines exactly as directed. ? If the doctor gave you a prescription medicine for pain, take it as prescribed. ? If you are not taking a prescription pain medicine, ask your doctor if you can take an over-the-counter medicine. Do not take aspirin, which may make bleeding worse. · If your doctor prescribed birth control pills for your bleeding, take them as directed. · Eat foods that are high in iron and vitamin C. Foods high in iron include red meat, shellfish, eggs, beans, and leafy green vegetables. Foods high in vitamin C include citrus fruits, tomatoes, and broccoli. Ask your doctor if you need to take iron pills or a multivitamin. · Ask your doctor when it is okay to have sex. When should you call for help? [id number] anytime you think you may need emergency care. For example, call if:  · You passed out (lost consciousness).   Call your doctor now or seek immediate

## 2020-09-17 ENCOUNTER — HOSPITAL ENCOUNTER (OUTPATIENT)
Dept: ULTRASOUND IMAGING | Age: 30
Discharge: HOME | End: 2020-09-17
Payer: COMMERCIAL

## 2020-09-17 ENCOUNTER — OFFICE VISIT (OUTPATIENT)
Dept: OBGYN | Age: 30
End: 2020-09-17
Payer: COMMERCIAL

## 2020-09-17 VITALS
WEIGHT: 201 LBS | HEART RATE: 115 BPM | SYSTOLIC BLOOD PRESSURE: 135 MMHG | BODY MASS INDEX: 35.61 KG/M2 | DIASTOLIC BLOOD PRESSURE: 85 MMHG | HEIGHT: 63 IN

## 2020-09-17 PROCEDURE — 76830 TRANSVAGINAL US NON-OB: CPT

## 2020-09-17 PROCEDURE — 99213 OFFICE O/P EST LOW 20 MIN: CPT | Performed by: NURSE PRACTITIONER

## 2020-09-17 ASSESSMENT — ENCOUNTER SYMPTOMS
GASTROINTESTINAL NEGATIVE: 1
CONSTIPATION: 0
DIARRHEA: 0
RESPIRATORY NEGATIVE: 1
ALLERGIC/IMMUNOLOGIC NEGATIVE: 1
EYES NEGATIVE: 1

## 2020-09-17 NOTE — PROGRESS NOTES
Idania Tomas is a 34 y.o. female who presents today for her medical conditions/ complaints as noted below. Idania Tomas is c/o of Menstrual Problem and Results        HPI  Pt presents for f/u on irregular bleeding and PCOS. Has had several episodes of periods every 2 weeks. Recently lost 25 lbs so unsure if this is associated with her wt loss. Would like to lose about 15 more lbs. Recent labs consistent with PCOS- abn FSH/LH ratio. Taking Metformin and Aldactone. Wanting to TTC sometime next year, really wanting to control her bleeding. Patient's last menstrual period was 09/13/2020 (exact date).   [id number]    Past Medical History:   Diagnosis Date    Acne     ADHD     Asthma     Breast disorder     Augmentation    Hypertension 7/27/2018    Hypoglycemia     Mental disorder     Anxiety and Depression    PCOS (polycystic ovarian syndrome)      Past Surgical History:   Procedure Laterality Date    BREAST BIOPSY Left     BREAST ENHANCEMENT SURGERY      CYSTOSCOPY Right 4/8/2020    RIGHT URETEROSCOPY LASER LITHOTRIPSY performed by Lewis Thomas MD at [facility] Right 4/8/2020    STENT PLACEMENT performed by Lewis [facility] MD at [facility]       Family History   Problem Relation Age of Onset    Breast Cancer Maternal Grandmother [facility]     Cancer Paternal Grandmother         Female-spread to other organs     Brain Cancer Other         Maternal Great Uncle      Social History     Tobacco Use    Smoking status: Never Smoker    Smokeless tobacco: Never Used   Substance Use Topics    Alcohol use: No     Alcohol/week: 0.0 standard drinks       Current Outpatient Medications   Medication Sig Dispense Refill    progesterone (PROMETRIUM) 200 MG capsule Take 1 capsule by mouth nightly 30 capsule 3    metFORMIN (GLUCOPHAGE) 1000 MG tablet TAKE 1 TABLET BY MOUTH TWICE A DAY WITH MEALS 60 tablet 11    spironolactone (ALDACTONE) 50 MG tablet Take 1 tablet by mouth daily 30 tablet 3    medroxyPROGESTERone (PROVERA) 10 MG tablet Take 1 tablet by mouth daily for 10 days 10 tablet 0    tamsulosin (FLOMAX) 0.4 MG capsule Take 1 capsule by mouth daily 10 capsule 0    ondansetron (ZOFRAN ODT) 4 MG disintegrating tablet Take 1 tablet by mouth every 8 hours as needed for Nausea or Vomiting 15 tablet 0    ketorolac (TORADOL) 10 MG tablet Take 1 tablet by mouth every 6 hours as needed for Pain 20 tablet 0    Nutritional Supplements (VITAMIN D BOOSTER PO) Take by mouth       No current facility-administered medications for this visit. Allergies   Allergen Reactions    Keflex [Cephalexin]     Penicillins Anaphylaxis and Hives     Vitals:    09/17/20 1547   BP: 135/85   Pulse: 115     Body mass index is 35.61 kg/m². Review of Systems   Constitutional: Negative. HENT: Negative. Eyes: Negative. Respiratory: Negative. Cardiovascular: Negative. Gastrointestinal: Negative. Negative for constipation and diarrhea. Endocrine: Negative. Genitourinary: Positive for menstrual problem. Negative for frequency and urgency. Musculoskeletal: Negative. Skin: Negative. Allergic/Immunologic: Negative. Neurological: Negative. Hematological: Negative. Psychiatric/Behavioral: Negative. All other systems reviewed and are negative. Physical Exam  Vitals signs and nursing note reviewed. Constitutional:       Appearance: She is well-developed. HENT:      Head: Normocephalic. Right Ear: External ear normal.      Left Ear: External ear normal.      Nose: Nose normal.   Neck:      Musculoskeletal: Normal range of motion. Musculoskeletal: Normal range of motion. Skin:     General: Skin is warm and dry. Neurological:      Mental Status: She is alert and oriented to person, place, and time.    Psychiatric:         Attention and Perception: Attention normal.         Mood and Affect: Mood normal.         Speech: Speech normal.         Behavior: Behavior normal.         Thought Content: Thought content normal.         Cognition and Memory: Cognition normal.         Judgment: Judgment normal.          Diagnosis Orders   1. Irregular menses     2. Abnormal uterine bleeding (AUB)     3. PCOS (polycystic ovarian syndrome)         MEDICATIONS:  No orders of the defined types were placed in this encounter. ORDERS:  No orders of the defined types were placed in this encounter. PLAN:  Start OCP x3 months- gave samples of Balcotra  Continue weight loss efforts  PNV when she stops OCP in 3 months    Patient Instructions   We are committed to providing you with the best care possible. In order to help us achieve these goals please remember to bring all medications, herbal products, and over the counter supplements with you to each visit. If your provider has ordered testing for you, please be sure to follow up with our office if you have not received results within 7 days after testing took place. *If you receive a survey after visiting one of our offices, please take the time to share your experience concerning your physician office visit. These surveys are confidential and no health information about you is shared. We are eager to improve for you and we are counting on your feedback to help make that happen. Patient Education        Polycystic Ovary Syndrome: Care Instructions  Your Care Instructions  Polycystic ovary syndrome, or PCOS, means a woman's hormones are out of balance. It can cause problems with your periods and make it hard to get pregnant. Doctors don't know for sure what causes PCOS, but it seems to run in families. It also seems to be linked to obesity and a risk for diabetes. If you have PCOS, your sisters and daughters have a higher chance of getting it too. You may have other symptoms. These include weight gain, acne, too much hair growth on the face or body, high blood pressure, and high blood sugar. Your ovaries may have cysts on them. These cysts are growths filled with fluid. Keep in mind that although you may not have regular periods, you can still get pregnant. Talk to your doctor about birth control if you do not want to get pregnant. Sometimes the hormone changes with PCOS can also make it hard for some women to get pregnant. If this is a concern, talk to your doctor about treatment for this problem. Women who have PCOS can go for months or longer with no period. Your doctor may recommend medicines that can help get your cycles back to normal.  Follow-up care is a key part of your treatment and safety. Be sure to make and go to all appointments, and call your doctor if you are having problems. It's also a good idea to know your test results and keep a list of the medicines you take. How can you care for yourself at home? · Take your medicines exactly as prescribed. Call your doctor if you think you are having a problem with your medicine. · Eat a healthy diet. Include fruits, vegetables, beans, and whole grains in your diet each day. · If you are overweight, losing weight can help with many of the symptoms of PCOS. Talk to your doctor about safe ways to lose weight. · Get at least 30 minutes of exercise on most days of the week. Walking is a good choice. Or you can run, swim, cycle, or play tennis or team sports. · For hair growth you don't want, try bleaching, plucking, electrolysis, or laser therapy. · Acne can be treated with over-the-counter medicines. Look for ones that have benzoyl peroxide or salicylic acid in them. When should you call for help? Call your doctor now or seek immediate medical care if:  · You have severe vaginal bleeding. · You have new or worse belly or pelvic pain. Watch closely for changes in your health, and be sure to contact your doctor if:  · You do not get better as expected. · You have unusual vaginal bleeding. Where can you learn more?   Go to

## 2020-09-17 NOTE — PATIENT INSTRUCTIONS
We are committed to providing you with the best care possible. In order to help us achieve these goals please remember to bring all medications, herbal products, and over the counter supplements with you to each visit. If your provider has ordered testing for you, please be sure to follow up with our office if you have not received results within 7 days after testing took place. *If you receive a survey after visiting one of our offices, please take the time to share your experience concerning your physician office visit. These surveys are confidential and no health information about you is shared. We are eager to improve for you and we are counting on your feedback to help make that happen. Patient Education        Polycystic Ovary Syndrome: Care Instructions  Your Care Instructions  Polycystic ovary syndrome, or PCOS, means a woman's hormones are out of balance. It can cause problems with your periods and make it hard to get pregnant. Doctors don't know for sure what causes PCOS, but it seems to run in families. It also seems to be linked to obesity and a risk for diabetes. If you have PCOS, your sisters and daughters have a higher chance of getting it too. You may have other symptoms. These include weight gain, acne, too much hair growth on the face or body, high blood pressure, and high blood sugar. Your ovaries may have cysts on them. These cysts are growths filled with fluid. Keep in mind that although you may not have regular periods, you can still get pregnant. Talk to your doctor about birth control if you do not want to get pregnant. Sometimes the hormone changes with PCOS can also make it hard for some women to get pregnant. If this is a concern, talk to your doctor about treatment for this problem. Women who have PCOS can go for months or longer with no period.  Your doctor may recommend medicines that can help get your cycles back to normal.  Follow-up care is a key part of your treatment and safety. Be sure to make and go to all appointments, and call your doctor if you are having problems. It's also a good idea to know your test results and keep a list of the medicines you take. How can you care for yourself at home? · Take your medicines exactly as prescribed. Call your doctor if you think you are having a problem with your medicine. · Eat a healthy diet. Include fruits, vegetables, beans, and whole grains in your diet each day. · If you are overweight, losing weight can help with many of the symptoms of PCOS. Talk to your doctor about safe ways to lose weight. · Get at least 30 minutes of exercise on most days of the week. Walking is a good choice. Or you can run, swim, cycle, or play tennis or team sports. · For hair growth you don't want, try bleaching, plucking, electrolysis, or laser therapy. · Acne can be treated with over-the-counter medicines. Look for ones that have benzoyl peroxide or salicylic acid in them. When should you call for help? Call your doctor now or seek immediate medical care if:  · You have severe vaginal bleeding. · You have new or worse belly or pelvic pain. Watch closely for changes in your health, and be sure to contact your doctor if:  · You do not get better as expected. · You have unusual vaginal bleeding. Where can you learn more? Go to [email or web address] org and sign in to your [employer] account. Enter Z265 in the [facility] box to learn more about \"Polycystic Ovary Syndrome: Care Instructions. \"     If you do not have an account, please click on the \"Sign Up Now\" link. Current as of: November 8, 2019               Content Version: 12.5  © 0033-8611 [employer]. Care instructions adapted under license by [facility] ([facility]).  If you have questions about a medical condition or this instruction, always ask your healthcare professional. [address] any warranty or liability for your use of this information.

## 2021-01-20 RX ORDER — SPIRONOLACTONE 50 MG/1
TABLET, FILM COATED ORAL
Qty: 30 TABLET | Refills: 3 | Status: SHIPPED | OUTPATIENT
Start: 2021-01-20 | End: 2021-01-28 | Stop reason: SDUPTHER

## 2021-01-28 RX ORDER — SPIRONOLACTONE 50 MG/1
50 TABLET, FILM COATED ORAL DAILY
Qty: 30 TABLET | Refills: 3 | Status: SHIPPED | OUTPATIENT
Start: 2021-01-28 | End: 2021-07-30

## 2021-03-05 DIAGNOSIS — R10.9 FLANK PAIN: Primary | ICD-10-CM

## 2021-03-05 DIAGNOSIS — N20.0 KIDNEY STONE: ICD-10-CM

## 2021-03-09 ENCOUNTER — HOSPITAL ENCOUNTER (OUTPATIENT)
Dept: GENERAL RADIOLOGY | Age: 31
Discharge: HOME | End: 2021-03-09
Payer: COMMERCIAL

## 2021-03-09 ENCOUNTER — OFFICE VISIT (OUTPATIENT)
Dept: UROLOGY | Age: 31
End: 2021-03-09
Payer: COMMERCIAL

## 2021-03-09 DIAGNOSIS — R10.9 FLANK PAIN: ICD-10-CM

## 2021-03-09 DIAGNOSIS — N20.0 RENAL CALCULUS, LEFT: ICD-10-CM

## 2021-03-09 DIAGNOSIS — R10.9 BILATERAL FLANK PAIN: Primary | ICD-10-CM

## 2021-03-09 DIAGNOSIS — N20.0 KIDNEY STONE: ICD-10-CM

## 2021-03-09 LAB
BACTERIA URINE, POC: ABNORMAL
BILIRUBIN URINE: 0 MG/DL
BLOOD, URINE: POSITIVE
CASTS URINE, POC: ABNORMAL
CLARITY: CLEAR
COLOR: YELLOW
CRYSTALS URINE, POC: ABNORMAL
EPI CELLS URINE, POC: ABNORMAL
GLUCOSE URINE: ABNORMAL
KETONES, URINE: NEGATIVE
LEUKOCYTE EST, POC: POSITIVE
NITRITE, URINE: NEGATIVE
PH UA: 5.5 (ref 4.5–8)
PROTEIN UA: NEGATIVE
RBC URINE, POC: 2
SPECIFIC GRAVITY UA: 1.02 (ref 1–1.03)
UROBILINOGEN, URINE: NORMAL
WBC URINE, POC: 3
YEAST URINE, POC: ABNORMAL

## 2021-03-09 PROCEDURE — 99214 OFFICE O/P EST MOD 30 MIN: CPT | Performed by: NURSE PRACTITIONER

## 2021-03-09 PROCEDURE — 81000 URINALYSIS NONAUTO W/SCOPE: CPT | Performed by: NURSE PRACTITIONER

## 2021-03-09 PROCEDURE — 74018 RADEX ABDOMEN 1 VIEW: CPT

## 2021-03-09 RX ORDER — TAMSULOSIN HYDROCHLORIDE 0.4 MG/1
0.4 CAPSULE ORAL DAILY
Qty: 30 CAPSULE | Refills: 1 | Status: SHIPPED | OUTPATIENT
Start: 2021-03-09 | End: 2021-03-22

## 2021-03-09 RX ORDER — PHENTERMINE HYDROCHLORIDE 15 MG/1
15 CAPSULE ORAL EVERY MORNING
COMMUNITY
End: 2021-03-12 | Stop reason: ALTCHOICE

## 2021-03-09 ASSESSMENT — ENCOUNTER SYMPTOMS
COUGH: 0
NAUSEA: 1
VOMITING: 0
WHEEZING: 0
BACK PAIN: 0
SORE THROAT: 0
EYE PAIN: 0

## 2021-03-09 NOTE — PROGRESS NOTES
[Cephalexin]     Penicillins Anaphylaxis and Hives       Social History     Socioeconomic History    Marital status:      Spouse name: None    Number of children: None    Years of education: None    Highest education level: None   Occupational History    None   Social Needs    Financial resource strain: None    Food insecurity     Worry: None     Inability: None    Transportation needs     Medical: None     Non-medical: None   Tobacco Use    Smoking status: Never Smoker    Smokeless tobacco: Never Used   Substance and Sexual Activity    Alcohol use: No     Alcohol/week: 0.0 standard drinks    Drug use: No    Sexual activity: Yes     Partners: Male   Lifestyle    Physical activity     Days per week: None     Minutes per session: None    Stress: None   Relationships    Social connections     Talks on phone: None     Gets together: None     Attends Restorationist service: None     Active member of club or organization: None     Attends meetings of clubs or organizations: None     Relationship status: None    Intimate partner violence     Fear of current or ex partner: None     Emotionally abused: None     Physically abused: None     Forced sexual activity: None   Other Topics Concern    None   Social History Narrative    None       Family History   Problem Relation Age of Onset    Breast Cancer Maternal Grandmother 36        Sudie Pluck     Cancer Paternal Grandmother         Female-spread to other organs     Brain Cancer Other         Maternal Great Uncle        REVIEW OF SYSTEMS:  Review of Systems   Constitutional: Negative for chills and fever. HENT: Negative for congestion and sore throat. Eyes: Negative for pain and visual disturbance. Respiratory: Negative for cough and wheezing. Cardiovascular: Negative for chest pain and palpitations. Gastrointestinal: Positive for nausea. Negative for vomiting. Endocrine: Negative for polyphagia and polyuria.    Genitourinary: Positive for flank pain (bilateral; prim on right) and pelvic pain. Negative for decreased urine volume, difficulty urinating, dyspareunia, dysuria, enuresis, frequency, genital sores, hematuria, menstrual problem, urgency, vaginal bleeding, vaginal discharge and vaginal pain. Musculoskeletal: Negative for back pain and neck pain. Skin: Negative for rash and wound. Allergic/Immunologic: Negative for environmental allergies and food allergies. Neurological: Negative for dizziness and headaches. Hematological: Negative for adenopathy. Does not bruise/bleed easily. Psychiatric/Behavioral: Negative for confusion and hallucinations. All other systems reviewed and are negative. PHYSICAL EXAM:  There were no vitals taken for this visit. Physical Exam  Constitutional:       General: She is not in acute distress. Appearance: Normal appearance. She is not toxic-appearing. HENT:      Head: Normocephalic. Neck:      Musculoskeletal: Normal range of motion. No neck rigidity. Cardiovascular:      Rate and Rhythm: Normal rate and regular rhythm. Pulmonary:      Effort: Pulmonary effort is normal. No respiratory distress. Breath sounds: No wheezing. Abdominal:      General: There is no distension. Palpations: Abdomen is soft. Tenderness: There is no abdominal tenderness. There is right CVA tenderness and left CVA tenderness. Musculoskeletal: Normal range of motion. Skin:     General: Skin is warm and dry. Neurological:      Mental Status: She is alert and oriented to person, place, and time. Sensory: Sensation is intact. Motor: No weakness.       Gait: Gait normal.   Psychiatric:         Mood and Affect: Mood and affect normal.         Behavior: Behavior normal.       DATA:    Results for orders placed or performed in visit on 03/09/21   POC URINE with Microscopic   Result Value Ref Range    Color, UA Yellow     Clarity, UA Clear Clear    Glucose, Ur neg     Bilirubin Urine 0 mg/dL    Ketones, Urine Negative     Specific Gravity, UA 1.025 1.005 - 1.030    Blood, Urine Positive (A)     pH, UA 5.5 4.5 - 8.0    Protein, UA Negative Negative    Nitrite, Urine Negative     Leukocytes, UA positive (A)     Urobilinogen, Urine Normal     rbc urine, poc 2 (A)     wbc urine, poc 3 (A)     bacteria urine, poc 2+ (A)     yeast urine, poc      casts urine, poc      epi cells urine, poc ++ (A)     crystals urine, poc         IMAGING:  I reviewed the KUB from today. Does indicate a small punctate stone in the ower pole of the left kidney. No ureteral stones identified. 1. Bilateral flank pain  Complains of bilateral flank pain. History of nephrolithiasis. KUB today indicates a left renal stone. Will obtain a CT for stone protocol to identify any stones that are unable to be seen on KUB. Will initiate medical expulsion therapy Flomax, increase fluids, strain urine.  - tamsulosin (FLOMAX) 0.4 MG capsule; Take 1 capsule by mouth daily  Dispense: 30 capsule; Refill: 1  - CT ABDOMEN PELVIS WO CONTRAST Additional Contrast? None; Future  - POC URINE with Microscopic    2. Renal calculus, left  Left renal stone identified on KUB. This is unchanged from previous scan. Discussed ESWL with risk and benefits. We will continue with CT for stone protocol to identify any additional stones or obstruction unable to be visualized on KUB. Orders Placed This Encounter   Procedures    CT ABDOMEN PELVIS WO CONTRAST Additional Contrast? None     For renal stone protocol     Standing Status:   Future     Standing Expiration Date:   3/9/2022     Scheduling Instructions: For renal stone protocol     Order Specific Question:   Additional Contrast?     Answer:   None     Order Specific Question:   Reason for exam:     Answer:   renal colic for stone protocol    POC URINE with Microscopic        Return in about 3 days (around 3/12/2021) for virtual visit, CT prior.     Abelino Mohs, APRN - CNP  All information inputted into the note by the MA to include chief complaint, past medical history, past surgical history, medications, allergies, social and family history and review of systems has been reviewed and updated as needed by me. EMR Dragon/transcription disclaimer: Much of this documentt is electronic  transcription/translation of spoken language to printed text. The  electronic translation of spoken language may be erroneous, or at times,  nonsensical words or phrases may be inadvertently transcribed.  Although I  have reviewed the document for such errors, some may still exist.

## 2021-03-10 ENCOUNTER — HOSPITAL ENCOUNTER (OUTPATIENT)
Dept: CT IMAGING | Age: 31
Discharge: HOME | End: 2021-03-10
Payer: COMMERCIAL

## 2021-03-10 DIAGNOSIS — R10.9 BILATERAL FLANK PAIN: ICD-10-CM

## 2021-03-10 PROCEDURE — 74176 CT ABD & PELVIS W/O CONTRAST: CPT

## 2021-03-12 ENCOUNTER — PREP FOR PROCEDURE (OUTPATIENT)
Dept: UROLOGY | Age: 31
End: 2021-03-12

## 2021-03-12 ENCOUNTER — TELEMEDICINE (OUTPATIENT)
Dept: UROLOGY | Age: 31
End: 2021-03-12
Payer: COMMERCIAL

## 2021-03-12 DIAGNOSIS — N20.0 RENAL CALCULUS, LEFT: Primary | ICD-10-CM

## 2021-03-12 PROCEDURE — 99213 OFFICE O/P EST LOW 20 MIN: CPT | Performed by: NURSE PRACTITIONER

## 2021-03-12 RX ORDER — UBROGEPANT 100 MG/1
TABLET ORAL
COMMUNITY
Start: 2021-03-01 | End: 2021-07-30

## 2021-03-12 RX ORDER — SULFAMETHOXAZOLE AND TRIMETHOPRIM 800; 160 MG/1; MG/1
TABLET ORAL
COMMUNITY
Start: 2021-03-02 | End: 2021-03-22

## 2021-03-12 RX ORDER — PHENTERMINE HYDROCHLORIDE 37.5 MG/1
TABLET ORAL
COMMUNITY
Start: 2021-02-11 | End: 2021-07-30

## 2021-03-12 ASSESSMENT — ENCOUNTER SYMPTOMS
ABDOMINAL DISTENTION: 0
ABDOMINAL PAIN: 0
CHEST TIGHTNESS: 0
VOMITING: 0
SHORTNESS OF BREATH: 0
NAUSEA: 1
ABDOMINAL DISTENTION: 0
COLOR CHANGE: 0
CHEST TIGHTNESS: 0
VOMITING: 0
CONSTIPATION: 0
SHORTNESS OF BREATH: 0
CONSTIPATION: 0
COLOR CHANGE: 0
NAUSEA: 1
ABDOMINAL PAIN: 0

## 2021-03-12 NOTE — H&P (VIEW-ONLY)
3/12/2021    TELEHEALTH EVALUATION -- Audio/Visual (During [id number] public health emergency)    HPI:  Patient presents to virtual visit for follow-up on CT flank pain. She was seen in the office on 3/8/2021 for evaluation of bilateral flank pain that started approximately 2 weeks ago. She denies any fever, chills, vomiting, dysuria, hematuria. She has experienced some nausea. She was treated for UTI approximately 2 weeks ago and has taken Pyridium. She does have some achiness and pain in her urethra. Obtained a CT which indicates a left lower pole stone, nonobstructing, no ureteral stones seen. She states her pain has gone up to 8/10 2 days ago the left flank that radiates to left groin. KUB obtained prior to last appointment indicates a left lower pole stone. She is going to [place] the end of April and like to explore surgical options. Shilo Baldwin (:  1990) has requested an audio/video evaluation for the following concern(s):  F/U flank pain CT done. Review of Systems   Constitutional: Negative for activity change, appetite change, chills, fatigue, fever and unexpected weight change. HENT: Negative for congestion and hearing loss. Respiratory: Negative for chest tightness and shortness of breath. Cardiovascular: Negative for chest pain. Gastrointestinal: Positive for nausea. Negative for abdominal distention, abdominal pain, constipation and vomiting. Genitourinary: Positive for dysuria and flank pain. Negative for decreased urine volume, difficulty urinating, frequency, hematuria, pelvic pain and urgency. Musculoskeletal: Negative for arthralgias and gait problem. Skin: Negative for color change. Neurological: Negative for dizziness, syncope, weakness, numbness and headaches. Psychiatric/Behavioral: Negative for behavioral problems and confusion. The patient is not nervous/anxious. Prior to Visit Medications    Medication Sig Taking?  Authorizing Provider phentermine (ADIPEX-P) 37.5 MG tablet TAKE 1 TABLET BY MOUTH EVERY DAY  Historical Provider, MD   sulfamethoxazole-trimethoprim (BACTRIM DS;SEPTRA DS) 800-160 MG per tablet TAKE 1 TABLET BY MOUTH TWICE A DAY FOR 5 DAYS  Historical Provider, MD   UBRELVY 100 MG TABS TAKE 1 TABLET BY MOUTH. MAY REPEAT 2 HOURS AFTER FIRST DOSE AS NEEDED.   Historical Provider, MD   tamsulosin (FLOMAX) 0.4 MG capsule Take 1 capsule by mouth daily  JOE Hartman CNP   spironolactone (ALDACTONE) 50 MG tablet Take 1 tablet by mouth daily  JOE Grover CNP   metFORMIN (GLUCOPHAGE) 1000 MG tablet Take 1 tablet by mouth 2 times daily (with meals)  JOE Grover CNP       Social History     Tobacco Use    Smoking status: Never Smoker    Smokeless tobacco: Never Used   Substance Use Topics    Alcohol use: No     Alcohol/week: 0.0 standard drinks    Drug use: No        Allergies   Allergen Reactions    Keflex [Cephalexin]     Penicillins Anaphylaxis and Hives   ,   Past Medical History:   Diagnosis Date    Acne     ADHD     Asthma     Breast disorder     Augmentation    Hypertension 7/27/2018    Hypoglycemia     Mental disorder     Anxiety and Depression    PCOS (polycystic ovarian syndrome)    ,   Past Surgical History:   Procedure Laterality Date    BREAST BIOPSY Left     BREAST ENHANCEMENT SURGERY      CYSTOSCOPY Right 4/8/2020    RIGHT URETEROSCOPY LASER LITHOTRIPSY performed by Murali Ashley MD at [facility] Right 4/8/2020    STENT PLACEMENT performed by Murali Ashley MD at [place] [id number] EXTRACTION     ,   Social History     Tobacco Use    Smoking status: Never Smoker    Smokeless tobacco: Never Used   Substance Use Topics    Alcohol use: No     Alcohol/week: 0.0 standard drinks    Drug use: No   ,   Family History   Problem Relation Age of Onset    Breast Cancer Maternal Grandmother [address] [facility]     Cancer Paternal Grandmother         Female-spread to other organs     Brain Cancer Other         Maternal Great Uncle    , There is no immunization history for the selected administration types on file for this patient.,   Health Maintenance   Topic Date Due    Hepatitis C screen  Never done    Varicella vaccine (1 of 2 - 2-dose childhood series) Never done    COVID-19 Vaccine (1) Never done    Potassium monitoring  04/07/2021    Creatinine monitoring  04/07/2021    Cervical cancer screen  02/07/2025    DTaP/Tdap/Td vaccine (2 - Td) 07/24/2028    Flu vaccine  Completed    HIV screen  Completed    Hepatitis A vaccine  Aged Out    Hepatitis B vaccine  Aged Out    Hib vaccine  Aged Out    Meningococcal (ACWY) vaccine  Aged Out    Pneumococcal 0-64 years Vaccine  Aged Out       PHYSICAL EXAMINATION:  [ INSTRUCTIONS:  \"[x]\" Indicates a positive item  \"[]\" Indicates a negative item  -- DELETE ALL ITEMS NOT EXAMINED]  Vital Signs: (As obtained by patient/caregiver or practitioner observation)    Blood pressure-  Heart rate-    Respiratory rate-    Temperature-  Pulse oximetry-     Constitutional: [x] Appears well-developed and well-nourished [] No apparent distress      [] Abnormal-   Mental status  [x] Alert and awake  [] Oriented to person/place/time []Able to follow commands      Eyes:  EOM    [x]  Normal  [] Abnormal-  Sclera  [x]  Normal  [] Abnormal -         Discharge []  None visible  [] Abnormal -    HENT:   [x] Normocephalic, atraumatic.   [] Abnormal   [] Mouth/Throat: Mucous membranes are moist.     External Ears [x] Normal  [] Abnormal-     Neck: [x] No visualized mass     Pulmonary/Chest: [x] Respiratory effort normal.  [] No visualized signs of difficulty breathing or respiratory distress        [] Abnormal-      Musculoskeletal:   [x] Normal gait with no signs of ataxia         [] Normal range of motion of neck        [] Abnormal-       Neurological:        [x] No Facial Asymmetry (Cranial nerve 7 motor function) (limited exam to video visit) [] No gaze palsy        [] Abnormal-         Skin:        [x] No significant exanthematous lesions or discoloration noted on facial skin         [] Abnormal-            Psychiatric:       [x] Normal Affect [] No Hallucinations        [] Abnormal-     Other pertinent observable physical exam findings-     ASSESSMENT/PLAN:  1. Renal calculus, left  CT and KUB indicate a left lower pole nonobstructing calculus. Patient going out-of-town at the end of April would like surgical intervention. Based on the stone location and severity of symptoms the patient has elected to proceed with Left renal ESWL. We have discussed other modalities of treatment, including but not limited to a trial of medical expulsion therapy, ureteroscopy with laser lithotripsy, and monitoring the stone with subsequent imaging. Risks of the procedure were discussed which include but are not limited to bleeding, infection, postprocedural pain, damage to the kidney or surrounding organs, incomplete stone fragmentation or inability to pass stone fragments possibly causing urinary obstruction and resulting in need for additional/subsequent procedures or ureteral stenting. The patient has been provided with preoperative arrival times/lab work and has been instructed to strain urine prior to and after the procedure to collect stone fragments to send for analysis. No follow-ups on file. Will schedule for surgery     Gina Spring, was evaluated through a synchronous (real-time) audio-video encounter. The patient (or guardian if applicable) is aware that this is a billable service. Verbal consent to proceed has been obtained within the past 12 months. The visit was conducted pursuant to the emergency declaration under the [facility], [address] [place] [address] and the [employer] Act.   Patient identification was verified, and a caregiver was present when appropriate. The patient was located in a state where the provider was credentialed to provide care. Total time spent on this encounter: 25 minutes    --JOE Reddy CNP on 3/12/2021 at 3:18 PM    An electronic signature was used to authenticate this note.

## 2021-03-12 NOTE — H&P
3/12/2021    TELEHEALTH EVALUATION -- Audio/Visual (During UAGSX-78 public health emergency)    HPI:  Patient presents to virtual visit for follow-up on CT flank pain. She was seen in the office on 3/8/2021 for evaluation of bilateral flank pain that started approximately 2 weeks ago. She denies any fever, chills, vomiting, dysuria, hematuria. She has experienced some nausea. She was treated for UTI approximately 2 weeks ago and has taken Pyridium. She does have some achiness and pain in her urethra. Obtained a CT which indicates a left lower pole stone, nonobstructing, no ureteral stones seen. She states her pain has gone up to 8/10 2 days ago the left flank that radiates to left groin. KUB obtained prior to last appointment indicates a left lower pole stone. She is going to [place] the end of April and like to explore surgical options. Sagar Murcian (:  1990) has requested an audio/video evaluation for the following concern(s):  F/U flank pain CT done. Review of Systems   Constitutional: Negative for activity change, appetite change, chills, fatigue, fever and unexpected weight change. HENT: Negative for congestion and hearing loss. Respiratory: Negative for chest tightness and shortness of breath. Cardiovascular: Negative for chest pain. Gastrointestinal: Positive for nausea. Negative for abdominal distention, abdominal pain, constipation and vomiting. Genitourinary: Positive for dysuria and flank pain. Negative for decreased urine volume, difficulty urinating, frequency, hematuria, pelvic pain and urgency. Musculoskeletal: Negative for arthralgias and gait problem. Skin: Negative for color change. Neurological: Negative for dizziness, syncope, weakness, numbness and headaches. Psychiatric/Behavioral: Negative for behavioral problems and confusion. The patient is not nervous/anxious. Prior to Visit Medications    Medication Sig Taking?  Authorizing Provider phentermine (ADIPEX-P) 37.5 MG tablet TAKE 1 TABLET BY MOUTH EVERY DAY  Historical Provider, MD   sulfamethoxazole-trimethoprim (BACTRIM DS;SEPTRA DS) 800-160 MG per tablet TAKE 1 TABLET BY MOUTH TWICE A DAY FOR 5 DAYS  Historical Provider, MD   UBRELVY 100 MG TABS TAKE 1 TABLET BY MOUTH. MAY REPEAT 2 HOURS AFTER FIRST DOSE AS NEEDED.   Historical Provider, MD   tamsulosin (FLOMAX) 0.4 MG capsule Take 1 capsule by mouth daily  JOE Mccallum CNP   spironolactone (ALDACTONE) 50 MG tablet Take 1 tablet by mouth daily  JOE Garcia CNP   metFORMIN (GLUCOPHAGE) 1000 MG tablet Take 1 tablet by mouth 2 times daily (with meals)  JOE Garcia CNP       Social History     Tobacco Use    Smoking status: Never Smoker    Smokeless tobacco: Never Used   Substance Use Topics    Alcohol use: No     Alcohol/week: 0.0 standard drinks    Drug use: No        Allergies   Allergen Reactions    Keflex [Cephalexin]     Penicillins Anaphylaxis and Hives   ,   Past Medical History:   Diagnosis Date    Acne     ADHD     Asthma     Breast disorder     Augmentation    Hypertension 7/27/2018    Hypoglycemia     Mental disorder     Anxiety and Depression    PCOS (polycystic ovarian syndrome)    ,   Past Surgical History:   Procedure Laterality Date    BREAST BIOPSY Left     BREAST ENHANCEMENT SURGERY      CYSTOSCOPY Right 4/8/2020    RIGHT URETEROSCOPY LASER LITHOTRIPSY performed [facility]a [facility] at [facility] Right 4/8/2020    STENT PLACEMENT performed by [facility]a [facility]ison MD at [facility]     ,   Social History     Tobacco Use    Smoking status: Never Smoker    Smokeless tobacco: Never Used   Substance Use Topics    Alcohol use: No     Alcohol/week: 0.0 standard drinks    Drug use: No   ,   Family History   Problem Relation Age of Onset    Breast Cancer Maternal Grandmother [address] [facility]     Cancer Paternal Grandmother         Female-spread to other organs     Brain Cancer Other         Maternal Great Uncle    , There is no immunization history for the selected administration types on file for this patient.,   Health Maintenance   Topic Date Due    Hepatitis C screen  Never done    Varicella vaccine (1 of 2 - 2-dose childhood series) Never done    COVID-19 Vaccine (1) Never done    Potassium monitoring  04/07/2021    Creatinine monitoring  04/07/2021    Cervical cancer screen  02/07/2025    DTaP/Tdap/Td vaccine (2 - Td) 07/24/2028    Flu vaccine  Completed    HIV screen  Completed    Hepatitis A vaccine  Aged Out    Hepatitis B vaccine  Aged Out    Hib vaccine  Aged Out    Meningococcal (ACWY) vaccine  Aged Out    Pneumococcal 0-64 years Vaccine  Aged Out       PHYSICAL EXAMINATION:  [ INSTRUCTIONS:  \"[x]\" Indicates a positive item  \"[]\" Indicates a negative item  -- DELETE ALL ITEMS NOT EXAMINED]  Vital Signs: (As obtained by patient/caregiver or practitioner observation)    Blood pressure-  Heart rate-    Respiratory rate-    Temperature-  Pulse oximetry-     Constitutional: [x] Appears well-developed and well-nourished [] No apparent distress      [] Abnormal-   Mental status  [x] Alert and awake  [] Oriented to person/place/time []Able to follow commands      Eyes:  EOM    [x]  Normal  [] Abnormal-  Sclera  [x]  Normal  [] Abnormal -         Discharge []  None visible  [] Abnormal -    HENT:   [x] Normocephalic, atraumatic.   [] Abnormal   [] Mouth/Throat: Mucous membranes are moist.     External Ears [x] Normal  [] Abnormal-     Neck: [x] No visualized mass     Pulmonary/Chest: [x] Respiratory effort normal.  [] No visualized signs of difficulty breathing or respiratory distress        [] Abnormal-      Musculoskeletal:   [x] Normal gait with no signs of ataxia         [] Normal range of motion of neck        [] Abnormal-       Neurological:        [x] No Facial Asymmetry (Cranial nerve 7 motor function) (limited exam to video visit) [] No gaze palsy        [] Abnormal-         Skin:        [x] No significant exanthematous lesions or discoloration noted on facial skin         [] Abnormal-            Psychiatric:       [x] Normal Affect [] No Hallucinations        [] Abnormal-     Other pertinent observable physical exam findings-     ASSESSMENT/PLAN:  1. Renal calculus, left  CT and KUB indicate a left lower pole nonobstructing calculus. Patient going out-of-town at the end of April would like surgical intervention. Based on the stone location and severity of symptoms the patient has elected to proceed with Left renal ESWL. We have discussed other modalities of treatment, including but not limited to a trial of medical expulsion therapy, ureteroscopy with laser lithotripsy, and monitoring the stone with subsequent imaging. Risks of the procedure were discussed which include but are not limited to bleeding, infection, postprocedural pain, damage to the kidney or surrounding organs, incomplete stone fragmentation or inability to pass stone fragments possibly causing urinary obstruction and resulting in need for additional/subsequent procedures or ureteral stenting. The patient has been provided with preoperative arrival times/lab work and has been instructed to strain urine prior to and after the procedure to collect stone fragments to send for analysis. No follow-ups on file. Will schedule for surgery     Garcia Mike, was evaluated through a synchronous (real-time) audio-video encounter. The patient (or guardian if applicable) is aware that this is a billable service. Verbal consent to proceed has been obtained within the past 12 months. The visit was conducted pursuant to the emergency declaration under the [facility], [address] [place] [address] authority and the [employer] Act.   Patient identification was verified, and a caregiver was present when appropriate. The patient was located in a state where the provider was credentialed to provide care. Total time spent on this encounter: 25 minutes    --JOE Andino CNP on 3/12/2021 at 3:18 PM    An electronic signature was used to authenticate this note.

## 2021-03-12 NOTE — PROGRESS NOTES
3/12/2021    TELEHEALTH EVALUATION -- Audio/Visual (During [id number] public health emergency)    HPI:  Patient presents to virtual visit for follow-up on CT flank pain. She was seen in the office on 3/8/2021 for evaluation of bilateral flank pain that started approximately 2 weeks ago. She denies any fever, chills, vomiting, dysuria, hematuria. She has experienced some nausea. She was treated for UTI approximately 2 weeks ago and has taken Pyridium. She does have some achiness and pain in her urethra. Obtained a CT which indicates a left lower pole stone, nonobstructing, no ureteral stones seen. She states her pain has gone up to 8/10 2 days ago the left flank that radiates to left groin. KUB obtained prior to last appointment indicates a left lower pole stone. She is going to [place] the end of April and like to explore surgical options. Johana Barnard (:  1990) has requested an audio/video evaluation for the following concern(s):  F/U flank pain CT done. Review of Systems   Constitutional: Negative for activity change, appetite change, chills, fatigue, fever and unexpected weight change. HENT: Negative for congestion and hearing loss. Respiratory: Negative for chest tightness and shortness of breath. Cardiovascular: Negative for chest pain. Gastrointestinal: Positive for nausea. Negative for abdominal distention, abdominal pain, constipation and vomiting. Genitourinary: Positive for dysuria and flank pain. Negative for decreased urine volume, difficulty urinating, frequency, hematuria, pelvic pain and urgency. Musculoskeletal: Negative for arthralgias and gait problem. Skin: Negative for color change. Neurological: Negative for dizziness, syncope, weakness, numbness and headaches. Psychiatric/Behavioral: Negative for behavioral problems and confusion. The patient is not nervous/anxious. Prior to Visit Medications    Medication Sig Taking?  Authorizing Provider phentermine (ADIPEX-P) 37.5 MG tablet TAKE 1 TABLET BY MOUTH EVERY DAY  Historical Provider, MD   sulfamethoxazole-trimethoprim (BACTRIM DS;SEPTRA DS) 800-160 MG per tablet TAKE 1 TABLET BY MOUTH TWICE A DAY FOR 5 DAYS  Historical Provider, MD   UBRELVY 100 MG TABS TAKE 1 TABLET BY MOUTH. MAY REPEAT 2 HOURS AFTER FIRST DOSE AS NEEDED.   Historical Provider, MD   tamsulosin (FLOMAX) 0.4 MG capsule Take 1 capsule by mouth daily  JOE Lerma CNP   spironolactone (ALDACTONE) 50 MG tablet Take 1 tablet by mouth daily  JOE Sutton CNP   metFORMIN (GLUCOPHAGE) 1000 MG tablet Take 1 tablet by mouth 2 times daily (with meals)  JOE Sutton CNP       Social History     Tobacco Use    Smoking status: Never Smoker    Smokeless tobacco: Never Used   Substance Use Topics    Alcohol use: No     Alcohol/week: 0.0 standard drinks    Drug use: No        Allergies   Allergen Reactions    Keflex [Cephalexin]     Penicillins Anaphylaxis and Hives   ,   Past Medical History:   Diagnosis Date    Acne     ADHD     Asthma     Breast disorder     Augmentation    Hypertension 7/27/2018    Hypoglycemia     Mental disorder     Anxiety and Depression    PCOS (polycystic ovarian syndrome)    ,   Past Surgical History:   Procedure Laterality Date    BREAST BIOPSY Left     BREAST ENHANCEMENT SURGERY      CYSTOSCOPY Right 4/8/2020    RIGHT URETEROSCOPY LASER LITHOTRIPSY performed by Mark [facility]ce [facility] Right 4/8/2020    STENT PLACEMENT performed by Mark Pearce MD at [place] [address] EXTRACTION     ,   Social History     Tobacco Use    Smoking status: Never Smoker    Smokeless tobacco: Never Used   Substance Use Topics    Alcohol use: No     Alcohol/week: 0.0 standard drinks    Drug use: No   ,   Family History   Problem Relation Age of Onset    Breast Cancer Maternal Grandmother [facility]     Cancer Paternal Grandmother         Female-spread to other organs     Brain Cancer Other         Maternal Great Uncle    , There is no immunization history for the selected administration types on file for this patient.,   Health Maintenance   Topic Date Due    Hepatitis C screen  Never done    Varicella vaccine (1 of 2 - 2-dose childhood series) Never done    COVID-19 Vaccine (1) Never done    Potassium monitoring  04/07/2021    Creatinine monitoring  04/07/2021    Cervical cancer screen  02/07/2025    DTaP/Tdap/Td vaccine (2 - Td) 07/24/2028    Flu vaccine  Completed    HIV screen  Completed    Hepatitis A vaccine  Aged Out    Hepatitis B vaccine  Aged Out    Hib vaccine  Aged Out    Meningococcal (ACWY) vaccine  Aged Out    Pneumococcal 0-64 years Vaccine  Aged Out       PHYSICAL EXAMINATION:  [ INSTRUCTIONS:  \"[x]\" Indicates a positive item  \"[]\" Indicates a negative item  -- DELETE ALL ITEMS NOT EXAMINED]  Vital Signs: (As obtained by patient/caregiver or practitioner observation)    Blood pressure-  Heart rate-    Respiratory rate-    Temperature-  Pulse oximetry-     Constitutional: [x] Appears well-developed and well-nourished [] No apparent distress      [] Abnormal-   Mental status  [x] Alert and awake  [] Oriented to person/place/time []Able to follow commands      Eyes:  EOM    [x]  Normal  [] Abnormal-  Sclera  [x]  Normal  [] Abnormal -         Discharge []  None visible  [] Abnormal -    HENT:   [x] Normocephalic, atraumatic.   [] Abnormal   [] Mouth/Throat: Mucous membranes are moist.     External Ears [x] Normal  [] Abnormal-     Neck: [x] No visualized mass     Pulmonary/Chest: [x] Respiratory effort normal.  [] No visualized signs of difficulty breathing or respiratory distress        [] Abnormal-      Musculoskeletal:   [x] Normal gait with no signs of ataxia         [] Normal range of motion of neck        [] Abnormal-       Neurological:        [x] No Facial Asymmetry (Cranial nerve 7 motor function) (limited exam to video visit) [] No gaze palsy        [] Abnormal-         Skin:        [x] No significant exanthematous lesions or discoloration noted on facial skin         [] Abnormal-            Psychiatric:       [x] Normal Affect [] No Hallucinations        [] Abnormal-     Other pertinent observable physical exam findings-     ASSESSMENT/PLAN:  1. Renal calculus, left  CT and KUB indicate a left lower pole nonobstructing calculus. Patient going out-of-town at the end of April would like surgical intervention. Based on the stone location and severity of symptoms the patient has elected to proceed with Left renal ESWL. We have discussed other modalities of treatment, including but not limited to a trial of medical expulsion therapy, ureteroscopy with laser lithotripsy, and monitoring the stone with subsequent imaging. Risks of the procedure were discussed which include but are not limited to bleeding, infection, postprocedural pain, damage to the kidney or surrounding organs, incomplete stone fragmentation or inability to pass stone fragments possibly causing urinary obstruction and resulting in need for additional/subsequent procedures or ureteral stenting. The patient has been provided with preoperative arrival times/lab work and has been instructed to strain urine prior to and after the procedure to collect stone fragments to send for analysis. No follow-ups on file. Will schedule for surgery     Brenden Mendoza, was evaluated through a synchronous (real-time) audio-video encounter. The patient (or guardian if applicable) is aware that this is a billable service. Verbal consent to proceed has been obtained within the past 12 months. The visit was conducted pursuant to the emergency declaration under the [facility], [address] [place] [address] and the [employer] and [employer] General Act.   Patient identification was verified, and a caregiver was present when appropriate. The patient was located in a state where the provider was credentialed to provide care. Total time spent on this encounter: 25 minutes    --JOE Hernandez CNP on 3/12/2021 at 9:36 AM    An electronic signature was used to authenticate this note.

## 2021-03-15 ENCOUNTER — TELEPHONE (OUTPATIENT)
Dept: UROLOGY | Age: 31
End: 2021-03-15

## 2021-03-15 NOTE — TELEPHONE ENCOUNTER
Wagner Ervin requests that clinic return their call. She states that she was supposed to hear back on Friday about scheduling surgery.  The best time to reach her is Anytime.      Thank you.

## 2021-03-15 NOTE — TELEPHONE ENCOUNTER
I have schd patient for next thierry on 3/30/21. I have mailed this paperwork to her. No asa/bt starting 7 days prior. Angela Botello will contact her with arrival time and preop date and time. All of this is stated on paperwork I mailed in case she forgets. I will try to call patient soledad to go over this in case she has any questions.

## 2021-03-22 ENCOUNTER — OFFICE VISIT (OUTPATIENT)
Dept: OBGYN CLINIC | Age: 31
End: 2021-03-22
Payer: COMMERCIAL

## 2021-03-22 ENCOUNTER — TELEPHONE (OUTPATIENT)
Dept: UROLOGY | Age: 31
End: 2021-03-22

## 2021-03-22 VITALS
SYSTOLIC BLOOD PRESSURE: 130 MMHG | HEIGHT: 63 IN | BODY MASS INDEX: 35.26 KG/M2 | WEIGHT: 199 LBS | HEART RATE: 130 BPM | DIASTOLIC BLOOD PRESSURE: 85 MMHG

## 2021-03-22 DIAGNOSIS — R30.0 DYSURIA: ICD-10-CM

## 2021-03-22 DIAGNOSIS — Z01.419 WELL WOMAN EXAM: Primary | ICD-10-CM

## 2021-03-22 DIAGNOSIS — Z11.51 SCREENING FOR HPV (HUMAN PAPILLOMAVIRUS): ICD-10-CM

## 2021-03-22 DIAGNOSIS — Z12.4 SCREENING FOR CERVICAL CANCER: ICD-10-CM

## 2021-03-22 LAB
APPEARANCE FLUID: CLEAR
BILIRUBIN, POC: NORMAL
BLOOD URINE, POC: NORMAL
CLARITY, POC: CLEAR
COLOR, POC: YELLOW
GLUCOSE URINE, POC: NORMAL
KETONES, POC: NORMAL
LEUKOCYTE EST, POC: NORMAL
NITRITE, POC: NORMAL
PH, POC: 7
PROTEIN, POC: NORMAL
SPECIFIC GRAVITY, POC: 1.02
UROBILINOGEN, POC: NORMAL

## 2021-03-22 PROCEDURE — 81002 URINALYSIS NONAUTO W/O SCOPE: CPT | Performed by: NURSE PRACTITIONER

## 2021-03-22 PROCEDURE — 99395 PREV VISIT EST AGE 18-39: CPT | Performed by: NURSE PRACTITIONER

## 2021-03-22 ASSESSMENT — ENCOUNTER SYMPTOMS
EYES NEGATIVE: 1
GASTROINTESTINAL NEGATIVE: 1
RESPIRATORY NEGATIVE: 1

## 2021-03-22 NOTE — PATIENT INSTRUCTIONS
feel your breast tissue before moving on to the next spot. ? Check your entire breast, moving up and down as if following a strip from the collarbone to the bra line, and from the armpit to the ribs. Repeat until you have covered the entire breast.  ? Repeat this procedure for your left breast, using the pads of the 3 middle fingers of your right hand. · To examine your breasts while in the shower:  ? Place one arm over your head and lightly soap your breast on that side. ? Using the pads of your fingers, gently move your hand over your breast (in the strip pattern described above), feeling carefully for any lumps or changes. ? Repeat for the other breast.  · Have your doctor inspect anything you notice to see if you need further testing. Where can you learn more? Go to https://Night Node Softwarepelalyeb.French Girls. org and sign in to your [employer] account. Enter P148 in the TVTY box to learn more about \"Breast Self-Exam: Care Instructions. \"     If you do not have an account, please click on the \"Sign Up Now\" link. Current as of: December 17, 2020               Content Version: 12.8  © 0058-6956 [employer]. Care instructions adapted under license by [employer]. If you have questions about a medical condition or this instruction, always ask your healthcare professional. [place] [address] any warranty or liability for your use of this information. Patient Education        Well Visit, Ages 25 to 48: Care Instructions  Overview     Well visits can help you stay healthy. Your doctor has checked your overall health and may have suggested ways to take good care of yourself. Your doctor also may have recommended tests. At home, you can help prevent illness with healthy eating, regular exercise, and other steps. Follow-up care is a key part of your treatment and safety. Be sure to make and go to all appointments, and call your doctor if you are having problems.  It's also a good idea to know your test results and keep a list of the medicines you take. How can you care for yourself at home? · Get screening tests that you and your doctor decide on. Screening helps find diseases before any symptoms appear. · Eat healthy foods. Choose fruits, vegetables, whole grains, protein, and low-fat dairy foods. Limit fat, especially saturated fat. Reduce salt in your diet. · Limit alcohol. If you are a man, have no more than 2 drinks a day or 14 drinks a week. If you are a woman, have no more than 1 drink a day or 7 drinks a week. · Get at least 30 minutes of physical activity on most days of the week. Walking is a good choice. You also may want to do other activities, such as running, swimming, cycling, or playing tennis or team sports. Discuss any changes in your exercise program with your doctor. · Reach and stay at a healthy weight. This will lower your risk for many problems, such as obesity, diabetes, heart disease, and high blood pressure. · Do not smoke or allow others to smoke around you. If you need help quitting, talk to your doctor about stop-smoking programs and medicines. These can increase your chances of quitting for good. · Care for your mental health. It is easy to get weighed down by worry and stress. Learn strategies to manage stress, like deep breathing and mindfulness, and stay connected with your family and community. If you find you often feel sad or hopeless, talk with your doctor. Treatment can help. · Talk to your doctor about whether you have any risk factors for sexually transmitted infections (STIs). You can help prevent STIs if you wait to have sex with a new partner (or partners) until you've each been tested for STIs. It also helps if you use condoms (male or female condoms) and if you limit your sex partners to one person who only has sex with you. Vaccines are available for some STIs, such as HPV.   · Use birth control if it's important to you to prevent pregnancy. Talk with your doctor about the choices available and what might be best for you. · If you think you may have a problem with alcohol or drug use, talk to your doctor. This includes prescription medicines (such as amphetamines and opioids) and illegal drugs (such as cocaine and methamphetamine). Your doctor can help you figure out what type of treatment is best for you. · Protect your skin from too much sun. When you're outdoors from 10 a.m. to 4 p.m., stay in the shade or cover up with clothing and a hat with a wide brim. Wear sunglasses that block UV rays. Even when it's cloudy, put broad-spectrum sunscreen (SPF 30 or higher) on any exposed skin. · See a dentist one or two times a year for checkups and to have your teeth cleaned. · Wear a seat belt in the car. When should you call for help? Watch closely for changes in your health, and be sure to contact your doctor if you have any problems or symptoms that concern you. Where can you learn more? Go to https://Blurb.Paperwoven. org and sign in to your [employer] account. Enter P072 in the [facility] box to learn more about \"Well Visit, Ages 25 to 48: Care Instructions. \"     If you do not have an account, please click on the \"Sign Up Now\" link. Current as of: May 27, 2020               Content Version: 12.8  © 2006-2021 [employer] Care instructions adapted under license by [facility] ([facility]). If you have questions about a medical condition or this instruction, always ask your healthcare professional. [place] [address] any warranty or liability for your use of this information. Patient Education        Human Papillomavirus (HPV): Care Instructions  Overview  The human papillomavirus (HPV) is a very common virus. There are many types of HPV. Some types cause the common skin wart. Other types cause genital warts, which can be spread by sexual contact.  Some types can increase the risk for certain cancers, such as cervical or anal cancer. Having one type of HPV doesn't lead to having another type. Many women who have HPV may not know that they're infected until it's found with a cervical cancer screening test, such as an HPV test.  If an HPV screening test finds that you have the types of HPV that might lead to cancer, your doctor may suggest more tests. This doesn't mean you'll get cancer. But it means that you may have an increased risk. Abnormal cell changes caused by HPV often go away on their own. If they don't, they can be treated. Follow-up care is a key part of your treatment and safety. Be sure to make and go to all appointments, and call your doctor if you are having problems. It's also a good idea to know your test results and keep a list of the medicines you take. How can you care for yourself at home? · Do not smoke. Smoking increases the risk for cervical problems and cervical cancer. If you need help quitting, talk to your doctor about stop-smoking programs and medicines. These can increase your chances of quitting for good. · Use condoms every time you have sex. Use them from the beginning to the end of sexual contact. · Be sure to tell your sexual partner or partners that you have HPV. Even if you do not have symptoms, you can still pass HPV to others. · Having one sex partner (who does not have STIs and does not have sex with anyone else) can decrease your risk of getting STIs. When should you call for help? Watch closely for changes in your health, and be sure to contact your doctor if:    · You have vaginal pain during or after sex.     · You have vaginal bleeding when you are not in your menstrual period. Where can you learn more? Go to https://carmencita.health-partners. org and sign in to your [employer] account. Enter F690 in the [facility] box to learn more about \"Human Papillomavirus (HPV): Care Instructions. \"     If you do not have an account, please click on the \"Sign Up Now\" link. Current as of: February 26, 2020               Content Version: 12.8  © 2006-2021 [employer]. Care instructions adapted under license by [facility] ([facility]). If you have questions about a medical condition or this instruction, always ask your healthcare professional. [address] any warranty or liability for your use of this information.

## 2021-03-22 NOTE — PROGRESS NOTES
Brandy Gomez is a 27 y.o. female who presents today for her medical conditions/ complaints as noted below. Brandy Gomez is c/o of Annual Exam        HPI  Pt presents today for pap smear and breast exam.  She is asking for UA to be ran because she currently has kidney stone she is trying to pass. Last mammogram:     Last pap smear:  2020  Contraception:  none  : 1   Para:  1  AB:  0  Last bone density:  never  Last colonoscopy: never    Patient's last menstrual period was 2021 (approximate).   [id number]    Past Medical History:   Diagnosis Date    Acne     ADHD     Asthma     Breast disorder     Augmentation    Hypertension 2018    Hypoglycemia     Mental disorder     Anxiety and Depression    PCOS (polycystic ovarian syndrome)      Past Surgical History:   Procedure Laterality Date    BREAST BIOPSY Left     BREAST ENHANCEMENT SURGERY      CYSTOSCOPY Right 2020    RIGHT URETEROSCOPY LASER LITHOTRIPSY performed by Rachid Yeh MD at [facility] Right 2020    STENT PLACEMENT performed by Rachid Yeh MD at [address]       Family History   Problem Relation Age of Onset    Breast Cancer Maternal Grandmother [facility]     Cancer Paternal Grandmother         Female-spread to other organs     Brain Cancer Other         Maternal Great Uncle      Social History     Tobacco Use    Smoking status: Never Smoker    Smokeless tobacco: Never Used   Substance Use Topics    Alcohol use: No     Alcohol/week: 0.0 standard drinks       Current Outpatient Medications   Medication Sig Dispense Refill    Prenatal MV-Min-Fe Fum-FA-DHA (PRENATAL 1 PO) Take by mouth      phentermine (ADIPEX-P) 37.5 MG tablet TAKE 1 TABLET BY MOUTH EVERY DAY      spironolactone (ALDACTONE) 50 MG tablet Take 1 tablet by mouth daily 30 tablet 3    metFORMIN (GLUCOPHAGE) 1000 MG tablet Take 1 tablet by mouth 2 times daily (with meals) 60 tablet 11    UBRELVY 100 MG TABS TAKE 1 TABLET BY MOUTH. MAY REPEAT 2 HOURS AFTER FIRST DOSE AS NEEDED. No current facility-administered medications for this visit. Allergies   Allergen Reactions    Keflex [Cephalexin]     Penicillins Anaphylaxis and Hives     Vitals:    03/22/21 1537   BP: 130/85   Pulse: 130     Body mass index is 35.25 kg/m². Review of Systems   Constitutional: Negative. HENT: Negative. Eyes: Negative. Respiratory: Negative. Cardiovascular: Negative. Gastrointestinal: Negative. Genitourinary: Negative for difficulty urinating, dyspareunia, dysuria, enuresis, frequency, hematuria, menstrual problem, pelvic pain, urgency and vaginal discharge. Musculoskeletal: Negative. Skin: Negative. Neurological: Negative. Psychiatric/Behavioral: Negative. Physical Exam  Vitals signs and nursing note reviewed. Constitutional:       General: She is not in acute distress. Appearance: She is well-developed. She is not diaphoretic. HENT:      Head: Normocephalic and atraumatic. Right Ear: External ear normal.      Left Ear: External ear normal.      Nose: Nose normal.   Eyes:      General: Lids are normal.         Right eye: No discharge. Left eye: No discharge. Conjunctiva/sclera: Conjunctivae normal.      Pupils: Pupils are equal, round, and reactive to light. Neck:      Musculoskeletal: Normal range of motion and neck supple. Thyroid: No thyroid mass or thyromegaly. Trachea: No tracheal deviation. Cardiovascular:      Rate and Rhythm: Normal rate and regular rhythm. Heart sounds: Normal heart sounds. No murmur. Pulmonary:      Effort: Pulmonary effort is normal.      Breath sounds: Normal breath sounds. No wheezing. Chest:      Breasts: Breasts are symmetrical.         Right: No inverted nipple, mass, nipple discharge, skin change or tenderness.          Left: No inverted nipple, mass, nipple discharge, skin change or tenderness. Abdominal:      General: Bowel sounds are normal. There is no distension. Palpations: Abdomen is soft. There is no mass. Tenderness: There is no abdominal tenderness. Hernia: There is no hernia in the left inguinal area. Genitourinary:     Labia:         Right: No rash, tenderness or lesion. Left: No rash, tenderness or lesion. Vagina: No vaginal discharge or tenderness. Cervix: No cervical motion tenderness or discharge (normal cervical mucosa). Uterus: Not enlarged and not tender. Adnexa:         Right: No mass, tenderness or fullness. Left: No mass, tenderness or fullness. Rectum: No external hemorrhoid. Comments: Pap collected for cervical cytology  Musculoskeletal: Normal range of motion. General: No tenderness. Lymphadenopathy:      Cervical:      Right cervical: No superficial, deep or posterior cervical adenopathy. Left cervical: No superficial, deep or posterior cervical adenopathy. Upper Body:      Right upper body: No supraclavicular, pectoral or epitrochlear adenopathy. Left upper body: No supraclavicular, pectoral or epitrochlear adenopathy. Skin:     General: Skin is warm and dry. Findings: No rash. Neurological:      Mental Status: She is alert and oriented to person, place, and time. She is not disoriented. Sensory: No sensory deficit. Coordination: Coordination normal.      Gait: Gait normal.      Deep Tendon Reflexes: Reflexes are normal and symmetric. Psychiatric:         Speech: Speech normal.         Behavior: Behavior normal.         Thought Content: Thought content normal.         Judgment: Judgment normal.          Diagnosis Orders   1. Well woman exam  PAP SMEAR    Human papillomavirus (HPV) DNA probe thin prep high risk   2. Screening for cervical cancer  PAP SMEAR   3.  Screening for HPV (human papillomavirus)  Human papillomavirus (HPV) DNA probe thin prep high risk   4. Dysuria  POCT Urinalysis no Micro       MEDICATIONS:  No orders of the defined types were placed in this encounter. ORDERS:  Orders Placed This Encounter   Procedures    PAP SMEAR    Human papillomavirus (HPV) DNA probe thin prep high risk    POCT Urinalysis no Micro       PLAN:  Pap collected  Urine negative  Discussed getting pregnancy  Patient Instructions     Patient Education        Breast Self-Exam: Care Instructions  Your Care Instructions     A breast self-exam is when you check your breasts for lumps or changes. This regular exam helps you learn how your breasts normally look and feel. Most breast problems or changes are not because of cancer. Breast self-exam is not a substitute for a mammogram. Having regular breast exams by your doctor and regular mammograms improve your chances of finding any problems with your breasts. Some women set a time each month to do a step-by-step breast self-exam. Other women like a less formal system. They might look at their breasts as they brush their teeth, or feel their breasts once in a while in the shower. If you notice a change in your breast, tell your doctor. Follow-up care is a key part of your treatment and safety. Be sure to make and go to all appointments, and call your doctor if you are having problems. It's also a good idea to know your test results and keep a list of the medicines you take. How do you do a breast self-exam?  · The best time to examine your breasts is usually one week after your menstrual period begins. Your breasts should not be tender then. If you do not have periods, you might do your exam on a day of the month that is easy to remember. · To examine your breasts:  ? Remove all your clothes above the waist and lie down. When you are lying down, your breast tissue spreads evenly over your chest wall, which makes it easier to feel all your breast tissue. ?  Use the padsnot the fingertipsof the 3 middle Your doctor has checked your overall health and may have suggested ways to take good care of yourself. Your doctor also may have recommended tests. At home, you can help prevent illness with healthy eating, regular exercise, and other steps. Follow-up care is a key part of your treatment and safety. Be sure to make and go to all appointments, and call your doctor if you are having problems. It's also a good idea to know your test results and keep a list of the medicines you take. How can you care for yourself at home? · Get screening tests that you and your doctor decide on. Screening helps find diseases before any symptoms appear. · Eat healthy foods. Choose fruits, vegetables, whole grains, protein, and low-fat dairy foods. Limit fat, especially saturated fat. Reduce salt in your diet. · Limit alcohol. If you are a man, have no more than 2 drinks a day or 14 drinks a week. If you are a woman, have no more than 1 drink a day or 7 drinks a week. · Get at least 30 minutes of physical activity on most days of the week. Walking is a good choice. You also may want to do other activities, such as running, swimming, cycling, or playing tennis or team sports. Discuss any changes in your exercise program with your doctor. · Reach and stay at a healthy weight. This will lower your risk for many problems, such as obesity, diabetes, heart disease, and high blood pressure. · Do not smoke or allow others to smoke around you. If you need help quitting, talk to your doctor about stop-smoking programs and medicines. These can increase your chances of quitting for good. · Care for your mental health. It is easy to get weighed down by worry and stress. Learn strategies to manage stress, like deep breathing and mindfulness, and stay connected with your family and community. If you find you often feel sad or hopeless, talk with your doctor. Treatment can help.   · Talk to your doctor about whether you have any risk factors for sexually transmitted infections (STIs). You can help prevent STIs if you wait to have sex with a new partner (or partners) until you've each been tested for STIs. It also helps if you use condoms (male or female condoms) and if you limit your sex partners to one person who only has sex with you. Vaccines are available for some STIs, such as HPV. · Use birth control if it's important to you to prevent pregnancy. Talk with your doctor about the choices available and what might be best for you. · If you think you may have a problem with alcohol or drug use, talk to your doctor. This includes prescription medicines (such as amphetamines and opioids) and illegal drugs (such as cocaine and methamphetamine). Your doctor can help you figure out what type of treatment is best for you. · Protect your skin from too much sun. When you're outdoors from 10 a.m. to 4 p.m., stay in the shade or cover up with clothing and a hat with a wide brim. Wear sunglasses that block UV rays. Even when it's cloudy, put broad-spectrum sunscreen (SPF 30 or higher) on any exposed skin. · See a dentist one or two times a year for checkups and to have your teeth cleaned. · Wear a seat belt in the car. When should you call for help? Watch closely for changes in your health, and be sure to contact your doctor if you have any problems or symptoms that concern you. Where can you learn more? Go to https://AquaBlingvernonAsurvest.[email or web address]. org and sign in to your [employer] account. Enter P072 in the [facility] box to learn more about \"Well Visit, Ages 25 to 48: Care Instructions. \"     If you do not have an account, please click on the \"Sign Up Now\" link. Current as of: May 27, 2020               Content Version: 12.8  © 0220-8553 Healthwise, Incorporated. Care instructions adapted under license by [facility] ([facility]).  If you have questions about a medical condition or this instruction, always ask your healthcare professional. [employer] disclaims any warranty or liability for your use of this information. Patient Education        Human Papillomavirus (HPV): Care Instructions  Overview  The human papillomavirus (HPV) is a very common virus. There are many types of HPV. Some types cause the common skin wart. Other types cause genital warts, which can be spread by sexual contact. Some types can increase the risk for certain cancers, such as cervical or anal cancer. Having one type of HPV doesn't lead to having another type. Many women who have HPV may not know that they're infected until it's found with a cervical cancer screening test, such as an HPV test.  If an HPV screening test finds that you have the types of HPV that might lead to cancer, your doctor may suggest more tests. This doesn't mean you'll get cancer. But it means that you may have an increased risk. Abnormal cell changes caused by HPV often go away on their own. If they don't, they can be treated. Follow-up care is a key part of your treatment and safety. Be sure to make and go to all appointments, and call your doctor if you are having problems. It's also a good idea to know your test results and keep a list of the medicines you take. How can you care for yourself at home? · Do not smoke. Smoking increases the risk for cervical problems and cervical cancer. If you need help quitting, talk to your doctor about stop-smoking programs and medicines. These can increase your chances of quitting for good. · Use condoms every time you have sex. Use them from the beginning to the end of sexual contact. · Be sure to tell your sexual partner or partners that you have HPV. Even if you do not have symptoms, you can still pass HPV to others. · Having one sex partner (who does not have STIs and does not have sex with anyone else) can decrease your risk of getting STIs. When should you call for help?   Watch closely for changes in your health, and be sure to contact your doctor if:    · You have vaginal pain during or after sex.     · You have vaginal bleeding when you are not in your menstrual period. Where can you learn more? Go to https://chpelioneleweb.[email or web address] org and sign in to your [employer] account. Enter F690 in the [employer] box to learn more about \"Human Papillomavirus (HPV): Care Instructions. \"     If you do not have an account, please click on the \"Sign Up Now\" link. Current as of: February 26, 2020               Content Version: 12.8  © 6895-9588 Healthwise, [employer]. Care instructions adapted under license by [facility] [address] If you have questions about a medical condition or this instruction, always ask your healthcare professional. [address] any warranty or liability for your use of this information.

## 2021-03-26 LAB
HPV COMMENT: NORMAL
HPV TYPE 16: NOT DETECTED
HPV TYPE 18: NOT DETECTED
HPVOH (OTHER TYPES): NOT DETECTED

## 2021-03-29 ENCOUNTER — HOSPITAL ENCOUNTER (OUTPATIENT)
Dept: PREADMISSION TESTING | Age: 31
Discharge: HOME | End: 2021-04-02
Payer: COMMERCIAL

## 2021-03-29 VITALS — WEIGHT: 200 LBS | BODY MASS INDEX: 35.44 KG/M2 | HEIGHT: 63 IN

## 2021-03-29 LAB
ANION GAP SERPL CALCULATED.3IONS-SCNC: 13 MMOL/L (ref 7–19)
APTT: 29.3 SEC (ref 26–36.2)
BASOPHILS ABSOLUTE: 0 K/UL (ref 0–0.2)
BASOPHILS RELATIVE PERCENT: 0.3 % (ref 0–1)
BUN BLDV-MCNC: 11 MG/DL (ref 6–20)
CALCIUM SERPL-MCNC: 9 MG/DL (ref 8.6–10)
CHLORIDE BLD-SCNC: 100 MMOL/L (ref 98–111)
CO2: 24 MMOL/L (ref 22–29)
COLLAGEN EPINEPHRINE TIME: 124 SEC (ref 84–176)
CREAT SERPL-MCNC: 0.7 MG/DL (ref 0.5–0.9)
EKG P AXIS: 48 DEGREES
EKG P-R INTERVAL: 146 MS
EKG Q-T INTERVAL: 328 MS
EKG QRS DURATION: 82 MS
EKG QTC CALCULATION (BAZETT): 403 MS
EKG T AXIS: 48 DEGREES
EOSINOPHILS ABSOLUTE: 0.2 K/UL (ref 0–0.6)
EOSINOPHILS RELATIVE PERCENT: 2.3 % (ref 0–5)
GFR AFRICAN AMERICAN: >59
GFR NON-AFRICAN AMERICAN: >60
GLUCOSE BLD-MCNC: 110 MG/DL (ref 74–109)
HCT VFR BLD CALC: 41.1 % (ref 37–47)
HEMOGLOBIN: 13.6 G/DL (ref 12–16)
IMMATURE GRANULOCYTES #: 0 K/UL
INR BLD: 0.97 (ref 0.88–1.18)
LYMPHOCYTES ABSOLUTE: 1.9 K/UL (ref 1.1–4.5)
LYMPHOCYTES RELATIVE PERCENT: 20.2 % (ref 20–40)
MCH RBC QN AUTO: 28.6 PG (ref 27–31)
MCHC RBC AUTO-ENTMCNC: 33.1 G/DL (ref 33–37)
MCV RBC AUTO: 86.5 FL (ref 81–99)
MONOCYTES ABSOLUTE: 0.4 K/UL (ref 0–0.9)
MONOCYTES RELATIVE PERCENT: 4.7 % (ref 0–10)
NEUTROPHILS ABSOLUTE: 6.8 K/UL (ref 1.5–7.5)
NEUTROPHILS RELATIVE PERCENT: 72.2 % (ref 50–65)
PDW BLD-RTO: 12.6 % (ref 11.5–14.5)
PLATELET # BLD: 318 K/UL (ref 130–400)
PLATELET FUNCTION INTERPRETATION: NORMAL
PMV BLD AUTO: 9.5 FL (ref 9.4–12.3)
POTASSIUM SERPL-SCNC: 3.8 MMOL/L (ref 3.5–5)
PROTHROMBIN TIME: 12.8 SEC (ref 12–14.6)
RBC # BLD: 4.75 M/UL (ref 4.2–5.4)
SARS-COV-2, PCR: NOT DETECTED
SODIUM BLD-SCNC: 137 MMOL/L (ref 136–145)
WBC # BLD: 9.4 K/UL (ref 4.8–10.8)

## 2021-03-29 PROCEDURE — 85576 BLOOD PLATELET AGGREGATION: CPT

## 2021-03-29 PROCEDURE — 93010 ELECTROCARDIOGRAM REPORT: CPT | Performed by: INTERNAL MEDICINE

## 2021-03-29 PROCEDURE — U0003 INFECTIOUS AGENT DETECTION BY NUCLEIC ACID (DNA OR RNA); SEVERE ACUTE RESPIRATORY SYNDROME CORONAVIRUS 2 (SARS-COV-2) (CORONAVIRUS DISEASE [COVID-19]), AMPLIFIED PROBE TECHNIQUE, MAKING USE OF HIGH THROUGHPUT TECHNOLOGIES AS DESCRIBED BY CMS-2020-01-R: HCPCS

## 2021-03-29 PROCEDURE — 85610 PROTHROMBIN TIME: CPT

## 2021-03-29 PROCEDURE — 80048 BASIC METABOLIC PNL TOTAL CA: CPT

## 2021-03-29 PROCEDURE — 93005 ELECTROCARDIOGRAM TRACING: CPT | Performed by: NURSE PRACTITIONER

## 2021-03-29 PROCEDURE — U0005 INFEC AGEN DETEC AMPLI PROBE: HCPCS

## 2021-03-29 PROCEDURE — 85730 THROMBOPLASTIN TIME PARTIAL: CPT

## 2021-03-29 PROCEDURE — 85025 COMPLETE CBC W/AUTO DIFF WBC: CPT

## 2021-03-29 RX ORDER — CIPROFLOXACIN 2 MG/ML
400 INJECTION, SOLUTION INTRAVENOUS ONCE
Status: CANCELLED | OUTPATIENT
Start: 2021-03-30

## 2021-03-30 ENCOUNTER — ANESTHESIA (OUTPATIENT)
Dept: OPERATING ROOM | Age: 31
End: 2021-03-30
Payer: COMMERCIAL

## 2021-03-30 ENCOUNTER — APPOINTMENT (OUTPATIENT)
Dept: GENERAL RADIOLOGY | Age: 31
End: 2021-03-30
Attending: UROLOGY
Payer: COMMERCIAL

## 2021-03-30 ENCOUNTER — TELEPHONE (OUTPATIENT)
Dept: UROLOGY | Age: 31
End: 2021-03-30

## 2021-03-30 ENCOUNTER — ANESTHESIA EVENT (OUTPATIENT)
Dept: OPERATING ROOM | Age: 31
End: 2021-03-30
Payer: COMMERCIAL

## 2021-03-30 ENCOUNTER — HOSPITAL ENCOUNTER (OUTPATIENT)
Age: 31
Setting detail: OUTPATIENT SURGERY
Discharge: HOME | End: 2021-03-30
Attending: UROLOGY | Admitting: UROLOGY
Payer: COMMERCIAL

## 2021-03-30 VITALS
HEIGHT: 63 IN | WEIGHT: 200 LBS | DIASTOLIC BLOOD PRESSURE: 96 MMHG | SYSTOLIC BLOOD PRESSURE: 136 MMHG | OXYGEN SATURATION: 95 % | RESPIRATION RATE: 14 BRPM | BODY MASS INDEX: 35.44 KG/M2 | TEMPERATURE: 98.3 F | HEART RATE: 97 BPM

## 2021-03-30 LAB — HCG(URINE) PREGNANCY TEST: NEGATIVE

## 2021-03-30 PROCEDURE — 74018 RADEX ABDOMEN 1 VIEW: CPT

## 2021-03-30 PROCEDURE — 6370000000 HC RX 637 (ALT 250 FOR IP): Performed by: ANESTHESIOLOGY

## 2021-03-30 PROCEDURE — 2500000003 HC RX 250 WO HCPCS: Performed by: ANESTHESIOLOGY

## 2021-03-30 PROCEDURE — 84703 CHORIONIC GONADOTROPIN ASSAY: CPT

## 2021-03-30 RX ORDER — MORPHINE SULFATE 4 MG/ML
4 INJECTION, SOLUTION INTRAMUSCULAR; INTRAVENOUS
Status: DISCONTINUED | OUTPATIENT
Start: 2021-03-30 | End: 2021-03-30 | Stop reason: HOSPADM

## 2021-03-30 RX ORDER — SODIUM CHLORIDE, SODIUM LACTATE, POTASSIUM CHLORIDE, CALCIUM CHLORIDE 600; 310; 30; 20 MG/100ML; MG/100ML; MG/100ML; MG/100ML
INJECTION, SOLUTION INTRAVENOUS CONTINUOUS
Status: DISCONTINUED | OUTPATIENT
Start: 2021-03-30 | End: 2021-03-30 | Stop reason: HOSPADM

## 2021-03-30 RX ORDER — DIPHENHYDRAMINE HYDROCHLORIDE 50 MG/ML
12.5 INJECTION INTRAMUSCULAR; INTRAVENOUS
Status: DISCONTINUED | OUTPATIENT
Start: 2021-03-30 | End: 2021-03-30 | Stop reason: HOSPADM

## 2021-03-30 RX ORDER — LABETALOL HYDROCHLORIDE 5 MG/ML
5 INJECTION, SOLUTION INTRAVENOUS EVERY 10 MIN PRN
Status: DISCONTINUED | OUTPATIENT
Start: 2021-03-30 | End: 2021-03-30 | Stop reason: HOSPADM

## 2021-03-30 RX ORDER — MEPERIDINE HYDROCHLORIDE 50 MG/ML
12.5 INJECTION INTRAMUSCULAR; INTRAVENOUS; SUBCUTANEOUS EVERY 5 MIN PRN
Status: DISCONTINUED | OUTPATIENT
Start: 2021-03-30 | End: 2021-03-30 | Stop reason: HOSPADM

## 2021-03-30 RX ORDER — MORPHINE SULFATE 4 MG/ML
2 INJECTION, SOLUTION INTRAMUSCULAR; INTRAVENOUS EVERY 5 MIN PRN
Status: DISCONTINUED | OUTPATIENT
Start: 2021-03-30 | End: 2021-03-30 | Stop reason: HOSPADM

## 2021-03-30 RX ORDER — PROMETHAZINE HYDROCHLORIDE 25 MG/ML
6.25 INJECTION, SOLUTION INTRAMUSCULAR; INTRAVENOUS
Status: DISCONTINUED | OUTPATIENT
Start: 2021-03-30 | End: 2021-03-30 | Stop reason: HOSPADM

## 2021-03-30 RX ORDER — FENTANYL CITRATE 50 UG/ML
50 INJECTION, SOLUTION INTRAMUSCULAR; INTRAVENOUS
Status: DISCONTINUED | OUTPATIENT
Start: 2021-03-30 | End: 2021-03-30 | Stop reason: HOSPADM

## 2021-03-30 RX ORDER — MORPHINE SULFATE 4 MG/ML
4 INJECTION, SOLUTION INTRAMUSCULAR; INTRAVENOUS EVERY 5 MIN PRN
Status: DISCONTINUED | OUTPATIENT
Start: 2021-03-30 | End: 2021-03-30 | Stop reason: HOSPADM

## 2021-03-30 RX ORDER — SCOLOPAMINE TRANSDERMAL SYSTEM 1 MG/1
1 PATCH, EXTENDED RELEASE TRANSDERMAL ONCE
Status: DISCONTINUED | OUTPATIENT
Start: 2021-03-30 | End: 2021-03-30 | Stop reason: HOSPADM

## 2021-03-30 RX ORDER — SODIUM CHLORIDE 0.9 % (FLUSH) 0.9 %
10 SYRINGE (ML) INJECTION EVERY 12 HOURS SCHEDULED
Status: DISCONTINUED | OUTPATIENT
Start: 2021-03-30 | End: 2021-03-30 | Stop reason: HOSPADM

## 2021-03-30 RX ORDER — MIDAZOLAM HYDROCHLORIDE 1 MG/ML
2 INJECTION INTRAMUSCULAR; INTRAVENOUS
Status: DISCONTINUED | OUTPATIENT
Start: 2021-03-30 | End: 2021-03-30 | Stop reason: HOSPADM

## 2021-03-30 RX ORDER — HYDRALAZINE HYDROCHLORIDE 20 MG/ML
5 INJECTION INTRAMUSCULAR; INTRAVENOUS EVERY 10 MIN PRN
Status: DISCONTINUED | OUTPATIENT
Start: 2021-03-30 | End: 2021-03-30 | Stop reason: HOSPADM

## 2021-03-30 RX ORDER — SODIUM CHLORIDE 0.9 % (FLUSH) 0.9 %
10 SYRINGE (ML) INJECTION PRN
Status: DISCONTINUED | OUTPATIENT
Start: 2021-03-30 | End: 2021-03-30 | Stop reason: HOSPADM

## 2021-03-30 RX ORDER — LIDOCAINE HYDROCHLORIDE 10 MG/ML
1 INJECTION, SOLUTION EPIDURAL; INFILTRATION; INTRACAUDAL; PERINEURAL
Status: DISCONTINUED | OUTPATIENT
Start: 2021-03-30 | End: 2021-03-30 | Stop reason: HOSPADM

## 2021-03-30 RX ORDER — CIPROFLOXACIN 2 MG/ML
400 INJECTION, SOLUTION INTRAVENOUS ONCE
Status: DISCONTINUED | OUTPATIENT
Start: 2021-03-30 | End: 2021-03-30 | Stop reason: HOSPADM

## 2021-03-30 RX ORDER — HYDROMORPHONE HYDROCHLORIDE 1 MG/ML
0.25 INJECTION, SOLUTION INTRAMUSCULAR; INTRAVENOUS; SUBCUTANEOUS EVERY 5 MIN PRN
Status: DISCONTINUED | OUTPATIENT
Start: 2021-03-30 | End: 2021-03-30 | Stop reason: HOSPADM

## 2021-03-30 RX ORDER — HYDROMORPHONE HYDROCHLORIDE 1 MG/ML
0.5 INJECTION, SOLUTION INTRAMUSCULAR; INTRAVENOUS; SUBCUTANEOUS EVERY 5 MIN PRN
Status: DISCONTINUED | OUTPATIENT
Start: 2021-03-30 | End: 2021-03-30 | Stop reason: HOSPADM

## 2021-03-30 RX ORDER — METOCLOPRAMIDE HYDROCHLORIDE 5 MG/ML
10 INJECTION INTRAMUSCULAR; INTRAVENOUS
Status: DISCONTINUED | OUTPATIENT
Start: 2021-03-30 | End: 2021-03-30 | Stop reason: HOSPADM

## 2021-03-30 RX ADMIN — FAMOTIDINE 20 MG: 10 INJECTION, SOLUTION INTRAVENOUS at 15:06

## 2021-03-30 ASSESSMENT — LIFESTYLE VARIABLES: SMOKING_STATUS: 0

## 2021-03-30 ASSESSMENT — ENCOUNTER SYMPTOMS: SHORTNESS OF BREATH: 0

## 2021-03-30 ASSESSMENT — PAIN - FUNCTIONAL ASSESSMENT: PAIN_FUNCTIONAL_ASSESSMENT: 0-10

## 2021-03-30 NOTE — ANESTHESIA PRE PROCEDURE
Mental disorder     Anxiety and Depression    PCOS (polycystic ovarian syndrome)        Past Surgical History:        Procedure Laterality Date    BREAST BIOPSY Left     BREAST ENHANCEMENT SURGERY      CYSTOSCOPY Right 4/8/2020    RIGHT URETEROSCOPY LASER LITHOTRIPSY performed by Lauren Liang MD at [facility] Right 4/8/2020    STENT PLACEMENT performed by Lauren Liang MD at [place] [address] EXTRACTION         Social History:    Social History     Tobacco Use    Smoking status: Never Smoker    Smokeless tobacco: Never Used   Substance Use Topics    Alcohol use: No     Alcohol/week: 0.0 standard drinks                                Counseling given: Not Answered      Vital Signs (Current):   Vitals:    03/30/21 1353   BP: (!) 136/96   Pulse: 97   Resp: 14   Temp: 98.3 °F (36.8 °C)   SpO2: 95%   Weight: 200 lb (90.7 kg)   Height: 5' 3\" (1.6 m)                                              BP Readings from Last 3 Encounters:   03/30/21 (!) 136/96   03/22/21 130/85   09/17/20 135/85       NPO Status:                                                                                 BMI:   Wt Readings from Last 3 Encounters:   03/30/21 200 lb (90.7 kg)   03/29/21 200 lb (90.7 kg)   03/22/21 199 lb (90.3 kg)     Body mass index is 35.43 kg/m².     CBC:   Lab Results   Component Value Date    WBC 9.4 03/29/2021    RBC 4.75 03/29/2021    HGB 13.6 03/29/2021    HCT 41.1 03/29/2021    MCV 86.5 03/29/2021    RDW 12.6 03/29/2021     03/29/2021       CMP:   Lab Results   Component Value Date     03/29/2021    K 3.8 03/29/2021     03/29/2021    CO2 24 03/29/2021    BUN 11 03/29/2021    CREATININE 0.7 03/29/2021    GFRAA >59 03/29/2021    LABGLOM >60 03/29/2021    GLUCOSE 110 03/29/2021    PROT 8.1 04/07/2020    PROT 7.7 01/31/2013    CALCIUM 9.0 03/29/2021    BILITOT 0.4 04/07/2020    ALKPHOS 59 04/07/2020    AST 25 04/07/2020    ALT 34 04/07/2020       POC Tests: No results for input(s): POCGLU, POCNA, POCK, POCCL, POCBUN, POCHEMO, POCHCT in the last 72 hours. Coags:   Lab Results   Component Value Date    PROTIME 12.8 03/29/2021    INR 0.97 03/29/2021    APTT 29.3 03/29/2021       HCG (If Applicable):   Lab Results   Component Value Date    PREGTESTUR Negative 04/07/2020        ABGs:   Lab Results   Component Value Date    PHART 6.930 08/07/2018    PO2ART 11.0 08/07/2018    CDA9JXI 91.0 08/07/2018    GFJ0AQA 19.1 08/07/2018    BEART -15.9 08/07/2018    O4WTADTH 16.4 08/07/2018        Type & Screen (If Applicable):  No results found for: LABABO, LABRH    Drug/Infectious Status (If Applicable):  No results found for: HIV, HEPCAB    COVID-19 Screening (If Applicable):   Lab Results   Component Value Date    COVID19 Not Detected 03/29/2021           Anesthesia Evaluation  Patient summary reviewed and Nursing notes reviewed no history of anesthetic complications:   Airway: Mallampati: II  TM distance: >3 FB   Neck ROM: full  Mouth opening: > = 3 FB Dental: normal exam         Pulmonary:Negative Pulmonary ROS and normal exam  breath sounds clear to auscultation  (+) asthma:     (-) shortness of breath and not a current smoker          Patient did not smoke on day of surgery. Cardiovascular:    (+) hypertension:,     (-) CAD,  angina and  CHF    NYHA Classification: I  ECG reviewed  Rhythm: regular  Rate: normal           Beta Blocker:  Not on Beta Blocker         Neuro/Psych:   Negative Neuro/Psych ROS  (+) psychiatric history:   (-) seizures, CVA and depression/anxiety            GI/Hepatic/Renal: Neg GI/Hepatic/Renal ROS  (+) renal disease: kidney stones,      (-) hiatal hernia and GERD       Endo/Other: Negative Endo/Other ROS             Pt had PAT visit. Abdominal:       Abdomen: soft. Vascular:                                        Anesthesia Plan      general     ASA 2     (Iv zofran within 30 min of closing )  Induction: intravenous.   BIS  MIPS: Postoperative opioids intended and Prophylactic antiemetics administered. Anesthetic plan and risks discussed with patient. Use of blood products discussed with patient whom. Plan discussed with CRNA.     Attending anesthesiologist reviewed and agrees with Pre Eval content              Crys Andersen MD   3/30/2021

## 2021-03-30 NOTE — INTERVAL H&P NOTE
Update History & Physical    The patient's History and Physical of March 12, 2021 was reviewed with the patient and I examined the patient. There was no stone seen on KUB today. I reviewed all her records and xrays, small punctate stone seen on CT and previous KUB is not visible today and therefore will caNCEL ESWL. She will f/u in office with [facility]  JOE.   UA on 3/22 did not look infected     Electronically signed by Juan Carlos Oneil MD on 3/30/2021 at 5:26 PM

## 2021-03-31 ENCOUNTER — TRANSCRIBE ORDERS (OUTPATIENT)
Dept: GENERAL RADIOLOGY | Facility: HOSPITAL | Age: 31
End: 2021-03-31

## 2021-03-31 ENCOUNTER — HOSPITAL ENCOUNTER (OUTPATIENT)
Dept: GENERAL RADIOLOGY | Facility: HOSPITAL | Age: 31
Discharge: HOME | End: 2021-03-31
Admitting: NURSE PRACTITIONER

## 2021-03-31 DIAGNOSIS — R10.9 FLANK PAIN: Primary | ICD-10-CM

## 2021-03-31 PROCEDURE — 74018 RADEX ABDOMEN 1 VIEW: CPT

## 2021-04-09 ENCOUNTER — PATIENT MESSAGE (OUTPATIENT)
Dept: OBGYN CLINIC | Age: 31
End: 2021-04-09

## 2021-04-12 NOTE — TELEPHONE ENCOUNTER
From: Katherine Estimable  To: Marla Randy, APRN - CNP  Sent: 4/9/2021 9:11 PM CDT  Subject: Non-Urgent Medical Question    I had taken Phentermine prescribed by my PCP in February to Mid-March. During that time I had an extra period and then now have missed my current period projected date by a week and several days. Last year I know Naomi Jin had worked with me to get my periods straightened out, and one of the options was medroxyprogesterone to try to straighten them out. I'm not sure if this is something that is an option or that j should just wait. My  and I were going to try to conceive soon. .. and I fear with kidney stone issues, bowel issues from phentermine, and now a missed period, Its messed my entire system up. Are there any recommendations for my missed period? Thank you.

## 2021-05-03 ENCOUNTER — OFFICE VISIT (OUTPATIENT)
Dept: UROLOGY | Age: 31
End: 2021-05-03
Payer: COMMERCIAL

## 2021-05-03 VITALS — HEIGHT: 63 IN | BODY MASS INDEX: 36.43 KG/M2 | WEIGHT: 205.6 LBS | TEMPERATURE: 98 F

## 2021-05-03 DIAGNOSIS — R39.89 PAIN IN URETHRA: ICD-10-CM

## 2021-05-03 DIAGNOSIS — N20.0 LEFT RENAL STONE: Primary | ICD-10-CM

## 2021-05-03 LAB
BACTERIA URINE, POC: ABNORMAL
BILIRUBIN URINE: 0 MG/DL
BLOOD, URINE: NEGATIVE
CASTS URINE, POC: 0
CLARITY: CLEAR
COLOR: YELLOW
CRYSTALS URINE, POC: 0
EPI CELLS URINE, POC: ABNORMAL
GLUCOSE URINE: ABNORMAL
KETONES, URINE: NEGATIVE
LEUKOCYTE EST, POC: ABNORMAL
NITRITE, URINE: NEGATIVE
PH UA: 7 (ref 4.5–8)
PROTEIN UA: NEGATIVE
RBC URINE, POC: 0
SPECIFIC GRAVITY UA: 1.03 (ref 1–1.03)
UROBILINOGEN, URINE: NORMAL
WBC URINE, POC: 3
YEAST URINE, POC: 0

## 2021-05-03 PROCEDURE — 99214 OFFICE O/P EST MOD 30 MIN: CPT | Performed by: NURSE PRACTITIONER

## 2021-05-03 PROCEDURE — 81001 URINALYSIS AUTO W/SCOPE: CPT | Performed by: NURSE PRACTITIONER

## 2021-05-03 NOTE — PROGRESS NOTES
Alexandr Alfred is a 27 y.o. female who presents today   Chief Complaint   Patient presents with    Follow-up     1 mo f/u. Still having pain occasioally in urethra     Patient is a 80-year-old female presents the clinic today for follow-up. Patient had a small 3 mm nonobstructing renal calculi it was scheduled for ESWL. April Hughes was not visible on KUB day of ESWL. Patient denies any flank pain today. Patient's primary care treated her for constipation and has resolved flank pain. Patient's punctate stone unlikely the cause of her pain. She continues to have intermittent sharp pain in her urethra. She states recurrent UTIs approximately 3-4 times a year. She drinks a significant amount of water daily and ensure she voids pre and post coital.    Past Medical History:   Diagnosis Date    Acne     ADHD     Asthma     Breast disorder     Augmentation    Hypertension 7/27/2018    Hypoglycemia     Mental disorder     Anxiety and Depression    PCOS (polycystic ovarian syndrome)        Past Surgical History:   Procedure Laterality Date    BREAST BIOPSY Left     BREAST ENHANCEMENT SURGERY      CYSTOSCOPY Right 4/8/2020    RIGHT URETEROSCOPY LASER LITHOTRIPSY performed by Roxanna Duran MD at [address] Right 4/8/2020    STENT PLACEMENT performed by Roxanna Duran MD at [address] [address] EXTRACTION         Current Outpatient Medications   Medication Sig Dispense Refill    Prenatal MV-Min-Fe Fum-FA-DHA (PRENATAL 1 PO) Take by mouth      UBRELVY 100 MG TABS TAKE 1 TABLET BY MOUTH. MAY REPEAT 2 HOURS AFTER FIRST DOSE AS NEEDED.  spironolactone (ALDACTONE) 50 MG tablet Take 1 tablet by mouth daily 30 tablet 3    metFORMIN (GLUCOPHAGE) 1000 MG tablet Take 1 tablet by mouth 2 times daily (with meals) 60 tablet 11    phentermine (ADIPEX-P) 37.5 MG tablet TAKE 1 TABLET BY MOUTH EVERY DAY       No current facility-administered medications for this visit.         Allergies   Allergen Reactions    Keflex [Cephalexin]     Penicillins Anaphylaxis and Hives       Social History     Socioeconomic History    Marital status:      Spouse name: Not on file    Number of children: Not on file    Years of education: Not on file    Highest education level: Not on file   Occupational History    Not on file   Social Needs    Financial resource strain: Not on file    Food insecurity     Worry: Not on file     Inability: Not on file    Transportation needs     Medical: Not on file     Non-medical: Not on file   Tobacco Use    Smoking status: Never Smoker    Smokeless tobacco: Never Used   Substance and Sexual Activity    Alcohol use: No     Alcohol/week: 0.0 standard drinks    Drug use: No    Sexual activity: Yes     Partners: Male   Lifestyle    Physical activity     Days per week: Not on file     Minutes per session: Not on file    Stress: Not on file   Relationships    Social connections     Talks on phone: Not on file     Gets together: Not on file     Attends Hoahaoism service: Not on file     Active member of club or organization: Not on file     Attends meetings of clubs or organizations: Not on file     Relationship status: Not on file    Intimate partner violence     Fear of current or ex partner: Not on file     Emotionally abused: Not on file     Physically abused: Not on file     Forced sexual activity: Not on file   Other Topics Concern    Not on file   Social History Narrative    Not on file       Family History   Problem Relation Age of Onset    Breast Cancer Maternal Grandmother [address] Cancer Paternal Grandmother         Female-spread to other organs     Brain Cancer Other         Maternal Great Uncle        REVIEW OF SYSTEMS:  Review of Systems   Constitutional: Negative for activity change, appetite change, chills, fatigue, fever and unexpected weight change. HENT: Negative for congestion and hearing loss.     Respiratory: Negative for chest tightness and shortness of breath. Cardiovascular: Negative for chest pain. Gastrointestinal: Negative for abdominal distention, abdominal pain, constipation, nausea and vomiting. Genitourinary: Negative for decreased urine volume, difficulty urinating, dysuria, flank pain, frequency, hematuria, pelvic pain and urgency. Musculoskeletal: Negative for arthralgias and gait problem. Skin: Negative for color change. Neurological: Negative for dizziness, syncope, weakness, numbness and headaches. Psychiatric/Behavioral: Negative for behavioral problems and confusion. The patient is not nervous/anxious. PHYSICAL EXAM:  Temp 98 °F (36.7 °C)   Ht 5' 3\" (1.6 m)   Wt 205 lb 9.6 oz (93.3 kg)   BMI 36.42 kg/m²   Physical Exam  Constitutional:       General: She is not in acute distress. Appearance: Normal appearance. She is not toxic-appearing. HENT:      Head: Normocephalic. Neck:      Musculoskeletal: Normal range of motion. No neck rigidity. Cardiovascular:      Rate and Rhythm: Normal rate and regular rhythm. Pulmonary:      Effort: Pulmonary effort is normal. No respiratory distress. Breath sounds: No wheezing. Abdominal:      General: There is no distension. Palpations: Abdomen is soft. Tenderness: There is no abdominal tenderness. There is no right CVA tenderness or left CVA tenderness. Musculoskeletal: Normal range of motion. Skin:     General: Skin is warm and dry. Neurological:      Mental Status: She is alert and oriented to person, place, and time. Sensory: Sensation is intact. Motor: No weakness. Gait: Gait normal.   Psychiatric:         Mood and Affect: Mood and affect normal.         Behavior: Behavior normal.       IMAGING:  KUB obtained on 3/31/2021 at [address] [address] does indicate moderate to large stool in the colon. 1. Left renal stone  Nonobstructing left renal calculi. Denies flank pain. Will obtain KUB and follow-up in 1 year. - Culture, Urine  - POCT Urinalysis Dipstick w/ Micro (Auto)    2. Pain in urethra  UA today did reveal some bacteria. Will send for urine culture and treat accordingly. Will call patient with results. - XR ABDOMEN (KUB) (SINGLE AP VIEW); Future      Orders Placed This Encounter   Procedures    Culture, Urine     Order Specific Question:   Specify (ex-cath, midstream, cysto, etc)? Answer:   clean catch    XR ABDOMEN (KUB) (SINGLE AP VIEW)     Standing Status:   Future     Standing Expiration Date:   5/3/2022    POCT Urinalysis Dipstick w/ Micro (Auto)        Return in 1 year (on 5/3/2022) for KUB prior. All information inputted into the note by the MA to include chief complaint, past medical history, past surgical history, medications, allergies, social and family history and review of systems has been reviewed and updated as needed by me. EMR Dragon/transcription disclaimer: Much of this documentt is electronic  transcription/translation of spoken language to printed text. The  electronic translation of spoken language may be erroneous, or at times,  nonsensical words or phrases may be inadvertently transcribed.  Although I  have reviewed the document for such errors, some may still exist.

## 2021-05-04 ASSESSMENT — ENCOUNTER SYMPTOMS
COLOR CHANGE: 0
CHEST TIGHTNESS: 0
ABDOMINAL DISTENTION: 0
VOMITING: 0
ABDOMINAL PAIN: 0
NAUSEA: 0
CONSTIPATION: 0
SHORTNESS OF BREATH: 0

## 2021-05-05 LAB — URINE CULTURE, ROUTINE: NORMAL

## 2021-07-26 ENCOUNTER — TELEPHONE (OUTPATIENT)
Dept: OBGYN CLINIC | Age: 31
End: 2021-07-26

## 2021-07-26 DIAGNOSIS — R30.0 DYSURIA: ICD-10-CM

## 2021-07-26 DIAGNOSIS — R30.0 DYSURIA: Primary | ICD-10-CM

## 2021-07-28 ENCOUNTER — OFFICE VISIT (OUTPATIENT)
Dept: OBGYN CLINIC | Age: 31
End: 2021-07-28
Payer: COMMERCIAL

## 2021-07-28 VITALS
HEIGHT: 63 IN | DIASTOLIC BLOOD PRESSURE: 84 MMHG | WEIGHT: 206 LBS | SYSTOLIC BLOOD PRESSURE: 130 MMHG | HEART RATE: 123 BPM | BODY MASS INDEX: 36.5 KG/M2

## 2021-07-28 DIAGNOSIS — N91.2 AMENORRHEA: Primary | ICD-10-CM

## 2021-07-28 DIAGNOSIS — Z36.89 CONFIRM FETAL CARDIAC ACTIVITY USING ULTRASOUND: ICD-10-CM

## 2021-07-28 DIAGNOSIS — Z32.00 POSSIBLE PREGNANCY: ICD-10-CM

## 2021-07-28 DIAGNOSIS — N91.2 AMENORRHEA: ICD-10-CM

## 2021-07-28 DIAGNOSIS — Z32.00 POSSIBLE PREGNANCY: Primary | ICD-10-CM

## 2021-07-28 DIAGNOSIS — B37.31 VAGINAL YEAST INFECTION: ICD-10-CM

## 2021-07-28 LAB
CONTROL: PRESENT
GONADOTROPIN, CHORIONIC (HCG) QUANT: 779.3 MIU/ML (ref 0–5.3)
ORGANISM: ABNORMAL
PREGNANCY TEST URINE, POC: ABNORMAL
PROGESTERONE LEVEL: 6.99 NG/ML
URINE CULTURE, ROUTINE: ABNORMAL
URINE CULTURE, ROUTINE: ABNORMAL

## 2021-07-28 PROCEDURE — 99213 OFFICE O/P EST LOW 20 MIN: CPT | Performed by: NURSE PRACTITIONER

## 2021-07-28 PROCEDURE — 81025 URINE PREGNANCY TEST: CPT | Performed by: NURSE PRACTITIONER

## 2021-07-28 RX ORDER — PROGESTERONE 200 MG/1
200 CAPSULE ORAL NIGHTLY
Qty: 90 CAPSULE | Refills: 0 | Status: ON HOLD | OUTPATIENT
Start: 2021-07-28 | End: 2022-03-12 | Stop reason: HOSPADM

## 2021-07-28 NOTE — PROGRESS NOTES
Pt is here for pregnancy confirmation and has vaginal itching and burning. Pt's b/p was elevated checked glucose and protein and both negative.
mass, tenderness or fullness. Left: No mass, tenderness or fullness. Musculoskeletal:         General: Normal range of motion. Cervical back: Normal range of motion. Skin:     General: Skin is warm and dry. Neurological:      Mental Status: She is alert and oriented to person, place, and time. Diagnosis Orders   1. Amenorrhea  POCT urine pregnancy    HCG, Quantitative, Pregnancy   2. Possible pregnancy  Progesterone   3. Confirm fetal cardiac activity using ultrasound  US OB LESS THAN 14 WEEKS SINGLE OR FIRST GESTATION    External Referral To Maternal Fetal Medicine   4. Vaginal yeast infection         MEDICATIONS:  Orders Placed This Encounter   Medications    terconazole (TERAZOL 7) 0.4 % vaginal cream     Sig: Place vaginally nightly. Dispense:  1 Tube     Refill:  0       ORDERS:  Orders Placed This Encounter   Procedures    US OB LESS THAN 14 WEEKS SINGLE OR FIRST GESTATION    Progesterone    HCG, Quantitative, Pregnancy    External Referral To Maternal Fetal Medicine    POCT urine pregnancy       PLAN:  1. Amenorrhea- HCG and progesterone level today. 2. Yeast infection- terazol started today.

## 2021-07-30 ENCOUNTER — HOSPITAL ENCOUNTER (EMERGENCY)
Age: 31
Discharge: HOME | End: 2021-07-30
Payer: COMMERCIAL

## 2021-07-30 VITALS
HEART RATE: 116 BPM | TEMPERATURE: 98.2 F | DIASTOLIC BLOOD PRESSURE: 85 MMHG | SYSTOLIC BLOOD PRESSURE: 118 MMHG | OXYGEN SATURATION: 98 % | RESPIRATION RATE: 14 BRPM

## 2021-07-30 DIAGNOSIS — R03.0 ELEVATED BLOOD PRESSURE READING: Primary | ICD-10-CM

## 2021-07-30 DIAGNOSIS — R07.9 CHEST PAIN, UNSPECIFIED TYPE: ICD-10-CM

## 2021-07-30 DIAGNOSIS — Z34.90 PREGNANCY AT EARLY STAGE: ICD-10-CM

## 2021-07-30 DIAGNOSIS — Z36.89 CONFIRM FETAL CARDIAC ACTIVITY USING ULTRASOUND: Primary | ICD-10-CM

## 2021-07-30 LAB
ADENOVIRUS BY PCR: NOT DETECTED
ALBUMIN SERPL-MCNC: 4.2 G/DL (ref 3.5–5.2)
ALP BLD-CCNC: 56 U/L (ref 35–104)
ALT SERPL-CCNC: 23 U/L (ref 5–33)
ANION GAP SERPL CALCULATED.3IONS-SCNC: 13 MMOL/L (ref 7–19)
AST SERPL-CCNC: 19 U/L (ref 5–32)
BACTERIA: ABNORMAL /HPF
BASOPHILS ABSOLUTE: 0 K/UL (ref 0–0.2)
BASOPHILS RELATIVE PERCENT: 0.2 % (ref 0–1)
BILIRUB SERPL-MCNC: 0.3 MG/DL (ref 0.2–1.2)
BILIRUBIN URINE: NEGATIVE
BLOOD, URINE: NEGATIVE
BORDETELLA PARAPERTUSSIS BY PCR: NOT DETECTED
BORDETELLA PERTUSSIS BY PCR: NOT DETECTED
BUN BLDV-MCNC: 8 MG/DL (ref 6–20)
CALCIUM SERPL-MCNC: 9 MG/DL (ref 8.6–10)
CHLAMYDOPHILIA PNEUMONIAE BY PCR: NOT DETECTED
CHLORIDE BLD-SCNC: 102 MMOL/L (ref 98–111)
CLARITY: ABNORMAL
CO2: 22 MMOL/L (ref 22–29)
COLOR: YELLOW
CORONAVIRUS 229E BY PCR: NOT DETECTED
CORONAVIRUS HKU1 BY PCR: NOT DETECTED
CORONAVIRUS NL63 BY PCR: NOT DETECTED
CORONAVIRUS OC43 BY PCR: NOT DETECTED
CREAT SERPL-MCNC: 0.7 MG/DL (ref 0.5–0.9)
CRYSTALS, UA: ABNORMAL /HPF
EKG P AXIS: 43 DEGREES
EKG P-R INTERVAL: 160 MS
EKG Q-T INTERVAL: 340 MS
EKG QRS DURATION: 84 MS
EKG QTC CALCULATION (BAZETT): 399 MS
EKG T AXIS: 21 DEGREES
EOSINOPHILS ABSOLUTE: 0.1 K/UL (ref 0–0.6)
EOSINOPHILS RELATIVE PERCENT: 0.7 % (ref 0–5)
EPITHELIAL CELLS, UA: 19 /HPF (ref 0–5)
GFR AFRICAN AMERICAN: >59
GFR NON-AFRICAN AMERICAN: >60
GLUCOSE BLD-MCNC: 99 MG/DL (ref 74–109)
GLUCOSE URINE: NEGATIVE MG/DL
GONADOTROPIN, CHORIONIC (HCG) QUANT: 1484 MIU/ML (ref 0–5.3)
HCT VFR BLD CALC: 40.1 % (ref 37–47)
HEMOGLOBIN: 13.2 G/DL (ref 12–16)
HUMAN METAPNEUMOVIRUS BY PCR: NOT DETECTED
HUMAN RHINOVIRUS/ENTEROVIRUS BY PCR: NOT DETECTED
HYALINE CASTS: 7 /HPF (ref 0–8)
IMMATURE GRANULOCYTES #: 0 K/UL
INFLUENZA A BY PCR: NOT DETECTED
INFLUENZA B BY PCR: NOT DETECTED
KETONES, URINE: NEGATIVE MG/DL
LEUKOCYTE ESTERASE, URINE: ABNORMAL
LYMPHOCYTES ABSOLUTE: 1.5 K/UL (ref 1.1–4.5)
LYMPHOCYTES RELATIVE PERCENT: 15.6 % (ref 20–40)
MCH RBC QN AUTO: 29.3 PG (ref 27–31)
MCHC RBC AUTO-ENTMCNC: 32.9 G/DL (ref 33–37)
MCV RBC AUTO: 88.9 FL (ref 81–99)
MONOCYTES ABSOLUTE: 0.4 K/UL (ref 0–0.9)
MONOCYTES RELATIVE PERCENT: 4.6 % (ref 0–10)
MYCOPLASMA PNEUMONIAE BY PCR: NOT DETECTED
NEUTROPHILS ABSOLUTE: 7.3 K/UL (ref 1.5–7.5)
NEUTROPHILS RELATIVE PERCENT: 78.6 % (ref 50–65)
NITRITE, URINE: NEGATIVE
PARAINFLUENZA VIRUS 1 BY PCR: NOT DETECTED
PARAINFLUENZA VIRUS 2 BY PCR: NOT DETECTED
PARAINFLUENZA VIRUS 3 BY PCR: NOT DETECTED
PARAINFLUENZA VIRUS 4 BY PCR: NOT DETECTED
PDW BLD-RTO: 12.6 % (ref 11.5–14.5)
PH UA: 6.5 (ref 5–8)
PLATELET # BLD: 262 K/UL (ref 130–400)
PMV BLD AUTO: 9 FL (ref 9.4–12.3)
POTASSIUM REFLEX MAGNESIUM: 3.9 MMOL/L (ref 3.5–5)
PROTEIN UA: NEGATIVE MG/DL
RBC # BLD: 4.51 M/UL (ref 4.2–5.4)
RBC UA: 6 /HPF (ref 0–4)
RESPIRATORY SYNCYTIAL VIRUS BY PCR: NOT DETECTED
SARS-COV-2, PCR: NOT DETECTED
SODIUM BLD-SCNC: 137 MMOL/L (ref 136–145)
SPECIFIC GRAVITY UA: 1.02 (ref 1–1.03)
TOTAL PROTEIN: 7.2 G/DL (ref 6.6–8.7)
TROPONIN: <0.01 NG/ML (ref 0–0.03)
UROBILINOGEN, URINE: 0.2 E.U./DL
WBC # BLD: 9.3 K/UL (ref 4.8–10.8)
WBC UA: 5 /HPF (ref 0–5)

## 2021-07-30 PROCEDURE — 84484 ASSAY OF TROPONIN QUANT: CPT

## 2021-07-30 PROCEDURE — 85025 COMPLETE CBC W/AUTO DIFF WBC: CPT

## 2021-07-30 PROCEDURE — 93005 ELECTROCARDIOGRAM TRACING: CPT | Performed by: NURSE PRACTITIONER

## 2021-07-30 PROCEDURE — 81001 URINALYSIS AUTO W/SCOPE: CPT

## 2021-07-30 PROCEDURE — 87086 URINE CULTURE/COLONY COUNT: CPT

## 2021-07-30 PROCEDURE — 93010 ELECTROCARDIOGRAM REPORT: CPT | Performed by: INTERNAL MEDICINE

## 2021-07-30 PROCEDURE — 80053 COMPREHEN METABOLIC PANEL: CPT

## 2021-07-30 PROCEDURE — 84702 CHORIONIC GONADOTROPIN TEST: CPT

## 2021-07-30 PROCEDURE — 36415 COLL VENOUS BLD VENIPUNCTURE: CPT

## 2021-07-30 PROCEDURE — 99282 EMERGENCY DEPT VISIT SF MDM: CPT

## 2021-07-30 PROCEDURE — 0202U NFCT DS 22 TRGT SARS-COV-2: CPT

## 2021-07-30 ASSESSMENT — ENCOUNTER SYMPTOMS
VOMITING: 0
SHORTNESS OF BREATH: 0
COUGH: 0
ABDOMINAL PAIN: 0
DIARRHEA: 0

## 2021-07-30 NOTE — ED PROVIDER NOTES
[facility] EMERGENCY DEPT  eMERGENCY dEPARTMENT eNCOUnter      Pt Name: Saray Sutherland  MRN: [id number]  [place] 1990  Date of evaluation: 7/30/2021  Provider: JOE Thomson    CHIEF COMPLAINT       Chief Complaint   Patient presents with    Hypertension         HISTORY OF PRESENT ILLNESS   (Location/Symptom, Timing/Onset,Context/Setting, Quality, Duration, Modifying Factors, Severity)  Note limiting factors. Oralee Primas a 27 y.o. female who presents to the emergency department for evaluation of hypertension. Pt tells me that she is 6 weeks pregnant relating that she had follow up with her ob provider at this facility 3 days ago having started progesterone. She relates that she has had intermittent episodes of chest pain since over past 3 days associated with elevated blood pressure readings at home with systolic numbers in the 346I and diastolic numbers in the 177'V. She denies current chest pain. She has had no shortness of breath or cough. She tells me that she has had sinus congestion over past few days. She denies vaginal bleeding tells me that she had previous vaginal spotting. She denies abdominal/pelvic pain as well as dysuria. She tells me that she was previously treated for pregnancy induced hypertension during her first pregnancy. She tells me that this is her second pregnancy and that she has one child. HPI    Nursing Notes were reviewed. REVIEW OF SYSTEMS    (2-9 systems for level 4, 10 or more for level 5)     Review of Systems   Constitutional: Negative for fever. HENT: Positive for postnasal drip. Respiratory: Negative for cough and shortness of breath. Cardiovascular: Positive for chest pain (gone). Gastrointestinal: Negative for abdominal pain, diarrhea and vomiting. Genitourinary: Negative for dysuria. All other systems reviewed and are negative. A complete review of systems was performed and is negative except as noted above in the HPI.        PAST MEDICAL HISTORY     Past Medical History:   Diagnosis Date    Acne     ADHD     Asthma     Breast disorder     Augmentation    Hypertension 7/27/2018    Hypoglycemia     Mental disorder     Anxiety and Depression    PCOS (polycystic ovarian syndrome)          SURGICAL HISTORY       Past Surgical History:   Procedure Laterality Date    BREAST BIOPSY Left     BREAST ENHANCEMENT SURGERY      CYSTOSCOPY Right 4/8/2020    RIGHT URETEROSCOPY LASER LITHOTRIPSY performed by Pj Gee MD at [facility] Right 4/8/2020    STENT PLACEMENT performed by Pj Gee MD at [address] [place] [address]       Discharge Medication List as of 7/30/2021 12:20 PM      CONTINUE these medications which have NOT CHANGED    Details   Choline Dihydrogen Citrate (CHOLINE CITRATE PO) Take by mouthHistorical Med      Calcium Polycarbophil (FIBER-CAPS PO) Take by mouthHistorical Med      Docusate Sodium (COLACE PO) Take by mouthHistorical Med      terconazole (TERAZOL 7) 0.4 % vaginal cream Place vaginally nightly., Disp-1 Tube, R-0Normal      progesterone (PROMETRIUM) 200 MG CAPS capsule Take 1 capsule by mouth nightly, Disp-90 capsule, R-0Normal      Prenatal MV-Min-Fe Fum-FA-DHA (PRENATAL 1 PO) Take by mouthHistorical Med      phentermine (ADIPEX-P) 37.5 MG tablet TAKE 1 TABLET BY MOUTH EVERY DAYHistorical Med      UBRELVY 100 MG TABS TAKE 1 TABLET BY MOUTH. MAY REPEAT 2 HOURS AFTER FIRST DOSE AS NEEDED., DAWHistorical Med      spironolactone (ALDACTONE) 50 MG tablet Take 1 tablet by mouth daily, Disp-30 tablet, R-3Normal      metFORMIN (GLUCOPHAGE) 1000 MG tablet Take 1 tablet by mouth 2 times daily (with meals), Disp-60 tablet, R-11Normal             ALLERGIES     Keflex [cephalexin] and Penicillins    FAMILY HISTORY       Family History   Problem Relation Age of Onset    Breast Cancer Maternal Grandmother [address] [facility]     Cancer Paternal Grandmother Female-spread to other organs     Brain Cancer Other         Maternal Great Uncle           SOCIAL HISTORY       Social History     Socioeconomic History    Marital status:      Spouse name: Not on file    Number of children: Not on file    Years of education: Not on file    Highest education level: Not on file   Occupational History    Not on file   Tobacco Use    Smoking status: Never Smoker    Smokeless tobacco: Never Used   Vaping Use    Vaping Use: Never used   Substance and Sexual Activity    Alcohol use: No     Alcohol/week: 0.0 standard drinks    Drug use: No    Sexual activity: Yes     Partners: Male   Other Topics Concern    Not on file   Social History Narrative    Not on file     Social Determinants of Health     Financial Resource Strain:     Difficulty of Paying Living Expenses:    Food Insecurity:     Worried About Running Out of Food in the Last Year:     Ran Out of Food in the Last Year:    Transportation Needs:     Lack of Transportation (Medical):      Lack of Transportation (Non-Medical):    Physical Activity:     Days of Exercise per Week:     Minutes of Exercise per Session:    Stress:     Feeling of Stress :    Social Connections:     Frequency of Communication with Friends and Family:     Frequency of Social Gatherings with Friends and Family:     Attends Jehovah's witness Services:     Active Member of Clubs or Organizations:     Attends Club or Organization Meetings:     Marital Status:    Intimate Partner Violence:     Fear of Current or Ex-Partner:     Emotionally Abused:     Physically Abused:     Sexually Abused:        SCREENINGS             PHYSICAL EXAM    (up to 7 for level 4, 8 or more for level 5)     ED Triage Vitals   BP Temp Temp src Pulse Resp SpO2 Height Weight   -- -- -- -- -- -- -- --     Vitals:    07/30/21 0945 07/30/21 1000 07/30/21 1045 07/30/21 1219   BP: 123/85 122/84 118/85    Pulse: 116      Resp:    14   Temp: 98.2 °F (36.8 °C) TempSrc: Oral      SpO2: 95%   98%         Physical Exam  Vitals reviewed. HENT:      Head: Normocephalic. Right Ear: External ear normal.      Left Ear: External ear normal.      Mouth/Throat:      Mouth: Mucous membranes are moist.      Pharynx: Oropharynx is clear. Eyes:      Conjunctiva/sclera: Conjunctivae normal.      Pupils: Pupils are equal, round, and reactive to light. Cardiovascular:      Rate and Rhythm: Normal rate and regular rhythm. Heart sounds: Normal heart sounds. Pulmonary:      Effort: Pulmonary effort is normal.      Breath sounds: Normal breath sounds. Abdominal:      General: Bowel sounds are normal.      Palpations: Abdomen is soft. Tenderness: There is no abdominal tenderness. Musculoskeletal:         General: Normal range of motion. Cervical back: Normal range of motion. Comments: No obvious lower extremity edema   Skin:     General: Skin is warm and dry. Neurological:      Mental Status: She is alert and oriented to person, place, and time. DIAGNOSTIC RESULTS     EKG: All EKG's are interpreted by the Emergency Department Physician who either signs or Co-signs this chart in the absence of acardiologist.    There is a regular rate and rhythm. SR hr 93b/min Normal [place] interval and normal P waves. Normal QRS interval. Normal QT interval. No obvious ST elevation or ST depression.        RADIOLOGY:   Non-plain film images such as CT, Ultrasound andMRI are read by the radiologist. Plain radiographic images are visualized and preliminarily interpreted by the emergency physician with the below findings:        Interpretation per the Radiologist below, if available at the time of this note:    No orders to display         ED BEDSIDE ULTRASOUND:   Performed by ED Physician - none    LABS:  Labs Reviewed   CBC WITH AUTO DIFFERENTIAL - Abnormal; Notable for the following components:       Result Value    MCHC 32.9 (*)     MPV 9.0 (*)     Neutrophils % 78.6 (*)     Lymphocytes % 15.6 (*)     All other components within normal limits   URINE RT REFLEX TO CULTURE - Abnormal; Notable for the following components:    Clarity, UA CLOUDY (*)     Leukocyte Esterase, Urine SMALL (*)     All other components within normal limits   HCG, QUANTITATIVE, PREGNANCY - Abnormal; Notable for the following components:    hCG Quant 1484.0 (*)     All other components within normal limits   MICROSCOPIC URINALYSIS - Abnormal; Notable for the following components:    Bacteria, UA 1+ (*)     Crystals, UA NEG (*)     RBC, UA 6 (*)     All other components within normal limits   RESPIRATORY PANEL, MOLECULAR, WITH COVID-19   CULTURE, URINE   TROPONIN   COMPREHENSIVE METABOLIC PANEL W/ REFLEX TO MG FOR LOW K       All other labs were within normal range or not returned as of this dictation. RE-ASSESSMENT           EMERGENCY DEPARTMENT COURSE and DIFFERENTIALDIAGNOSIS/MDM:   Vitals:    Vitals:    07/30/21 0945 07/30/21 1000 07/30/21 1045 07/30/21 1219   BP: 123/85 122/84 118/85    Pulse: 116      Resp:    14   Temp: 98.2 °F (36.8 °C)      TempSrc: Oral      SpO2: 95%   98%       MDM      CONSULTS:  None    PROCEDURES:  Unless otherwise notedbelow, none     Procedures    FINAL IMPRESSION     1. Elevated blood pressure reading    2. Pregnancy at early stage    3.  Chest pain, unspecified type          DISPOSITION/PLAN   DISPOSITION        PATIENT REFERRED TO:  your ob provider      as scheduled      DISCHARGE MEDICATIONS:       Discharge Medication List as of 7/30/2021 12:20 PM          (Pleasenote that portions of this note were completed with a voice recognition program.  Efforts were made to edit the dictations but occasionally words are mis-transcribed.)              Hector Reyes, JOE  07/30/21 [id number]

## 2021-08-01 LAB — URINE CULTURE, ROUTINE: NORMAL

## 2021-08-01 ASSESSMENT — ENCOUNTER SYMPTOMS
ALLERGIC/IMMUNOLOGIC NEGATIVE: 1
RESPIRATORY NEGATIVE: 1
GASTROINTESTINAL NEGATIVE: 1
EYES NEGATIVE: 1

## 2021-08-13 ENCOUNTER — INITIAL PRENATAL (OUTPATIENT)
Dept: OBGYN CLINIC | Age: 31
End: 2021-08-13

## 2021-08-13 VITALS
SYSTOLIC BLOOD PRESSURE: 131 MMHG | HEART RATE: 100 BPM | WEIGHT: 208 LBS | DIASTOLIC BLOOD PRESSURE: 86 MMHG | BODY MASS INDEX: 36.85 KG/M2

## 2021-08-13 DIAGNOSIS — Z34.91 PRENATAL CARE IN FIRST TRIMESTER: Primary | ICD-10-CM

## 2021-08-13 DIAGNOSIS — O26.891 DYSURIA DURING PREGNANCY IN FIRST TRIMESTER: ICD-10-CM

## 2021-08-13 DIAGNOSIS — R30.0 DYSURIA DURING PREGNANCY IN FIRST TRIMESTER: ICD-10-CM

## 2021-08-13 PROCEDURE — 0500F INITIAL PRENATAL CARE VISIT: CPT | Performed by: NURSE PRACTITIONER

## 2021-08-13 NOTE — PROGRESS NOTES
CNM Prenatal Office Note  Subjective:  Francis Reilly is here for initial obstetrical visit. She has had confirmation of pregnancy. Her history has been reviewed and OB complications identified. Today she is 7w0d weeks EGA. She is doing well and has no complaints. She  does not have n/v/d/c, vaginal bleeding, or other common first trimester complaints. Objective: Mother's Prenatal Vitals  BP: 131/86  Weight: 208 lb (94.3 kg)  Pulse: 100  Patient Position: Sitting  Prenatal Fetal Information  Fetal Heart Rate: US-208  Movement: Absent  Pt is A&Ox3, in no acute distress. Normocephalic, atraumatic. PERRL. Resp even and non-labored. Skin pink, warm & dry. Gravid abdomen. WILDE's well. Gait steady. Assessment:    IUP at 7w0d wks      Diagnosis Orders   1. Prenatal care in first trimester  HIV Obstetric Panel    Varicella Zoster Antibody, IgG    Herpes simplex virus (HSV) I/II antibodies IgG & IgM w/ reflex    Culture, Urine    C.trachomatis N.gonorrhoeae DNA, Urine    Chlamydia/N. Gonorrhoeae/T. Vaginalis   2. Dysuria during pregnancy in first trimester       Plan:   Pt counseled on balanced nutrition, adequate fluid intake, taking PNV daily, and exercise. Continue with routine prenatal care.  Nallely dougherty antibody  Congratulations Francis Reilly on your positive pregnancy test! Today we will check your pregnancy hormone levels and plan for your next visit. A list of safe medications was provided and discussed.    We discussed avoidance of listeriosis - no deli meats unless heated until steaming hot, no soft cheeses - Brie, Camebert, Feta, blue-veined cheeses, queso fernandez and [employer], no unpasteurized milk or dairy; avoidance of toxoplasmosis, i.e. no undercooked meat or eggs, no gardening barefoot or without gloves on, and pt is not to change cat litter; avoidance of mercury, i.e. no shark, twin mackerel, tilefish, or swordfish, and limit seafood to 12 oz/wk; limit caffeine intake to 200mg/day; traveling OK but encouraged frequent ambulation; recommended no lifting over 25lbs without assistance; encouraged exercise, healthy diet, and increased water consumption; no tanning beds, hot tubs, xrays or saunas. No smoking, alcohol, or illicit drugs; OK to do hair, nails, paint (latex), and routine housecleaning with good ventilation. Certain labs and ultrasounds are required at certain times during pregnancy but others are optional, including the serum integrated screen/[place]/AFP/ [facility], and other genetic testing. The patient was encouraged to attend childbirth classes and general hospital information was provided based on patients hospital of choice.  RTC in 4 wks for prenatal visit, u/s, physical exam, and results    MEDICATIONS:  No orders of the defined types were placed in this encounter. ORDERS:  Orders Placed This Encounter   Procedures    Culture, Urine    C.trachomatis N.gonorrhoeae DNA, Urine    Chlamydia/N. Gonorrhoeae/T. Vaginalis    HIV Obstetric Panel    Varicella Zoster Antibody, IgG    Herpes simplex virus (HSV) I/II antibodies IgG & IgM w/ reflex

## 2021-08-13 NOTE — PATIENT INSTRUCTIONS
Patient Education        Weeks 6 to 10 of Your Pregnancy: Care Instructions  Overview     During the first 6 to 10 weeks of your pregnancy, your body goes through many changes. Your baby grows very quickly, even though you can't feel it yet. You may start to feel different, both in your body and your emotions. Because each pregnancy is unique, there's no right way to feel. You may feel the healthiest you've ever been, or you might feel tired or sick to your stomach (\"morning sickness\"). These early weeks are a time to make healthy choices and to eat the best foods for you and your baby. This is also a good time to think about birth defects testing. These are tests done during pregnancy to look for possible problems with the baby. First-trimester tests for birth defects can be done between 8 and 13 weeks of pregnancy, depending on the test. Talk with your doctor about what kinds of tests are available. Follow-up care is a key part of your treatment and safety. Be sure to make and go to all appointments, and call your doctor if you are having problems. It's also a good idea to know your test results and keep a list of the medicines you take. How can you care for yourself at home? Eat well  · Eat at least 3 meals and 2 healthy snacks every day. Eat fresh, whole foods, including:  ? 7 or more servings of bread, tortillas, cereal, rice, pasta, or oatmeal.  ? 3 or more servings of vegetables, especially leafy green vegetables. ? 2 or more servings of fruits. ? 3 or more servings of milk, yogurt, or cheese. ? 2 or more servings of meat, turkey, chicken, fish, eggs, or dried beans. · Drink plenty of fluids, especially water. Avoid sodas and other sweetened drinks. · Choose foods that have important vitamins for your baby, such as calcium, iron, and folate.   ? Dairy products, tofu, canned fish with bones, almonds, broccoli, dark leafy greens, corn tortillas, and fortified orange juice are good sources of October 8, 2020               Content Version: 12.9  © 2006-2021 [employer]. Care instructions adapted under license by [facility] ([facility]). If you have questions about a medical condition or this instruction, always ask your healthcare professional. [address] any warranty or liability for your use of this information.

## 2021-08-13 NOTE — PROGRESS NOTES
Pt is here for routine parental care. Pt denies vaginal bleeding, cramping or leaking of fluid. Pt feels like she might have a UTI order put it for pt to leave in lab.

## 2021-08-16 RX ORDER — NITROFURANTOIN 25; 75 MG/1; MG/1
100 CAPSULE ORAL 2 TIMES DAILY
Qty: 20 CAPSULE | Refills: 0 | Status: SHIPPED | OUTPATIENT
Start: 2021-08-16 | End: 2021-08-26

## 2021-08-17 ENCOUNTER — TELEPHONE (OUTPATIENT)
Dept: OBGYN CLINIC | Age: 31
End: 2021-08-17

## 2021-08-17 NOTE — TELEPHONE ENCOUNTER
Pt called and states she has a UTI and started Macrobid yesterday, today she saw some pinkish color on her toilet paper. When I asked if it was from her urethra or vagina, she said urethra. I told her that this was from the UTI. I encourage her to take Tylenol for her back and lower abdominal pain, and continue taking her Macrobid. I told her if the pain worsens, to let us know or go to the ER. Pt v/u.
Yes

## 2021-08-23 ENCOUNTER — ROUTINE PRENATAL (OUTPATIENT)
Dept: OBGYN CLINIC | Age: 31
End: 2021-08-23

## 2021-08-23 VITALS
BODY MASS INDEX: 37.2 KG/M2 | WEIGHT: 210 LBS | HEART RATE: 103 BPM | DIASTOLIC BLOOD PRESSURE: 91 MMHG | SYSTOLIC BLOOD PRESSURE: 132 MMHG

## 2021-08-23 DIAGNOSIS — Z3A.08 8 WEEKS GESTATION OF PREGNANCY: ICD-10-CM

## 2021-08-23 DIAGNOSIS — B37.31 VAGINAL YEAST INFECTION: ICD-10-CM

## 2021-08-23 DIAGNOSIS — O26.891 DYSURIA DURING PREGNANCY IN FIRST TRIMESTER: Primary | ICD-10-CM

## 2021-08-23 DIAGNOSIS — R30.0 DYSURIA DURING PREGNANCY IN FIRST TRIMESTER: Primary | ICD-10-CM

## 2021-08-23 DIAGNOSIS — Z34.91 PRENATAL CARE IN FIRST TRIMESTER: ICD-10-CM

## 2021-08-23 DIAGNOSIS — Z87.442 PERSONAL HISTORY OF RENAL CALCULI: ICD-10-CM

## 2021-08-23 LAB
AMORPHOUS: ABNORMAL /HPF
BACTERIA: ABNORMAL /HPF
BILIRUBIN URINE: NEGATIVE
BLOOD, URINE: NEGATIVE
CLARITY: ABNORMAL
COLOR: YELLOW
EPITHELIAL CELLS, UA: ABNORMAL /HPF
GLUCOSE URINE: NEGATIVE MG/DL
KETONES, URINE: NEGATIVE MG/DL
LEUKOCYTE ESTERASE, URINE: ABNORMAL
NITRITE, URINE: NEGATIVE
PH UA: 7 (ref 5–8)
PROTEIN UA: NEGATIVE MG/DL
RBC UA: ABNORMAL /HPF (ref 0–2)
SPECIFIC GRAVITY UA: 1.02 (ref 1–1.03)
UROBILINOGEN, URINE: 0.2 E.U./DL
WBC UA: ABNORMAL /HPF (ref 0–5)

## 2021-08-23 PROCEDURE — 0502F SUBSEQUENT PRENATAL CARE: CPT | Performed by: NURSE PRACTITIONER

## 2021-08-23 NOTE — PROGRESS NOTES
Pt is here for routine parental care. Pt denies vaginal bleeding, cramping or leaking of fluid. Pt stated Sat, had light green discharge. Pt states she feels normal-jacob today but still uncomfortable.

## 2021-08-23 NOTE — PROGRESS NOTES
CNM Prenatal Office Note  Subjective:  Ruchi Ryan is here for a return obstetrical visit. Today she is 8w3d weeks EGA. She is doing well and has no complaints. She  has not had any vaginal bleeding, n/v, dizziness but has experienced some cramping. Objective: Mother's Prenatal Vitals  BP: (!) 132/91  Weight: 210 lb (95.3 kg)  Pulse: 103  Patient Position: Sitting  Prenatal Fetal Information  Fetal Heart Rate:   Movement: Absent  Pt is A&Ox3, in no acute distress. Normocephalic, atraumatic. PERRL. Resp even and non-labored. Skin pink, warm & dry. Gravid abdomen. WILDE's well. Gait steady. Assessment:    IUP at 8w3d wks      Diagnosis Orders   1. Dysuria during pregnancy in first trimester  Urinalysis Reflex to Culture   2. Prenatal care in first trimester     3. Vaginal yeast infection     4. 8 weeks gestation of pregnancy     5. Personal history of renal calculi       Plan:   Pt counseled on review of prenatal labs    Continue with routine prenatal care.  RTC in 4 wks for prenatal visit    MEDICATIONS:  Orders Placed This Encounter   Medications    terconazole (TERAZOL 7) 0.4 % vaginal cream     Sig: Place vaginally nightly. Dispense:  1 Tube     Refill:  0     ORDERS:  Orders Placed This Encounter   Procedures    Urinalysis Reflex to Culture       More than 50% of this 20 min visit was education and counseling.

## 2021-09-03 DIAGNOSIS — O23.41 URINARY TRACT INFECTION IN MOTHER DURING FIRST TRIMESTER OF PREGNANCY: Primary | ICD-10-CM

## 2021-09-10 ENCOUNTER — ROUTINE PRENATAL (OUTPATIENT)
Dept: OBGYN CLINIC | Age: 31
End: 2021-09-10

## 2021-09-10 ENCOUNTER — OFFICE VISIT (OUTPATIENT)
Dept: OBGYN CLINIC | Age: 31
End: 2021-09-10

## 2021-09-10 VITALS
DIASTOLIC BLOOD PRESSURE: 80 MMHG | BODY MASS INDEX: 37.38 KG/M2 | HEART RATE: 93 BPM | WEIGHT: 211 LBS | SYSTOLIC BLOOD PRESSURE: 130 MMHG

## 2021-09-10 VITALS
SYSTOLIC BLOOD PRESSURE: 130 MMHG | DIASTOLIC BLOOD PRESSURE: 83 MMHG | BODY MASS INDEX: 37.38 KG/M2 | WEIGHT: 211 LBS | HEART RATE: 101 BPM

## 2021-09-10 DIAGNOSIS — Z34.81 ENCOUNTER FOR SUPERVISION OF OTHER NORMAL PREGNANCY IN FIRST TRIMESTER: Primary | ICD-10-CM

## 2021-09-10 DIAGNOSIS — R30.0 DYSURIA DURING PREGNANCY IN FIRST TRIMESTER: ICD-10-CM

## 2021-09-10 DIAGNOSIS — O23.591: Primary | ICD-10-CM

## 2021-09-10 DIAGNOSIS — Z3A.11 11 WEEKS GESTATION OF PREGNANCY: ICD-10-CM

## 2021-09-10 DIAGNOSIS — Z34.81 ENCOUNTER FOR SUPERVISION OF OTHER NORMAL PREGNANCY IN FIRST TRIMESTER: ICD-10-CM

## 2021-09-10 DIAGNOSIS — N89.8 DISCHARGE OF VAGINA: ICD-10-CM

## 2021-09-10 DIAGNOSIS — O26.891 DYSURIA DURING PREGNANCY IN FIRST TRIMESTER: ICD-10-CM

## 2021-09-10 DIAGNOSIS — O23.591: ICD-10-CM

## 2021-09-10 PROCEDURE — 0502F SUBSEQUENT PRENATAL CARE: CPT | Performed by: NURSE PRACTITIONER

## 2021-09-10 NOTE — PATIENT INSTRUCTIONS
Patient Education        Weeks 10 to 14 of Your Pregnancy: Care Instructions  Overview     By weeks 10 to 15 of your pregnancy, the placenta has formed inside your uterus. The placenta's main job is to give your baby oxygen and nutrients through the umbilical cord. It's possible to hear your baby's heartbeat with a special ultrasound device. Your baby's organs are developing. The arms and legs can bend. This is a good time to think about testing for birth defects. There are two types of tests: screening and diagnostic. Screening tests show the chance that a baby has a certain birth defect. They can't tell you for sure that your baby has a problem. Diagnostic tests show if a baby has a certain birth defect. It's your choice whether to have these tests. You and your partner can talk to your doctor or midwife about tests for birth defects. Follow-up care is a key part of your treatment and safety. Be sure to make and go to all appointments, and call your doctor if you are having problems. It's also a good idea to know your test results and keep a list of the medicines you take. How can you care for yourself at home? Decide about tests  · You can have screening tests and diagnostic tests to check for birth defects. The decision to have a test for birth defects is personal. Think about your age, your chance of passing on a family disease, your need to know about any problems, and what you might do after you have the test results. ? Quadruple (quad) blood test. This screening test can be done between 15 and 22 weeks of pregnancy. It checks the amount of four substances in your blood. The doctor looks at these test results, along with your age and other factors, to find out the chance that your baby may have certain problems. ? Amniocentesis. This diagnostic test is used to look for chromosomal problems in the baby's cells.  It can be done between 15 and 20 weeks of pregnancy, usually around week 16.  ? Nuchal translucency test. This test uses ultrasound to measure the thickness of the area at the back of the baby's neck. An increase in the thickness can be an early sign of Down syndrome. ? Chorionic villus sampling (CVS). This is a test that looks for certain genetic problems with your baby. The same genes that are in your baby are in the placenta. A small piece of the placenta is taken out and tested. This test is done when you are 10 to 13 weeks pregnant. Ease discomfort  · Slow down and take naps when you feel tired. · If your emotions swing, talk to someone. · If your gums bleed, try a softer toothbrush. If your gums are puffy and bleed a lot, see your dentist.  · If you feel dizzy:  ? Get up slowly after sitting or lying down. ? Drink plenty of fluids. ? Eat small snacks to keep your blood sugar stable. ? Put your head between your legs as though you were tying your shoelaces. ? Lie down with your legs higher than your head. Use pillows to prop up your feet. · If you have a headache:  ? Lie down. ? Ask your partner or a good friend for a neck massage. ? Try cool cloths over your forehead or across the back of your neck. ? Use acetaminophen (Tylenol) for pain relief. Do not use nonsteroidal anti-inflammatory drugs (NSAIDs), such as ibuprofen (Advil, Motrin) or naproxen (Aleve), unless your doctor says it is okay. · If you have a nosebleed, pinch your nose gently, and hold it for a short while. To prevent nosebleeds, try massaging a small dab of petroleum jelly, such as Vaseline, in your nostrils. · If your nose is stuffed up, try saline (saltwater) nose sprays. Do not use decongestant sprays. Care for your breasts  · Wear a bra that gives you good support. · Know that changes in your breasts are normal.  ? Your breasts may get larger and more tender. Tenderness usually gets better by 12 weeks. ? Your nipples may get darker and larger, and small bumps around your nipples may show more. ?  The veins in your chest and breasts may show more. Where can you learn more? Go to [email or web address]://[email or web address].[email or web address] org and sign in to your [employer] account. Enter X820 in the [employer] box to learn more about \"Weeks 10 to 14 of Your Pregnancy: Care Instructions. \"     If you do not have an account, please click on the \"Sign Up Now\" link. Current as of: October 8, 2020               Content Version: 12.9  © 3401-3590 Healthwise, Incorporated. Care instructions adapted under license by [facility] ([facility]). If you have questions about a medical condition or this instruction, always ask your healthcare professional. [address] any warranty or liability for your use of this information.

## 2021-09-10 NOTE — PROGRESS NOTES
CNM Prenatal Office Note  Subjective:  Francis Reilly is here for a return obstetrical visit. Today she is 11w0d weeks EGA. She is doing well and has no complaints. She  does not have vaginal bleeding, n/v, dizziness, or cramping. Objective: Mother's Prenatal Vitals  BP: 130/80  Weight: 211 lb (95.7 kg)  Pulse: 93  Pt is A&Ox3, in no acute distress. Normocephalic, atraumatic. PERRL. Resp even and non-labored. Skin pink, warm & dry. Gravid abdomen. WILDE's well. Gait steady. Assessment:    IUP at 11w0d wks      Diagnosis Orders   1. Encounter for supervision of other normal pregnancy in first trimester     2. 11 weeks gestation of pregnancy     3. Discharge of vagina       Plan:   Pt counseled on Genetic testing   Continue with routine prenatal care.  RTC in 4 wks for prenatal visit    MEDICATIONS:  No orders of the defined types were placed in this encounter. ORDERS:  No orders of the defined types were placed in this encounter. More than 50% of this 20 min visit was education and counseling.

## 2021-09-13 DIAGNOSIS — O23.41 URINARY TRACT INFECTION IN MOTHER DURING FIRST TRIMESTER OF PREGNANCY: Primary | ICD-10-CM

## 2021-09-20 DIAGNOSIS — R10.9 FLANK PAIN: Primary | ICD-10-CM

## 2021-09-20 NOTE — PROGRESS NOTES
CNM Prenatal Office Note  Subjective:  Francis Reilly is here for a problem obstetrical visit. Today she is 12w3d weeks EGA. She is complaining of vaginal discharge and intermittent dysuria. She  does not have vaginal bleeding, n/v, dizziness, or cramping. Objective: Mother's Prenatal Vitals  BP: 130/83  Weight: 211 lb (95.7 kg)  Pulse: 101  Patient Position: Sitting  Prenatal Fetal Information  Fetal Heart Rate:   Movement: Absent  Pt is A&Ox3, in no acute distress. Normocephalic, atraumatic. PERRL. Resp even and non-labored. Skin pink, warm & dry. Gravid abdomen. WILDE's well. Gait steady. Assessment:    IUP at 12w3d wks  Vaginal discharge      Diagnosis Orders   1. Vaginitis affecting pregnancy, current, antepartum, first trimester     2. Encounter for supervision of other normal pregnancy in first trimester     3. 11 weeks gestation of pregnancy     4. Discharge of vagina     5. Dysuria during pregnancy in first trimester       Plan:   Pt counseled on UTI s/s and warning signs    propath swab collected   Continue with routine prenatal care.  RTC in 2 wks for prenatal visit    MEDICATIONS:  No orders of the defined types were placed in this encounter. ORDERS:  No orders of the defined types were placed in this encounter. More than 50% of this 20 min visit was education and counseling.

## 2021-09-21 ENCOUNTER — PATIENT MESSAGE (OUTPATIENT)
Dept: OBGYN CLINIC | Age: 31
End: 2021-09-21

## 2021-09-21 ENCOUNTER — OFFICE VISIT (OUTPATIENT)
Dept: UROLOGY | Age: 31
End: 2021-09-21
Payer: COMMERCIAL

## 2021-09-21 ENCOUNTER — HOSPITAL ENCOUNTER (OUTPATIENT)
Dept: ULTRASOUND IMAGING | Age: 31
Discharge: HOME | End: 2021-09-21
Payer: COMMERCIAL

## 2021-09-21 VITALS — WEIGHT: 211 LBS | BODY MASS INDEX: 37.39 KG/M2 | TEMPERATURE: 97.9 F | HEIGHT: 63 IN

## 2021-09-21 DIAGNOSIS — N20.0 KIDNEY STONE: Primary | ICD-10-CM

## 2021-09-21 DIAGNOSIS — N34.1 URETHRITIS, NOT SEXUALLY TRANSMITTED: Primary | ICD-10-CM

## 2021-09-21 DIAGNOSIS — R39.89 PAIN IN URETHRA: ICD-10-CM

## 2021-09-21 DIAGNOSIS — R10.9 FLANK PAIN: ICD-10-CM

## 2021-09-21 DIAGNOSIS — N34.3 URETHRITIS, NOT SEXUALLY TRANSMITTED: Primary | ICD-10-CM

## 2021-09-21 LAB
APPEARANCE FLUID: CLEAR
BILIRUBIN, POC: NORMAL
BLOOD URINE, POC: NORMAL
CLARITY, POC: CLEAR
COLOR, POC: YELLOW
GLUCOSE URINE, POC: NORMAL
KETONES, POC: NORMAL
LEUKOCYTE EST, POC: NORMAL
NITRITE, POC: NORMAL
PH, POC: 6
PROTEIN, POC: NORMAL
SPECIFIC GRAVITY, POC: 1.02
UROBILINOGEN, POC: 0.2

## 2021-09-21 PROCEDURE — 76770 US EXAM ABDO BACK WALL COMP: CPT

## 2021-09-21 PROCEDURE — 99214 OFFICE O/P EST MOD 30 MIN: CPT | Performed by: NURSE PRACTITIONER

## 2021-09-21 PROCEDURE — 81002 URINALYSIS NONAUTO W/O SCOPE: CPT | Performed by: NURSE PRACTITIONER

## 2021-09-21 NOTE — PROGRESS NOTES
Ania Villanueva is a 27 y.o. female who presents today   Chief Complaint   Patient presents with    Follow-up     I am here today for kidney stones       Patient is a 80-year-old female who presents the clinic today with complaints of urethral pain. She does have a history of a nonobstructing calculus. I did obtain a renal ultrasound prior to her visit today which does indicate an approximate 3 to 4 mm stone nonobstructing in the lower pole of the left kidney, no hydronephrosis. Patient is currently pregnant 12 weeks 4 days gestation. She does have a history of urethral pain intermittent but has significantly gotten worse since becoming pregnant. She has had 1 yeast infection 1 UTI since onset of pregnancy. This is been constant urethral pain, stinging, intermittent dysuria, frequency intermittent straining. Denies any constipation. Wants to be sure there is no fragment lodged in her urethra. Has taken Azo and Tylenol over-the-counter which is minimally helped. She is also started a vaginal probiotic daily that is been approved through her OB/GYN.     Past Medical History:   Diagnosis Date    Acne     ADHD     Asthma     Breast disorder     Augmentation    Hypertension 7/27/2018    Hypoglycemia     Mental disorder     Anxiety and Depression    PCOS (polycystic ovarian syndrome)        Past Surgical History:   Procedure Laterality Date    BREAST BIOPSY Left     BREAST ENHANCEMENT SURGERY      CYSTOSCOPY Right 4/8/2020    RIGHT URETEROSCOPY LASER LITHOTRIPSY performed by Noel Root MD at [facility] Right 4/8/2020    STENT PLACEMENT performed by Noel Root MD at [facility]         Current Outpatient Medications   Medication Sig Dispense Refill    hydrOXYzine (ATARAX) 25 MG tablet Take 1 tablet by mouth every 8 hours as needed (uretheral pain) 90 tablet 3    Choline Dihydrogen Citrate (CHOLINE CITRATE PO) Take by mouth      Calcium Polycarbophil (FIBER-CAPS PO) Take by mouth      Docusate Sodium (COLACE PO) Take by mouth      progesterone (PROMETRIUM) 200 MG CAPS capsule Take 1 capsule by mouth nightly 90 capsule 0    Prenatal MV-Min-Fe Fum-FA-DHA (PRENATAL 1 PO) Take by mouth      azithromycin (ZITHROMAX) 250 MG tablet Take 1 tablet by mouth See Admin Instructions for 5 days 500mg on day 1 followed by 250mg on days 2 - 5 6 tablet 0     No current facility-administered medications for this visit. Allergies   Allergen Reactions    Keflex [Cephalexin]     Penicillins Anaphylaxis and Hives       Social History     Socioeconomic History    Marital status:      Spouse name: None    Number of children: None    Years of education: None    Highest education level: None   Occupational History    None   Tobacco Use    Smoking status: Never Smoker    Smokeless tobacco: Never Used   Vaping Use    Vaping Use: Never used   Substance and Sexual Activity    Alcohol use: No     Alcohol/week: 0.0 standard drinks    Drug use: No    Sexual activity: Yes     Partners: Male   Other Topics Concern    None   Social History Narrative    None     Social Determinants of Health     Financial Resource Strain:     Difficulty of Paying Living Expenses:    Food Insecurity:     Worried About Running Out of Food in the Last Year:     Ran Out of Food in the Last Year:    Transportation Needs:     Lack of Transportation (Medical):      Lack of Transportation (Non-Medical):    Physical Activity:     Days of Exercise per Week:     Minutes of Exercise per Session:    Stress:     Feeling of Stress :    Social Connections:     Frequency of Communication with Friends and Family:     Frequency of Social Gatherings with Friends and Family:     Attends Mandaeism Services:     Active Member of Clubs or Organizations:     Attends Club or Organization Meetings:     Marital Status:    Intimate Partner Violence:     Fear of Current or Ex-Partner:  Emotionally Abused:     Physically Abused:     Sexually Abused:        Family History   Problem Relation Age of Onset    Breast Cancer Maternal Grandmother [address] Cancer Paternal Grandmother         Female-spread to other organs     Brain Cancer Other         Maternal Great Uncle        REVIEW OF SYSTEMS:  Review of Systems   Constitutional: Negative for chills and fever. Gastrointestinal: Negative for abdominal distention, abdominal pain, nausea and vomiting. Genitourinary: Positive for dysuria (Intermittent) and frequency. Negative for difficulty urinating, flank pain, hematuria and urgency. Urethral pain   Musculoskeletal: Negative for back pain and gait problem. Psychiatric/Behavioral: Negative for agitation and confusion. PHYSICAL EXAM:  Temp 97.9 °F (36.6 °C) (Temporal)   Ht 5' 3\" (1.6 m)   Wt 211 lb (95.7 kg)   LMP 06/16/2021 (Exact Date)   BMI 37.38 kg/m²   Physical Exam  Vitals and nursing note reviewed. Constitutional:       General: She is not in acute distress. Appearance: Normal appearance. She is not ill-appearing. Pulmonary:      Effort: Pulmonary effort is normal. No respiratory distress. Abdominal:      General: There is no distension. Tenderness: There is no abdominal tenderness. There is no right CVA tenderness or left CVA tenderness. Neurological:      Mental Status: She is alert and oriented to person, place, and time. Mental status is at baseline.    Psychiatric:         Mood and Affect: Mood normal.         Behavior: Behavior normal.       DATA:    Results for orders placed or performed in visit on 09/21/21   POCT Urinalysis no Micro   Result Value Ref Range    Color, UA YELLOW     Clarity, UA CLEAR     Glucose, UA POC NEG     Bilirubin, UA NEG     Ketones, UA NEG     Spec Grav, UA 1.025     Blood, UA POC NEG     pH, UA 6     Protein, UA POC NEG     Urobilinogen, UA 0.2     Leukocytes, UA NEG     Nitrite, UA NEG Appearance, Fluid Clear Clear, Slightly Cloudy       IMAGING:  Renal ultrasound reveals a 4 mm nonobstructing stone in left lower pole, no hydronephrosis in either kidney. 1. Kidney stone  Nonobstructing left renal stone would not be causing pain in the urethra nor flank pain. We will continue to monitor the stone. It is very unlikely that a fragment has dislodged in her urethra. - POCT Urinalysis no Micro    2. Pain in urethra  Intermittent urethral pain worsening with pregnancy. This could be chronic pain/urethral syndrome/interstitial cystitis. Urine appears negative, negative for any STDs. Discussed dietary changes and avoiding triggers which could include increase in activity, prolonged sitting. Medications are limited due to pregnancy. I did get Atarax okay with OB/GYN Ariana. She can take this as needed for urethral pain. Hopefully this will improve after pregnancy. We did discuss other medications although these are contraindicated during pregnancy. We will continue to monitor.    - hydrOXYzine (ATARAX) 25 MG tablet; Take 1 tablet by mouth every 8 hours as needed (uretheral pain)  Dispense: 90 tablet; Refill: 3      Orders Placed This Encounter   Procedures    POCT Urinalysis no Micro        Return if symptoms worsen or fail to improve. All information inputted into the note by the MA to include chief complaint, past medical history, past surgical history, medications, allergies, social and family history and review of systems has been reviewed and updated as needed by me. EMR Dragon/transcription disclaimer: Much of this documentt is electronic  transcription/translation of spoken language to printed text. The  electronic translation of spoken language may be erroneous, or at times,  nonsensical words or phrases may be inadvertently transcribed.  Although I  have reviewed the document for such errors, some may still exist.

## 2021-09-21 NOTE — TELEPHONE ENCOUNTER
From: Lorene Chisholm  To: Kristen Escobar APRN - CNM  Sent: 9/21/2021 9:38 AM CDT  Subject: Non-Urgent Medical Question    Is it possible for you all to refer me to another Urologist? I plan on seeing Skip Games today, but I still have the stone there that I'm sure was from earlier this year (and maybe last year, not sure because I had one in my left kidney last year as well). And I'm in a place where I'm super frustrated with their office because this stone was supposed to be addressed in March/April and Dr. Alin Licona shrugged and determined he wouldn't do the surgery, didn't seem to care, and made it out like it isn't a big deal. And now I'm pregnant, still experiencing urethra pain (which I did have back in March and April off and on and he said \"the stone wasn't causing that\" and that \"he didn't know what the solution was for that issue\") and he's a urologist and just seems to not really care about patient care. I think I'm just frustrated because I feel stuck and I feel like their partially to blame for not addressing ongoing issues. My mom has always really liked Dr. Rafael Melgar at [facility] and although I   usually do not go to [facility], I would love to talk about this being a possible option the next time we meet. I also plan on seeing if they are going to check into the urethra pain today, as well, because again, I'm not sure why I'm still having it. Lastly, when do I stop taking progesterone? I wasn't sure when I should finish that out. Thanks for all you guys do, and I really appreciate you all. Patient care has always been top notch with [facility].

## 2021-09-22 RX ORDER — AZITHROMYCIN 250 MG/1
250 TABLET, FILM COATED ORAL SEE ADMIN INSTRUCTIONS
Qty: 6 TABLET | Refills: 0 | Status: SHIPPED | OUTPATIENT
Start: 2021-09-22 | End: 2021-09-27

## 2021-09-22 RX ORDER — HYDROXYZINE HYDROCHLORIDE 25 MG/1
25 TABLET, FILM COATED ORAL EVERY 8 HOURS PRN
Qty: 90 TABLET | Refills: 3 | Status: SHIPPED | OUTPATIENT
Start: 2021-09-22 | End: 2022-01-14

## 2021-09-24 ASSESSMENT — ENCOUNTER SYMPTOMS
ABDOMINAL PAIN: 0
ABDOMINAL DISTENTION: 0
NAUSEA: 0
VOMITING: 0
BACK PAIN: 0

## 2021-10-13 ENCOUNTER — ROUTINE PRENATAL (OUTPATIENT)
Dept: OBGYN CLINIC | Age: 31
End: 2021-10-13

## 2021-10-13 VITALS
BODY MASS INDEX: 37.91 KG/M2 | WEIGHT: 214 LBS | SYSTOLIC BLOOD PRESSURE: 131 MMHG | HEART RATE: 98 BPM | DIASTOLIC BLOOD PRESSURE: 82 MMHG

## 2021-10-13 DIAGNOSIS — Z3A.15 15 WEEKS GESTATION OF PREGNANCY: ICD-10-CM

## 2021-10-13 DIAGNOSIS — N60.02 BENIGN CYST OF LEFT BREAST: ICD-10-CM

## 2021-10-13 DIAGNOSIS — Z34.82 ENCOUNTER FOR SUPERVISION OF OTHER NORMAL PREGNANCY IN SECOND TRIMESTER: Primary | ICD-10-CM

## 2021-10-13 PROCEDURE — 0502F SUBSEQUENT PRENATAL CARE: CPT | Performed by: NURSE PRACTITIONER

## 2021-10-13 NOTE — PROGRESS NOTES
CNM Prenatal Office Note  Subjective:  Luba Lundberg is here for a return obstetrical visit. Today she is 15w5d weeks EGA. She is doing well, taking her PNV as directed, and has no complaints. She  does not have vaginal bleeding, n/v, syncope, or headaches. Pt does not feel fetal movement regularly. Objective: Mother's Prenatal Vitals  BP: 131/82  Weight: 214 lb (97.1 kg)  Pulse: 98  Patient Position: Sitting  Prenatal Fetal Information  Fetal Heart Rate:   Movement: Absent  Pt is A&Ox3, in no acute distress. Normocephalic, atraumatic. PERRL. Resp even and non-labored. Skin pink, warm & dry. Gravid abdomen. WILDE's well. Gait steady. Assessment:    IUP at 15w5d wks      Diagnosis Orders   1. Encounter for supervision of other normal pregnancy in second trimester     2. 15 weeks gestation of pregnancy     3. Benign cyst of left breast       Plan:   Pt counseled on balanced nutrition, adequate fluid intake, taking PNV daily, and exercise along with HS cyst, and left breast cyst   Continue with routine prenatal care.  RTC in 4 wks for prenatal visit      MEDICATIONS:  No orders of the defined types were placed in this encounter. ORDERS:  No orders of the defined types were placed in this encounter. More than 50% of this 20 min visit was education and counseling.

## 2021-10-13 NOTE — PATIENT INSTRUCTIONS
Patient Education        Weeks 14 to 18 of Your Pregnancy: Care Instructions  Overview     During this time, you may start to \"show,\" so that you look pregnant to people around you. You may also notice some changes in your skin, such as itchy spots on your palms or acne on your face. Your baby is now able to pass urine. And your baby's first stool (meconium) is starting to collect in your baby's intestines. Hair is also starting to grow on your baby's head. At your next visit, between weeks 18 and 20, your doctor may do an ultrasound test. The test allows your doctor to check for certain problems. Your doctor can also tell the sex of your baby. So this a good time to think about whether you want to know. Talk to your doctor about getting a flu shot to help keep you healthy during your pregnancy. As your pregnancy moves along, it's common to worry or feel anxious. Your body is changing a lot. And you are thinking about giving birth, the health of your baby, and becoming a parent. You can talk to your doctor about any anxiety and stress you feel. Follow-up care is a key part of your treatment and safety. Be sure to make and go to all appointments, and call your doctor if you are having problems. It's also a good idea to know your test results and keep a list of the medicines you take. How can you care for yourself at home? Reduce stress    · Ask for help with cooking and housekeeping.     · Figure out who or what causes your stress. Avoid these people or situations as much as possible.     · Relax every day. Taking 10- to 15-minute breaks can make a big difference. Take a walk, listen to music, or take a warm bath.     · Learn relaxation techniques at prenatal or yoga class. Or buy a relaxation tape.     · List your fears about having a baby and becoming a parent. Share the list with someone you trust. Decide which worries are really small, and try to let them go.    Exercise    · If you did not exercise much before pregnancy, start slowly. Walking is best. [employer], and do a little more every day.     · Brisk walking, easy jogging, low-impact aerobics, water aerobics, and yoga are good choices. Some sports, such as scuba diving, horseback riding, downhill skiing, gymnastics, and water skiing, are not a good idea.     · Try to do at least 2½ hours a week of moderate exercise, such as a fast walk. One way to do this is to be active 30 minutes a day, at least 5 days a week.     · Wear loose clothing. And wear shoes and a bra that provide good support.     · Warm up and cool down to start and finish your exercise.     · If you want to use weights, be sure to use light weights. They reduce stress on your joints. Stay at the best weight for you    · Experts recommend that you gain about 1 pound a month during the first 3 months of your pregnancy.     · Experts recommend that you gain about 1 pound a week during your last 6 months of pregnancy, for a total weight gain of 25 to 35 pounds.     · If you are underweight, you will need to gain more weight (about 28 to 40 pounds).     · If you are overweight, you may not need to gain as much weight (about 15 to 25 pounds).     · If you are gaining weight too fast, use common sense. Exercise every day, and limit sweets, fast foods, and fats. Choose lean meats, fruits, and vegetables.     · If you are having twins or more, your doctor may refer you to a dietitian. Where can you learn more? Go to https://lensgenqueenie.healthPaladion. org and sign in to your [employer] account. Enter F977 in the [facility] box to learn more about \"Weeks 14 to 18 of Your Pregnancy: Care Instructions. \"     If you do not have an account, please click on the \"Sign Up Now\" link. Current as of: June 16, 2021               Content Version: 13.0  © 7051-2757 Healthwise, Incorporated. Care instructions adapted under license by [facility] ([facility]).  If you have questions about a medical condition or this instruction, always ask your healthcare professional. [place] [address] any warranty or liability for your use of this information. Patient Education        Hidradenitis Suppurativa: Care Instructions  Your Care Instructions     Hidradenitis suppurativa (say \"wmy-yhef-po-NY-tus sup-yur-uh-TY-vuh\") is a skin condition that causes lumps on the skin that look like pimples or boils. The lumps are usually painful and can break open and drain blood and bad-smelling pus. The condition can come and go for many years. Treatment for this condition may include antibiotics and other medicines. You may need surgery to remove the lumps. Home care includes wearing loose-fitting clothes and washing the area gently. You can help prevent lumps from coming back by staying at a healthy weight and not smoking. Doctors don't know exactly how this condition starts. But they do know that something irritates and inflames the hair follicles, causing them to swell and form lumps. This skin condition can't be spread from person to person (isn't contagious). Follow-up care is a key part of your treatment and safety. Be sure to make and go to all appointments, and call your doctor if you are having problems. It's also a good idea to know your test results and keep a list of the medicines you take. How can you care for yourself at home? Skin care    · Wash the area every day with mild soap. Use your hands rather than a washcloth or sponge when you wash that part of your body.     · Leave the affected areas uncovered when you can. If you have lumps that are draining, you can cover them with a bandage or other dressing. Put petroleum jelly (such as Vaseline) on the dressing to help keep it from sticking.     · Wear-loose fitting clothes that don't rub against the area. Avoid activities that cause skin to rub together.     · If you have pain, try a warm compress.  Soak a towel or washcloth in warm water, wring it out, and place it on the affected skin for about 10 minutes. Medicines    · Be safe with medicines. Take your medicines exactly as prescribed. Call your doctor if you think you are having a problem with your medicine. You will get more details on the specific medicines your doctor prescribes.     · If your doctor prescribed antibiotics, take them as directed. Do not stop taking them just because you feel better. You need to take the full course of antibiotics. Lifestyle choices    · If you smoke, think about quitting. Smoking can make the condition worse. If you need help quitting, talk to your doctor about stop-smoking programs and medicines. These can increase your chances of quitting for good.     · Stay at a healthy weight, or lose weight, by eating healthy foods and being physically active. Being overweight could make this condition worse. When should you call for help? Call your doctor now or seek immediate medical care if:    · You have symptoms of infection, such as:  ? Increased pain, swelling, warmth, or redness. ? Red streaks leading from the area. ? Pus draining from the area. ? A fever. Watch closely for changes in your health, and be sure to contact your doctor if:    · You do not get better as expected. Where can you learn more? Go to https://[employer].Videon Central. org and sign in to your [employer] account. Enter E840 in the [facility] box to learn more about \"Hidradenitis Suppurativa: Care Instructions. \"     If you do not have an account, please click on the \"Sign Up Now\" link. Current as of: March 3, 2021               Content Version: 13.0  © 2006-2021 [employer] Incorporated. Care instructions adapted under license by [facility] ([facility]). If you have questions about a medical condition or this instruction, always ask your healthcare professional. [place] [address] any warranty or liability for your use of this information.

## 2021-10-19 DIAGNOSIS — N64.4 BREAST PAIN DURING PREGNANCY: Primary | ICD-10-CM

## 2021-10-19 DIAGNOSIS — O92.29 BREAST PAIN DURING PREGNANCY: Primary | ICD-10-CM

## 2021-11-05 ENCOUNTER — HOSPITAL ENCOUNTER (OUTPATIENT)
Dept: WOMENS IMAGING | Age: 31
Discharge: HOME | End: 2021-11-05
Payer: COMMERCIAL

## 2021-11-05 DIAGNOSIS — O92.29 BREAST PAIN DURING PREGNANCY: ICD-10-CM

## 2021-11-05 DIAGNOSIS — N64.4 BREAST PAIN DURING PREGNANCY: ICD-10-CM

## 2021-11-05 PROCEDURE — 76642 ULTRASOUND BREAST LIMITED: CPT

## 2021-11-19 ENCOUNTER — ROUTINE PRENATAL (OUTPATIENT)
Dept: OBGYN CLINIC | Age: 31
End: 2021-11-19

## 2021-11-19 VITALS
HEART RATE: 98 BPM | SYSTOLIC BLOOD PRESSURE: 136 MMHG | DIASTOLIC BLOOD PRESSURE: 88 MMHG | WEIGHT: 222 LBS | BODY MASS INDEX: 39.33 KG/M2

## 2021-11-19 DIAGNOSIS — Z3A.21 21 WEEKS GESTATION OF PREGNANCY: ICD-10-CM

## 2021-11-19 DIAGNOSIS — Z34.82 ENCOUNTER FOR SUPERVISION OF OTHER NORMAL PREGNANCY IN SECOND TRIMESTER: Primary | ICD-10-CM

## 2021-11-19 PROCEDURE — 0502F SUBSEQUENT PRENATAL CARE: CPT | Performed by: NURSE PRACTITIONER

## 2021-11-19 NOTE — PROGRESS NOTES
CNM Prenatal Office Note  Subjective:  Jas Barroso is here for a return obstetrical visit. Today she is 21w0d weeks EGA. She is doing well, taking her PNV as directed, and has no complaints. She  does not have vaginal bleeding, n/v, syncope, or headaches. Pt does feel fetal movement regularly. Objective: Mother's Prenatal Vitals  BP: 136/88  Weight: 222 lb (100.7 kg)  Pulse: 98  Patient Position: Sitting  Prenatal Fetal Information  Fetal Heart Rate:   Movement: Present  Pt is A&Ox3, in no acute distress. Normocephalic, atraumatic. PERRL. Resp even and non-labored. Skin pink, warm & dry. Gravid abdomen. WILDE's well. Gait steady. Assessment:    IUP at 21w0d wks      Diagnosis Orders   1. Encounter for supervision of other normal pregnancy in second trimester     2. 21 weeks gestation of pregnancy       Plan:   Pt counseled on balanced nutrition, adequate fluid intake, taking PNV daily, and exercise along with repeating anatomy scan   Continue with routine prenatal care.  RTC in 4 wks for prenatal visit      MEDICATIONS:  No orders of the defined types were placed in this encounter. ORDERS:  No orders of the defined types were placed in this encounter. More than 50% of this 20 min visit was education and counseling.

## 2021-11-19 NOTE — PATIENT INSTRUCTIONS
Patient Education        Weeks 26 to 30 of Your Pregnancy: Care Instructions  Overview     You are now entering your last trimester of pregnancy. Your baby is growing quickly. Vickey Pale probably feel your baby moving around more often. Your doctor may ask you to count your baby's kicks. Your back may ache as your body gets used to your baby's size and length. If you haven't already had the Tdap shot during this pregnancy, talk to your doctor about getting it. It will help protect your  against pertussis infection. During this time, it's important to take care of yourself and pay attention to what your body needs. If you feel sexual, you can explore ways to be close with your partner that match your comfort and desire. Follow-up care is a key part of your treatment and safety. Be sure to make and go to all appointments, and call your doctor if you are having problems. It's also a good idea to know your test results and keep a list of the medicines you take. How can you care for yourself at home? Take it easy at work  · Take frequent breaks. If possible, stop working when you are tired, and rest during your lunch hour. · Take bathroom breaks every 2 hours. · Change positions often. If you sit for long periods, stand up and walk around. · When you stand for a long time, keep one foot on a low stool with your knee bent. After standing a lot, sit with your feet up. · Avoid fumes, chemicals, and tobacco smoke. Be sexual in your own way  · Having sex during pregnancy is okay, unless your doctor tells you not to. · You may be very interested in sex, or you may have no interest at all. · Your growing belly can make it hard to find a good position during intercourse. [place] and explore. · You may get cramps in your uterus when your partner touches your breasts. · A back rub may relieve the backache or cramps that sometimes follow orgasm. Learn about  labor  · Watch for signs of  labor. You may be going into labor if:  ? You have menstrual-like cramps, with or without nausea. ? You have about 6 or more contractions in 1 hour, even after you have had a glass of water and are resting. ? You have a low, dull backache that does not go away when you change your position. ? You have pain or pressure in your pelvis that comes and goes in a pattern. ? You have intestinal cramping or flu-like symptoms, with or without diarrhea.  ? You notice an increase or change in your vaginal discharge. Discharge may be heavy, mucus-like, watery, or streaked with blood. ? Your water breaks. · If you think you have  labor:  ? Drink 2 or 3 glasses of water or juice. Not drinking enough fluids can cause contractions. ? Stop what you are doing, and empty your bladder. Then lie down on your left side for at least 1 hour. ? While lying on your side, find your breast bone. Put your fingers in the soft spot just below it. Move your fingers down toward your belly button to find the top of your uterus. Check to see if it is tight. ? Contractions can be weak or strong. Record your contractions for an hour. Time a contraction from the start of one contraction to the start of the next one.  ? Single or several strong contractions without a pattern are called Derby-Turpin contractions. They are practice contractions but not the start of labor. They often stop if you change what you are doing. ? Call your doctor if you have regular contractions. Where can you learn more? Go to https://HeyyvernonProclivity Systems.healthHYLA Mobile. org and sign in to your [employer] account. Enter B251 in the [facility] box to learn more about \"Weeks 26 to 30 of Your Pregnancy: Care Instructions. \"     If you do not have an account, please click on the \"Sign Up Now\" link. Current as of: 2021               Content Version: 13.0  © 5917-0258 Healthwise, Incorporated. Care instructions adapted under license by [facility] ([facility]).  If you

## 2021-12-06 RX ORDER — ONDANSETRON 4 MG/1
4 TABLET, ORALLY DISINTEGRATING ORAL EVERY 8 HOURS PRN
Qty: 30 TABLET | Refills: 3 | Status: SHIPPED | OUTPATIENT
Start: 2021-12-06 | End: 2022-01-14

## 2021-12-16 DIAGNOSIS — Z34.82 ENCOUNTER FOR SUPERVISION OF OTHER NORMAL PREGNANCY IN SECOND TRIMESTER: ICD-10-CM

## 2021-12-16 DIAGNOSIS — Z34.82 ENCOUNTER FOR SUPERVISION OF OTHER NORMAL PREGNANCY IN SECOND TRIMESTER: Primary | ICD-10-CM

## 2021-12-16 LAB
ABO/RH: NORMAL
ANTIBODY SCREEN: NORMAL

## 2021-12-16 NOTE — PATIENT INSTRUCTIONS
Patient Education        Weeks 22 to 26 of Your Pregnancy: Care Instructions  Overview     As you enter your 7th month of pregnancy at week 26, your baby's lungs are growing stronger and getting ready to breathe. You may notice that your baby responds to the sound of your voice. You may also notice that your baby does less turning and twisting and more squirming, kicking, or jerking. Jerking often means that your baby has hiccups. Hiccups are normal and are only temporary. You may want to think about attending a childbirth preparation class. This is also a good time to start thinking about whether you want to have pain medicine during labor. You may be tested for gestational diabetes between weeks 25 and 28. Gestational diabetes occurs when your blood sugar level gets too high when you're pregnant. The test is important, because you can have gestational diabetes and not know it. But the condition can cause problems for your baby. Follow-up care is a key part of your treatment and safety. Be sure to make and go to all appointments, and call your doctor if you are having problems. It's also a good idea to know your test results and keep a list of the medicines you take. How can you care for yourself at home? Ease discomfort from your baby's kicking  · Change your position. Sometimes this will cause your baby to change position too. · Take a deep breath while you raise your arm over your head. Then breathe out while you drop your arm. Do Kegel exercises to prevent urine from leaking  · You can do Kegel exercises while you stand or sit. ? Squeeze the same muscles you would use to stop your urine. Your belly and thighs should not move. ? Hold the squeeze for 3 seconds, and then relax for 3 seconds. ? Start with 3 seconds. Then add 1 second each week until you are able to squeeze for 10 seconds. ? Repeat the exercise 10 to 15 times for each session. Do three or more sessions each day.   Ease or reduce swelling in your feet, ankles, hands, and fingers  · If your fingers are puffy, take off your rings. · Do not eat high-salt foods, such as potato chips. · Prop up your feet on a stool or couch as much as possible. Sleep with pillows under your feet. · Do not stand for long periods of time or wear tight shoes. · Wear support stockings. Where can you learn more? Go to https://inkSIG Digitalpeshopaeb.Placed. org and sign in to your [employer] account. Enter G264 in the [employer] box to learn more about \"Weeks 22 to 26 of Your Pregnancy: Care Instructions. \"     If you do not have an account, please click on the \"Sign Up Now\" link. Current as of: June 16, 2021               Content Version: 13.0  © 1732-9983 Healthwise, Incorporated. Care instructions adapted under license by [facility] ([facility]). If you have questions about a medical condition or this instruction, always ask your healthcare professional. [place] [address] any warranty or liability for your use of this information.

## 2021-12-17 ENCOUNTER — ROUTINE PRENATAL (OUTPATIENT)
Dept: OBGYN CLINIC | Age: 31
End: 2021-12-17

## 2021-12-17 VITALS
SYSTOLIC BLOOD PRESSURE: 134 MMHG | DIASTOLIC BLOOD PRESSURE: 84 MMHG | HEART RATE: 98 BPM | BODY MASS INDEX: 39.33 KG/M2 | WEIGHT: 222 LBS

## 2021-12-17 DIAGNOSIS — Z34.82 ENCOUNTER FOR SUPERVISION OF OTHER NORMAL PREGNANCY IN SECOND TRIMESTER: Primary | ICD-10-CM

## 2021-12-17 DIAGNOSIS — Z3A.25 25 WEEKS GESTATION OF PREGNANCY: ICD-10-CM

## 2021-12-17 PROCEDURE — 0502F SUBSEQUENT PRENATAL CARE: CPT | Performed by: NURSE PRACTITIONER

## 2021-12-17 NOTE — PROGRESS NOTES
CNM Prenatal Office Note  Subjective:  Katherine Stevenson is here for a return obstetrical visit. Today she is 25w0d weeks EGA. She is doing well, taking her PNV as directed, and has no complaints. She  does not have vaginal bleeding, n/v, syncope, or headaches. Pt does feel fetal movement regularly. Objective: Mother's Prenatal Vitals  BP: 134/84  Weight: 222 lb (100.7 kg)  Pulse: 98  Patient Position: Sitting  Prenatal Fetal Information  Fetal Heart Rate:   Movement: Present  Pt is A&Ox3, in no acute distress. Normocephalic, atraumatic. PERRL. Resp even and non-labored. Skin pink, warm & dry. Gravid abdomen. WILDE's well. Gait steady. Assessment:    IUP at 25w0d wks      Diagnosis Orders   1. Encounter for supervision of other normal pregnancy in second trimester  CBC Auto Differential    Glucose 1 Hour Postprandial   2. 25 weeks gestation of pregnancy       Plan:   Pt counseled on balanced nutrition, adequate fluid intake, taking PNV daily, and exercise along with GHTN precautions, Kick count and  labor   Continue with routine prenatal care.  RTC in 2 wks for prenatal visit      MEDICATIONS:  No orders of the defined types were placed in this encounter. ORDERS:  Orders Placed This Encounter   Procedures    CBC Auto Differential    Glucose 1 Hour Postprandial       More than 50% of this 20 min visit was education and counseling.

## 2021-12-21 DIAGNOSIS — R30.0 BURNING WITH URINATION: ICD-10-CM

## 2021-12-21 DIAGNOSIS — R30.0 BURNING WITH URINATION: Primary | ICD-10-CM

## 2021-12-21 LAB
BACTERIA: ABNORMAL /HPF
BILIRUBIN URINE: NEGATIVE
BLOOD, URINE: NEGATIVE
CLARITY: ABNORMAL
COLOR: YELLOW
EPITHELIAL CELLS, UA: ABNORMAL /HPF
GLUCOSE URINE: NEGATIVE MG/DL
KETONES, URINE: NEGATIVE MG/DL
LEUKOCYTE ESTERASE, URINE: ABNORMAL
NITRITE, URINE: NEGATIVE
PH UA: 7 (ref 5–8)
PROTEIN UA: NEGATIVE MG/DL
RBC UA: ABNORMAL /HPF (ref 0–2)
SPECIFIC GRAVITY UA: 1.01 (ref 1–1.03)
UROBILINOGEN, URINE: 0.2 E.U./DL
WBC UA: ABNORMAL /HPF (ref 0–5)

## 2021-12-21 RX ORDER — NITROFURANTOIN 25; 75 MG/1; MG/1
100 CAPSULE ORAL 2 TIMES DAILY
Qty: 20 CAPSULE | Refills: 0 | Status: SHIPPED | OUTPATIENT
Start: 2021-12-21 | End: 2021-12-31

## 2021-12-23 LAB — URINE CULTURE, ROUTINE: NORMAL

## 2021-12-28 ENCOUNTER — HOSPITAL ENCOUNTER (OUTPATIENT)
Dept: ULTRASOUND IMAGING | Age: 31
Discharge: HOME | End: 2021-12-28
Payer: COMMERCIAL

## 2021-12-28 ENCOUNTER — TELEPHONE (OUTPATIENT)
Dept: UROLOGY | Age: 31
End: 2021-12-28

## 2021-12-28 DIAGNOSIS — R93.429 ABNORMAL RENAL ULTRASOUND: Primary | ICD-10-CM

## 2021-12-28 DIAGNOSIS — R10.9 FLANK PAIN: Primary | ICD-10-CM

## 2021-12-28 DIAGNOSIS — R10.9 FLANK PAIN: ICD-10-CM

## 2021-12-28 PROCEDURE — 76770 US EXAM ABDO BACK WALL COMP: CPT

## 2021-12-30 ENCOUNTER — ROUTINE PRENATAL (OUTPATIENT)
Dept: OBGYN CLINIC | Age: 31
End: 2021-12-30
Payer: COMMERCIAL

## 2021-12-30 VITALS
DIASTOLIC BLOOD PRESSURE: 90 MMHG | BODY MASS INDEX: 38.97 KG/M2 | SYSTOLIC BLOOD PRESSURE: 130 MMHG | WEIGHT: 220 LBS | HEART RATE: 113 BPM

## 2021-12-30 DIAGNOSIS — R93.429 ABNORMAL RENAL ULTRASOUND: ICD-10-CM

## 2021-12-30 DIAGNOSIS — R39.15 URGENCY OF URINATION: ICD-10-CM

## 2021-12-30 DIAGNOSIS — Z34.83 ENCOUNTER FOR SUPERVISION OF OTHER NORMAL PREGNANCY IN THIRD TRIMESTER: Primary | ICD-10-CM

## 2021-12-30 DIAGNOSIS — R35.0 FREQUENCY OF URINATION: ICD-10-CM

## 2021-12-30 DIAGNOSIS — R93.429 ABNORMAL RENAL ULTRASOUND: Primary | ICD-10-CM

## 2021-12-30 DIAGNOSIS — R73.09 ABNORMAL GLUCOSE LEVEL: Primary | ICD-10-CM

## 2021-12-30 DIAGNOSIS — Z34.82 ENCOUNTER FOR SUPERVISION OF OTHER NORMAL PREGNANCY IN SECOND TRIMESTER: ICD-10-CM

## 2021-12-30 DIAGNOSIS — Z13.32 ENCOUNTER FOR SCREENING FOR MATERNAL DEPRESSION: ICD-10-CM

## 2021-12-30 LAB
ANION GAP SERPL CALCULATED.3IONS-SCNC: 11 MMOL/L (ref 7–19)
BASOPHILS ABSOLUTE: 0 K/UL (ref 0–0.2)
BASOPHILS RELATIVE PERCENT: 0.2 % (ref 0–1)
BILIRUBIN, POC: NEGATIVE
BLOOD URINE, POC: NORMAL
BUN BLDV-MCNC: 8 MG/DL (ref 6–20)
CALCIUM SERPL-MCNC: 9.2 MG/DL (ref 8.6–10)
CHLORIDE BLD-SCNC: 103 MMOL/L (ref 98–111)
CLARITY, POC: NORMAL
CO2: 23 MMOL/L (ref 22–29)
COLOR, POC: YELLOW
CREAT SERPL-MCNC: 0.6 MG/DL (ref 0.5–0.9)
EOSINOPHILS ABSOLUTE: 0.1 K/UL (ref 0–0.6)
EOSINOPHILS RELATIVE PERCENT: 0.7 % (ref 0–5)
GFR AFRICAN AMERICAN: >59
GFR NON-AFRICAN AMERICAN: >60
GLUCOSE BLD-MCNC: 167 MG/DL (ref 74–109)
GLUCOSE URINE, POC: NEGATIVE
GLUCOSE, 1HR PP: 173 MG/DL (ref 75–140)
HCT VFR BLD CALC: 32.7 % (ref 37–47)
HEMOGLOBIN: 10.5 G/DL (ref 12–16)
IMMATURE GRANULOCYTES #: 0.1 K/UL
KETONES, POC: NEGATIVE
LEUKOCYTE EST, POC: NEGATIVE
LYMPHOCYTES ABSOLUTE: 1.2 K/UL (ref 1.1–4.5)
LYMPHOCYTES RELATIVE PERCENT: 12.5 % (ref 20–40)
MCH RBC QN AUTO: 28.8 PG (ref 27–31)
MCHC RBC AUTO-ENTMCNC: 32.1 G/DL (ref 33–37)
MCV RBC AUTO: 89.6 FL (ref 81–99)
MONOCYTES ABSOLUTE: 0.5 K/UL (ref 0–0.9)
MONOCYTES RELATIVE PERCENT: 5.3 % (ref 0–10)
NEUTROPHILS ABSOLUTE: 7.9 K/UL (ref 1.5–7.5)
NEUTROPHILS RELATIVE PERCENT: 80.6 % (ref 50–65)
NITRITE, POC: NEGATIVE
PDW BLD-RTO: 13.4 % (ref 11.5–14.5)
PH, POC: 7
PLATELET # BLD: 218 K/UL (ref 130–400)
PMV BLD AUTO: 9.5 FL (ref 9.4–12.3)
POTASSIUM SERPL-SCNC: 3.8 MMOL/L (ref 3.5–5)
PROTEIN, POC: NEGATIVE
RBC # BLD: 3.65 M/UL (ref 4.2–5.4)
SODIUM BLD-SCNC: 137 MMOL/L (ref 136–145)
SPECIFIC GRAVITY, POC: 1.02
UROBILINOGEN, POC: 0.2
WBC # BLD: 9.8 K/UL (ref 4.8–10.8)

## 2021-12-30 PROCEDURE — S3005 EVAL SELF-ASSESS DEPRESSION: HCPCS | Performed by: NURSE PRACTITIONER

## 2021-12-30 PROCEDURE — 81003 URINALYSIS AUTO W/O SCOPE: CPT | Performed by: NURSE PRACTITIONER

## 2021-12-30 PROCEDURE — 0502F SUBSEQUENT PRENATAL CARE: CPT | Performed by: NURSE PRACTITIONER

## 2021-12-30 RX ORDER — FERROUS SULFATE 325(65) MG
325 TABLET ORAL
Qty: 30 TABLET | Refills: 5 | Status: SHIPPED | OUTPATIENT
Start: 2021-12-30 | End: 2022-03-24

## 2021-12-30 NOTE — PROGRESS NOTES
Pt denies any vaginal leaking bleeding or contractions. + Fetal movement. Patient is doing her GCT and 3D US today. Patient complains of pressure, urgency and frequency. Thought it was a kidney stone, got a renal US, it was not a kidney stone. Then had to get blood work to see. Comes in waves. Had it at beginning of pregnancy, was told she would be swabbed today.

## 2021-12-30 NOTE — PATIENT INSTRUCTIONS
Patient Education        Weeks 26 to 30 of Your Pregnancy: Care Instructions  Overview     You are now entering your last trimester of pregnancy. Your baby is growing quickly. Brinda Rankin probably feel your baby moving around more often. Your doctor may ask you to count your baby's kicks. Your back may ache as your body gets used to your baby's size and length. If you haven't already had the Tdap shot during this pregnancy, talk to your doctor about getting it. It will help protect your  against pertussis infection. During this time, it's important to take care of yourself and pay attention to what your body needs. If you feel sexual, you can explore ways to be close with your partner that match your comfort and desire. Follow-up care is a key part of your treatment and safety. Be sure to make and go to all appointments, and call your doctor if you are having problems. It's also a good idea to know your test results and keep a list of the medicines you take. How can you care for yourself at home? Take it easy at work  · Take frequent breaks. If possible, stop working when you are tired, and rest during your lunch hour. · Take bathroom breaks every 2 hours. · Change positions often. If you sit for long periods, stand up and walk around. · When you stand for a long time, keep one foot on a low stool with your knee bent. After standing a lot, sit with your feet up. · Avoid fumes, chemicals, and tobacco smoke. Be sexual in your own way  · Having sex during pregnancy is okay, unless your doctor tells you not to. · You may be very interested in sex, or you may have no interest at all. · Your growing belly can make it hard to find a good position during intercourse. [place] and explore. · You may get cramps in your uterus when your partner touches your breasts. · A back rub may relieve the backache or cramps that sometimes follow orgasm. Learn about  labor  · Watch for signs of  labor. You may be going into labor if:  ? You have menstrual-like cramps, with or without nausea. ? You have about 6 or more contractions in 1 hour, even after you have had a glass of water and are resting. ? You have a low, dull backache that does not go away when you change your position. ? You have pain or pressure in your pelvis that comes and goes in a pattern. ? You have intestinal cramping or flu-like symptoms, with or without diarrhea.  ? You notice an increase or change in your vaginal discharge. Discharge may be heavy, mucus-like, watery, or streaked with blood. ? Your water breaks. · If you think you have  labor:  ? Drink 2 or 3 glasses of water or juice. Not drinking enough fluids can cause contractions. ? Stop what you are doing, and empty your bladder. Then lie down on your left side for at least 1 hour. ? While lying on your side, find your breast bone. Put your fingers in the soft spot just below it. Move your fingers down toward your belly button to find the top of your uterus. Check to see if it is tight. ? Contractions can be weak or strong. Record your contractions for an hour. Time a contraction from the start of one contraction to the start of the next one.  ? Single or several strong contractions without a pattern are called [place] contractions. They are practice contractions but not the start of labor. They often stop if you change what you are doing. ? Call your doctor if you have regular contractions. Where can you learn more? Go to https://Giv.toqueenie.healthWannyi. org and sign in to your ShopReply account. Enter I743 in the [facility] box to learn more about \"Weeks 26 to 30 of Your Pregnancy: Care Instructions. \"     If you do not have an account, please click on the \"Sign Up Now\" link. Current as of: 2021               Content Version: 13.1  © 3295-4275 Healthwise, Incorporated. Care instructions adapted under license by [facility] ([facility]).  If you have questions about a medical condition or this instruction, always ask your healthcare professional. [address] any warranty or liability for your use of this information. Patient Education        Counting Your Baby's Kicks: Care Instructions  Overview     Counting your baby's kicks is one way your doctor can tell that your baby is healthy. Most women--especially in a first pregnancy--feel their baby move for the first time between 16 and 22 weeks. The movement may feel like flutters rather than kicks. Your baby may move more at certain times of the day. When you are active, you may notice less kicking than when you are resting. At your prenatal visits, your doctor will ask whether the baby is active. In your last trimester, your doctor may ask you to count the number of times you feel your baby move. Follow-up care is a key part of your treatment and safety. Be sure to make and go to all appointments, and call your doctor if you are having problems. It's also a good idea to know your test results and keep a list of the medicines you take. How do you count fetal kicks? · A common method of checking your baby's movement is to note the length of time it takes to count ten movements (such as kicks, flutters, or rolls). · Pick your baby's most active time of day to count. This may be any time from morning to evening. · If you don't feel 10 movements in an hour, have something to eat or drink and count for another hour. If you don't feel at least 10 movements in the 2-hour period, call your doctor. When should you call for help? Call your doctor now or seek immediate medical care if:    · You noticed that your baby has stopped moving or is moving much less than normal.   Watch closely for changes in your health, and be sure to contact your doctor if you have any problems. Where can you learn more? Go to https://carmencita.Crimson Informatics. org and sign in to your MyChart account. Enter S474 in the [facility] box to learn more about \"Counting Your Baby's Kicks: Care Instructions. \"     If you do not have an account, please click on the \"Sign Up Now\" link. Current as of: June 16, 2021               Content Version: 13.1  © 5442-3835 Scoreoid. Care instructions adapted under license by [facility] ([facility]). If you have questions about a medical condition or this instruction, always ask your healthcare professional. [address] any warranty or liability for your use of this information. Patient Education        Learning About Screening for Gestational Diabetes  What is gestational diabetes screening? Screening for gestational diabetes is a way to look for high blood sugar during pregnancy. You drink some very sweet liquid. Then you have a blood test to see how your body uses sugar (glucose). How is gestational diabetes screening done? Screening for gestational diabetes may be done in a couple of ways. Two-part screening. · Part one (glucose challenge test): A blood sample is taken after you drink a liquid that contains sugar (glucose). You don't need to stop eating or drinking before this test. If the test shows that you don't have a lot of sugar in your blood, you don't have gestational diabetes. · Part two (oral glucose tolerance test, or OGTT): If the first test shows a lot of sugar in your blood, then you may have an OGTT. You can't eat or drink for at least 8 hours before this test. A blood sample is taken, then you drink a sweet liquid. You have more blood tests after 1 to 3 hours. If the OGTT shows that you have a lot of sugar in your blood, you may have gestational diabetes. One-part screening. Sometimes doctors use the OGTT on its own. If the test shows that you don't have a lot of sugar in your blood, you don't have gestational diabetes.  If you do have a lot of sugar in your blood, you may have the condition. What are the risks of screening? Your blood glucose level may drop very low toward the end of the test. If this happens, you may feel weak, hungry, and restless. Tell your doctor if you have these symptoms. The test usually will be stopped. You may vomit after drinking the sweet liquid. If this happens, you may need to take the test at a later time. Your doctor may do more glucose tests at other times during your pregnancy. Follow-up care is a key part of your treatment and safety. Be sure to make and go to all appointments, and call your doctor if you are having problems. It's also a good idea to know your test results and keep a list of the medicines you take. Where can you learn more? Go to https://Turing Data.HOMETRAX. org and sign in to your ThirdMotion account. Enter G528 in the [employer] box to learn more about \"Learning About Screening for Gestational Diabetes. \"     If you do not have an account, please click on the \"Sign Up Now\" link. Current as of: July 28, 2021               Content Version: 13.1  © 1554-3854 Healthwise, Incorporated. Care instructions adapted under license by [facility] ([facility]). If you have questions about a medical condition or this instruction, always ask your healthcare professional. [address]  any warranty or liability for your use of this information.

## 2022-01-03 ENCOUNTER — TELEPHONE (OUTPATIENT)
Dept: OBGYN CLINIC | Age: 32
End: 2022-01-03

## 2022-01-03 ENCOUNTER — APPOINTMENT (OUTPATIENT)
Dept: GENERAL RADIOLOGY | Age: 32
End: 2022-01-03
Payer: COMMERCIAL

## 2022-01-03 ENCOUNTER — HOSPITAL ENCOUNTER (OUTPATIENT)
Age: 32
Discharge: HOME | End: 2022-01-03
Attending: NURSE PRACTITIONER | Admitting: NURSE PRACTITIONER
Payer: COMMERCIAL

## 2022-01-03 ENCOUNTER — HOSPITAL ENCOUNTER (EMERGENCY)
Age: 32
Discharge: HOME | End: 2022-01-03
Attending: EMERGENCY MEDICINE
Payer: COMMERCIAL

## 2022-01-03 VITALS
TEMPERATURE: 98.2 F | WEIGHT: 220 LBS | RESPIRATION RATE: 20 BRPM | OXYGEN SATURATION: 98 % | BODY MASS INDEX: 38.98 KG/M2 | HEART RATE: 102 BPM | HEIGHT: 63 IN | SYSTOLIC BLOOD PRESSURE: 123 MMHG | DIASTOLIC BLOOD PRESSURE: 78 MMHG

## 2022-01-03 VITALS — DIASTOLIC BLOOD PRESSURE: 78 MMHG | HEART RATE: 100 BPM | SYSTOLIC BLOOD PRESSURE: 140 MMHG | TEMPERATURE: 98.6 F

## 2022-01-03 DIAGNOSIS — I16.0 HYPERTENSIVE URGENCY: ICD-10-CM

## 2022-01-03 DIAGNOSIS — R07.9 ACUTE CHEST PAIN: Primary | ICD-10-CM

## 2022-01-03 PROBLEM — Z3A.27 27 WEEKS GESTATION OF PREGNANCY: Status: ACTIVE | Noted: 2022-01-03

## 2022-01-03 LAB
ALBUMIN SERPL-MCNC: 3.8 G/DL (ref 3.5–5.2)
ALP BLD-CCNC: 90 U/L (ref 35–104)
ALT SERPL-CCNC: 9 U/L (ref 5–33)
ANION GAP SERPL CALCULATED.3IONS-SCNC: 12 MMOL/L (ref 7–19)
AST SERPL-CCNC: 17 U/L (ref 5–32)
BASOPHILS ABSOLUTE: 0 K/UL (ref 0–0.2)
BASOPHILS RELATIVE PERCENT: 0.1 % (ref 0–1)
BILIRUB SERPL-MCNC: <0.2 MG/DL (ref 0.2–1.2)
BUN BLDV-MCNC: 8 MG/DL (ref 6–20)
CALCIUM SERPL-MCNC: 8.9 MG/DL (ref 8.6–10)
CHLORIDE BLD-SCNC: 105 MMOL/L (ref 98–111)
CO2: 21 MMOL/L (ref 22–29)
CREAT SERPL-MCNC: 0.5 MG/DL (ref 0.5–0.9)
CREATININE URINE: 37.1 MG/DL (ref 4.2–622)
EOSINOPHILS ABSOLUTE: 0.1 K/UL (ref 0–0.6)
EOSINOPHILS RELATIVE PERCENT: 0.6 % (ref 0–5)
GFR AFRICAN AMERICAN: >59
GFR NON-AFRICAN AMERICAN: >60
GLUCOSE BLD-MCNC: 88 MG/DL (ref 74–109)
HCT VFR BLD CALC: 31.3 % (ref 37–47)
HEMOGLOBIN: 10.3 G/DL (ref 12–16)
IMMATURE GRANULOCYTES #: 0.1 K/UL
LYMPHOCYTES ABSOLUTE: 1.6 K/UL (ref 1.1–4.5)
LYMPHOCYTES RELATIVE PERCENT: 15.8 % (ref 20–40)
MCH RBC QN AUTO: 29.1 PG (ref 27–31)
MCHC RBC AUTO-ENTMCNC: 32.9 G/DL (ref 33–37)
MCV RBC AUTO: 88.4 FL (ref 81–99)
MONOCYTES ABSOLUTE: 0.7 K/UL (ref 0–0.9)
MONOCYTES RELATIVE PERCENT: 6.4 % (ref 0–10)
NEUTROPHILS ABSOLUTE: 7.8 K/UL (ref 1.5–7.5)
NEUTROPHILS RELATIVE PERCENT: 76.5 % (ref 50–65)
PDW BLD-RTO: 13.5 % (ref 11.5–14.5)
PLATELET # BLD: 194 K/UL (ref 130–400)
PMV BLD AUTO: 9.8 FL (ref 9.4–12.3)
POTASSIUM SERPL-SCNC: 3.7 MMOL/L (ref 3.5–5)
PROTEIN PROTEIN: 9 MG/DL (ref 15–45)
RBC # BLD: 3.54 M/UL (ref 4.2–5.4)
SODIUM BLD-SCNC: 138 MMOL/L (ref 136–145)
TOTAL PROTEIN: 6.7 G/DL (ref 6.6–8.7)
TROPONIN: <0.01 NG/ML (ref 0–0.03)
WBC # BLD: 10.2 K/UL (ref 4.8–10.8)

## 2022-01-03 PROCEDURE — 36415 COLL VENOUS BLD VENIPUNCTURE: CPT

## 2022-01-03 PROCEDURE — 82570 ASSAY OF URINE CREATININE: CPT

## 2022-01-03 PROCEDURE — 84484 ASSAY OF TROPONIN QUANT: CPT

## 2022-01-03 PROCEDURE — 93005 ELECTROCARDIOGRAM TRACING: CPT | Performed by: EMERGENCY MEDICINE

## 2022-01-03 PROCEDURE — 80053 COMPREHEN METABOLIC PANEL: CPT

## 2022-01-03 PROCEDURE — 99211 OFF/OP EST MAY X REQ PHY/QHP: CPT

## 2022-01-03 PROCEDURE — 99282 EMERGENCY DEPT VISIT SF MDM: CPT

## 2022-01-03 PROCEDURE — 71045 X-RAY EXAM CHEST 1 VIEW: CPT

## 2022-01-03 PROCEDURE — 84156 ASSAY OF PROTEIN URINE: CPT

## 2022-01-03 PROCEDURE — 85025 COMPLETE CBC W/AUTO DIFF WBC: CPT

## 2022-01-03 RX ORDER — SODIUM CHLORIDE 0.9 % (FLUSH) 0.9 %
5-40 SYRINGE (ML) INJECTION EVERY 12 HOURS SCHEDULED
Status: DISCONTINUED | OUTPATIENT
Start: 2022-01-03 | End: 2022-01-03 | Stop reason: HOSPADM

## 2022-01-03 RX ORDER — SODIUM CHLORIDE 9 MG/ML
25 INJECTION, SOLUTION INTRAVENOUS PRN
Status: DISCONTINUED | OUTPATIENT
Start: 2022-01-03 | End: 2022-01-03 | Stop reason: HOSPADM

## 2022-01-03 RX ORDER — ACETAMINOPHEN 325 MG/1
650 TABLET ORAL EVERY 4 HOURS PRN
Status: DISCONTINUED | OUTPATIENT
Start: 2022-01-03 | End: 2022-01-03 | Stop reason: HOSPADM

## 2022-01-03 RX ORDER — ONDANSETRON 2 MG/ML
4 INJECTION INTRAMUSCULAR; INTRAVENOUS EVERY 6 HOURS PRN
Status: DISCONTINUED | OUTPATIENT
Start: 2022-01-03 | End: 2022-01-03 | Stop reason: HOSPADM

## 2022-01-03 RX ORDER — FLUTICASONE PROPIONATE 50 MCG
1 SPRAY, SUSPENSION (ML) NASAL DAILY
COMMUNITY
End: 2022-03-24

## 2022-01-03 RX ORDER — SODIUM CHLORIDE 0.9 % (FLUSH) 0.9 %
5-40 SYRINGE (ML) INJECTION PRN
Status: DISCONTINUED | OUTPATIENT
Start: 2022-01-03 | End: 2022-01-03 | Stop reason: HOSPADM

## 2022-01-03 RX ORDER — CETIRIZINE HYDROCHLORIDE 5 MG/1
5 TABLET ORAL DAILY
COMMUNITY
End: 2022-05-09

## 2022-01-03 RX ORDER — ONDANSETRON 4 MG/1
4 TABLET, ORALLY DISINTEGRATING ORAL EVERY 8 HOURS PRN
Status: DISCONTINUED | OUTPATIENT
Start: 2022-01-03 | End: 2022-01-03 | Stop reason: HOSPADM

## 2022-01-03 ASSESSMENT — ENCOUNTER SYMPTOMS
BACK PAIN: 0
VOMITING: 0
DIARRHEA: 0
ABDOMINAL PAIN: 0
RHINORRHEA: 0
SHORTNESS OF BREATH: 0
COUGH: 0
NAUSEA: 0
SORE THROAT: 0

## 2022-01-03 ASSESSMENT — PAIN SCALES - GENERAL: PAINLEVEL_OUTOF10: 3

## 2022-01-03 ASSESSMENT — PAIN DESCRIPTION - PAIN TYPE: TYPE: ACUTE PAIN

## 2022-01-03 ASSESSMENT — PAIN DESCRIPTION - LOCATION: LOCATION: CHEST

## 2022-01-03 NOTE — TELEPHONE ENCOUNTER
Pt is c/o seeing spots this afternoon and school nurse check her b/p at it was 157/94. She did have a headache yesterday not relieved by Tylenol.  Per Ervin Goldstein pt sent to L&D

## 2022-01-04 LAB
EKG P AXIS: 43 DEGREES
EKG P-R INTERVAL: 148 MS
EKG Q-T INTERVAL: 328 MS
EKG QRS DURATION: 86 MS
EKG QTC CALCULATION (BAZETT): 403 MS
EKG T AXIS: 40 DEGREES

## 2022-01-04 PROCEDURE — 93010 ELECTROCARDIOGRAM REPORT: CPT | Performed by: INTERNAL MEDICINE

## 2022-01-04 NOTE — FLOWSHEET NOTE
ISIDRO Beck notified of patients concern of High BP. Tavo Garcia gave orders to discharge pt to the ER. Pt notified of doctors orders, and discharged per doctors order. Advised to go to the ER to be cleared by a cardiologist because of the left sided arm pain and the family hx of heart attacks at young age. Pt asked if she was allowed to eat, I advised her not to eat food at this time in case ER wanted to run tests. Patient discharged to ER per midwife's order.

## 2022-01-04 NOTE — ED PROVIDER NOTES
complaints. No fevers chills etc.  Of note states HR typically when not pregnant 80s and 90s. HPI    NursingNotes were reviewed. REVIEW OF SYSTEMS    (2-9 systems for level 4, 10 or more for level 5)     Review of Systems   Constitutional: Negative for chills and fever. HENT: Negative for rhinorrhea and sore throat. Respiratory: Negative for cough and shortness of breath. Cardiovascular: Positive for chest pain. Negative for leg swelling. Gastrointestinal: Negative for abdominal pain, diarrhea, nausea and vomiting. Genitourinary: Negative for dysuria, frequency and urgency. Musculoskeletal: Negative for back pain and neck pain. Neurological: Negative for dizziness, syncope, facial asymmetry, speech difficulty, weakness, numbness and headaches. All other systems reviewed and are negative.            PAST MEDICALHISTORY     Past Medical History:   Diagnosis Date    Acne     ADHD     Asthma     Breast disorder     Augmentation    Hypertension 7/27/2018    Hypoglycemia     Mental disorder     Anxiety and Depression    PCOS (polycystic ovarian syndrome)          SURGICAL HISTORY       Past Surgical History:   Procedure Laterality Date    BREAST BIOPSY Left     BREAST ENHANCEMENT SURGERY      CYSTOSCOPY Right 4/8/2020    RIGHT URETEROSCOPY LASER LITHOTRIPSY performed by Debra Rendon MD at [facility] Right 4/8/2020    STENT PLACEMENT performed by Debra Rendon MD at [address] [place] [address]     Discharge Medication List as of 1/3/2022 10:22 PM      CONTINUE these medications which have NOT CHANGED    Details   cetirizine (ZYRTEC) 5 MG tablet Take 5 mg by mouth dailyHistorical Med      fluticasone (FLONASE) 50 MCG/ACT nasal spray 1 spray by Each Nostril route dailyHistorical Med      PROBIOTIC, LACTOBACILLUS, PO Take by mouthHistorical Med      ferrous sulfate (RACIEL-KATERINA) 325 (65 Fe) MG tablet Take 1 tablet by mouth daily (with breakfast), Disp-30 tablet, R-5Normal      ondansetron (ZOFRAN ODT) 4 MG disintegrating tablet Take 1 tablet by mouth every 8 hours as needed for Nausea or Vomiting, Disp-30 tablet, R-3Normal      hydrOXYzine (ATARAX) 25 MG tablet Take 1 tablet by mouth every 8 hours as needed (uretheral pain), Disp-90 tablet, R-3Normal      Choline Dihydrogen Citrate (CHOLINE CITRATE PO) Take by mouthHistorical Med      Calcium Polycarbophil (FIBER-CAPS PO) Take by mouthHistorical Med      Docusate Sodium (COLACE PO) Take by mouthHistorical Med      progesterone (PROMETRIUM) 200 MG CAPS capsule Take 1 capsule by mouth nightly, Disp-90 capsule, R-0Normal      Prenatal MV-Min-Fe Fum-FA-DHA (PRENATAL 1 PO) Take by mouthHistorical Med             ALLERGIES     Keflex [cephalexin] and Penicillins    FAMILY HISTORY       Family History   Problem Relation Age of Onset    Breast Cancer Maternal Grandmother [facility]     Cancer Paternal Grandmother         Female-spread to other organs     Brain Cancer Other         Maternal Great Uncle           SOCIAL HISTORY       Social History     Socioeconomic History    Marital status:      Spouse name: None    Number of children: None    Years of education: None    Highest education level: None   Occupational History    None   Tobacco Use    Smoking status: Never Smoker    Smokeless tobacco: Never Used   Vaping Use    Vaping Use: Never used   Substance and Sexual Activity    Alcohol use: No     Alcohol/week: 0.0 standard drinks    Drug use: No    Sexual activity: Yes     Partners: Male   Other Topics Concern    None   Social History Narrative    None     Social Determinants of Health     Financial Resource Strain:     Difficulty of Paying Living Expenses: Not on file   Food Insecurity:     Worried About Running Out of Food in the Last Year: Not on file    Ora of Food in the Last Year: Not on file   Transportation Needs:     Lack of Transportation (Medical): Not on file    Lack of Transportation (Non-Medical): Not on file   Physical Activity:     Days of Exercise per Week: Not on file    Minutes of Exercise per Session: Not on file   Stress:     Feeling of Stress : Not on file   Social Connections:     Frequency of Communication with Friends and Family: Not on file    Frequency of Social Gatherings with Friends and Family: Not on file    Attends Yarsanism Services: Not on file    Active Member of [address] [place] [address] [employer] or Organizations: Not on file    Attends Club or Organization Meetings: Not on file    Marital Status: Not on file   Intimate Partner Violence:     Fear of Current or Ex-Partner: Not on file    Emotionally Abused: Not on file    Physically Abused: Not on file    Sexually Abused: Not on file   Housing Stability:     Unable to Pay for Housing in the Last Year: Not on file    Number of [place] in the Last Year: Not on file    Unstable Housing in the Last Year: Not on file       SCREENINGS             PHYSICAL EXAM    (up to 7 for level 4, 8 or more for level 5)     ED Triage Vitals [01/03/22 1921]   BP Temp Temp Source Pulse Resp SpO2 Height Weight   128/75 98.2 °F (36.8 °C) Oral 117 18 98 % 5' 3\" (1.6 m) 220 lb (99.8 kg)       Physical Exam  Vitals and nursing note reviewed. Constitutional:       General: She is not in acute distress. Appearance: Normal appearance. She is well-developed. She is not ill-appearing or diaphoretic. HENT:      Head: Normocephalic and atraumatic. Right Ear: External ear normal.      Left Ear: External ear normal.      Nose: Nose normal.   Eyes:      Conjunctiva/sclera: Conjunctivae normal.   Neck:      Trachea: No tracheal deviation. Cardiovascular:      Rate and Rhythm: Normal rate and regular rhythm. Pulses: Normal pulses. Heart sounds: Normal heart sounds. No murmur heard. Pulmonary:      Effort: Pulmonary effort is normal. No respiratory distress.       Breath sounds: Normal breath sounds. No wheezing or rales. Abdominal:      General: Abdomen is flat. Palpations: Abdomen is soft. There is no mass. Tenderness: There is no abdominal tenderness. Musculoskeletal:         General: Normal range of motion. Cervical back: Normal range of motion. Right lower leg: No edema. Left lower leg: No edema. Skin:     General: Skin is warm and dry. Neurological:      Mental Status: She is alert and oriented to person, place, and time. GCS: GCS eye subscore is 4. GCS verbal subscore is 5. GCS motor subscore is 6. DIAGNOSTIC RESULTS     EKG: All EKG's areinterpreted by the Emergency Department Physician who either signs or Co-signs this chart in the absence of a cardiologist.    103 sinus tachycardia no obvious ST changes nondiagnostic EKG    RADIOLOGY:  Non-plain film images such as CT, Ultrasound and MRI are read by the radiologist. Plain radiographic images are visualized and preliminarily interpreted bythe emergency physician with the below findings:        XR CHEST PORTABLE   Final Result   1. Diminished level of inspiration with no acute process identified. .   Signed by Dr Surjit Patricia:  Labs Reviewed   TROPONIN       All other labs were within normal range or not returned as of this dictation.     EMERGENCY DEPARTMENT COURSE and DIFFERENTIAL DIAGNOSIS/MDM:   Vitals:    Vitals:    01/03/22 1921 01/03/22 2059 01/03/22 2102 01/03/22 2215   BP: 128/75 119/69 121/78 123/78   Pulse: 117 105 104 102   Resp: 18  20 20   Temp: 98.2 °F (36.8 °C)      TempSrc: Oral      SpO2: 98%  98% 98%   Weight: 220 lb (99.8 kg)      Height: 5' 3\" (1.6 m)          MDM  Number of Diagnoses or Management Options     Amount and/or Complexity of Data Reviewed  Clinical lab tests: ordered and reviewed  Tests in the radiology section of CPT®: ordered and reviewed  Independent visualization of images, tracings, or specimens: yes      Intermittent HTN during pregnancy but has normalized here. Seen up on L and D and sent down as admitted to left arm pain and chest heaviness today but in our discussions admits this has been intermittent most of her pregnancy. Overall seems atypical and feel underlying anxiety causing some issue. Cbc and cmp reviewed from earlier. Lytes ok. Mild anemia. Vitals stable here mild tachycardia but I have no concern for PE. Trop neg. Stable for DC and can follow up outpt. Understands return precautions. CONSULTS:  None    PROCEDURES:  Unless otherwise noted below, none     Procedures    FINAL IMPRESSION      1. Acute chest pain    2.  Hypertensive urgency          DISPOSITION/PLAN   DISPOSITION Decision To Discharge 01/03/2022 09:14:15 PM      PATIENT REFERRED TO:  Cyril Renteria, APRN - CNM  [address] [phone]    Schedule an appointment as soon as possible for a visit in 1 week        DISCHARGE MEDICATIONS:  Discharge Medication List as of 1/3/2022 10:22 PM             (Please note that portions of this note were completed with a voice recognition program.  Efforts were made to edit thedictations but occasionally words are mis-transcribed.)    Landon You MD (electronically signed)  Attending Emergency Physician        Barry Ernst MD  01/04/22 2009

## 2022-01-06 ENCOUNTER — ROUTINE PRENATAL (OUTPATIENT)
Dept: OBGYN CLINIC | Age: 32
End: 2022-01-06

## 2022-01-06 VITALS
HEART RATE: 108 BPM | BODY MASS INDEX: 39.86 KG/M2 | WEIGHT: 225 LBS | DIASTOLIC BLOOD PRESSURE: 86 MMHG | SYSTOLIC BLOOD PRESSURE: 138 MMHG

## 2022-01-06 DIAGNOSIS — Z3A.27 27 WEEKS GESTATION OF PREGNANCY: ICD-10-CM

## 2022-01-06 DIAGNOSIS — Z34.03 ENCOUNTER FOR SUPERVISION OF NORMAL FIRST PREGNANCY IN THIRD TRIMESTER: Primary | ICD-10-CM

## 2022-01-06 DIAGNOSIS — R73.09 ABNORMAL GLUCOSE LEVEL: ICD-10-CM

## 2022-01-06 DIAGNOSIS — Z13.31 ENCOUNTER FOR SCREENING FOR DEPRESSION: ICD-10-CM

## 2022-01-06 LAB
GLUCOSE FASTING: 100 MG/DL (ref 75–110)
GLUCOSE TOLERANCE TEST 1 HOUR: 228 MG/DL (ref 75–190)

## 2022-01-06 PROCEDURE — S3005 EVAL SELF-ASSESS DEPRESSION: HCPCS | Performed by: NURSE PRACTITIONER

## 2022-01-06 PROCEDURE — 0502F SUBSEQUENT PRENATAL CARE: CPT | Performed by: NURSE PRACTITIONER

## 2022-01-06 NOTE — PROGRESS NOTES
CNM Prenatal Office Note  Subjective:  Si Query is here for a return obstetrical visit. Today she is 27w6d weeks EGA. She is doing well, taking her PNV as directed, and has no complaints. She  does not have vaginal bleeding, n/v, syncope, or headaches. Pt does feel fetal movement regularly. Has had some visual changes  Objective: Mother's Prenatal Vitals  BP: 138/86  Weight: 225 lb (102.1 kg)  Pulse: 108  Patient Position: Sitting  Prenatal Fetal Information  Fetal Heart Rate: 146  Movement: Present  Pt is A&Ox3, in no acute distress. Normocephalic, atraumatic. PERRL. Resp even and non-labored. Skin pink, warm & dry. Gravid abdomen. WILDE's well. Gait steady. Assessment:    IUP at 27w6d wks      Diagnosis Orders   1. Encounter for supervision of normal first pregnancy in third trimester     2. 27 weeks gestation of pregnancy     3. Encounter for screening for depression       Plan:   Pt counseled on balanced nutrition, adequate fluid intake, taking PNV daily, and exercise along with GHTN precautions, Kick count and  labor   Continue with routine prenatal care.   surveillance indicated for GHTN   RTC in 1 wks for prenatal visit    MEDICATIONS:  No orders of the defined types were placed in this encounter. ORDERS:  No orders of the defined types were placed in this encounter. More than 50% of this 20 min visit was education and counseling.

## 2022-01-13 NOTE — PATIENT INSTRUCTIONS
Patient Education        Weeks 26 to 30 of Your Pregnancy: Care Instructions  Overview     You are now entering your last trimester of pregnancy. Your baby is growing quickly. Jonathon Washington probably feel your baby moving around more often. Your doctor may ask you to count your baby's kicks. Your back may ache as your body gets used to your baby's size and length. If you haven't already had the Tdap shot during this pregnancy, talk to your doctor about getting it. It will help protect your  against pertussis infection. During this time, it's important to take care of yourself and pay attention to what your body needs. If you feel sexual, you can explore ways to be close with your partner that match your comfort and desire. Follow-up care is a key part of your treatment and safety. Be sure to make and go to all appointments, and call your doctor if you are having problems. It's also a good idea to know your test results and keep a list of the medicines you take. How can you care for yourself at home? Take it easy at work  · Take frequent breaks. If possible, stop working when you are tired, and rest during your lunch hour. · Take bathroom breaks every 2 hours. · Change positions often. If you sit for long periods, stand up and walk around. · When you stand for a long time, keep one foot on a low stool with your knee bent. After standing a lot, sit with your feet up. · Avoid fumes, chemicals, and tobacco smoke. Be sexual in your own way  · Having sex during pregnancy is okay, unless your doctor tells you not to. · You may be very interested in sex, or you may have no interest at all. · Your growing belly can make it hard to find a good position during intercourse. [place] and explore. · You may get cramps in your uterus when your partner touches your breasts. · A back rub may relieve the backache or cramps that sometimes follow orgasm. Learn about  labor  · Watch for signs of  labor. You may be going into labor if:  ? You have menstrual-like cramps, with or without nausea. ? You have about 6 or more contractions in 1 hour, even after you have had a glass of water and are resting. ? You have a low, dull backache that does not go away when you change your position. ? You have pain or pressure in your pelvis that comes and goes in a pattern. ? You have intestinal cramping or flu-like symptoms, with or without diarrhea.  ? You notice an increase or change in your vaginal discharge. Discharge may be heavy, mucus-like, watery, or streaked with blood. ? Your water breaks. · If you think you have  labor:  ? Drink 2 or 3 glasses of water or juice. Not drinking enough fluids can cause contractions. ? Stop what you are doing, and empty your bladder. Then lie down on your left side for at least 1 hour. ? While lying on your side, find your breast bone. Put your fingers in the soft spot just below it. Move your fingers down toward your belly button to find the top of your uterus. Check to see if it is tight. ? Contractions can be weak or strong. Record your contractions for an hour. Time a contraction from the start of one contraction to the start of the next one.  ? Single or several strong contractions without a pattern are called Maria Stein-Turpin contractions. They are practice contractions but not the start of labor. They often stop if you change what you are doing. ? Call your doctor if you have regular contractions. Where can you learn more? Go to https://The Meishijie websitequeenie.healthEverySignal. org and sign in to your [employer] account. Enter K978 in the [facility] box to learn more about \"Weeks 26 to 30 of Your Pregnancy: Care Instructions. \"     If you do not have an account, please click on the \"Sign Up Now\" link. Current as of: 2021               Content Version: 13.1  © 7109-2059 Healthwise, Incorporated. Care instructions adapted under license by [facility] ([facility]).  If you have questions about a medical condition or this instruction, always ask your healthcare professional. [address] any warranty or liability for your use of this information. Patient Education        Counting Your Baby's Kicks: Care Instructions  Overview     Counting your baby's kicks is one way your doctor can tell that your baby is healthy. Most women--especially in a first pregnancy--feel their baby move for the first time between 16 and 22 weeks. The movement may feel like flutters rather than kicks. Your baby may move more at certain times of the day. When you are active, you may notice less kicking than when you are resting. At your prenatal visits, your doctor will ask whether the baby is active. In your last trimester, your doctor may ask you to count the number of times you feel your baby move. Follow-up care is a key part of your treatment and safety. Be sure to make and go to all appointments, and call your doctor if you are having problems. It's also a good idea to know your test results and keep a list of the medicines you take. How do you count fetal kicks? · A common method of checking your baby's movement is to note the length of time it takes to count ten movements (such as kicks, flutters, or rolls). · Pick your baby's most active time of day to count. This may be any time from morning to evening. · If you don't feel 10 movements in an hour, have something to eat or drink and count for another hour. If you don't feel at least 10 movements in the 2-hour period, call your doctor. When should you call for help? Call your doctor now or seek immediate medical care if:    · You noticed that your baby has stopped moving or is moving much less than normal.   Watch closely for changes in your health, and be sure to contact your doctor if you have any problems. Where can you learn more? Go to https://carmencita.Nulogy. org and sign in to your [employer] account. Enter Y641 in the [facility] box to learn more about \"Counting Your Baby's Kicks: Care Instructions. \"     If you do not have an account, please click on the \"Sign Up Now\" link. Current as of: June 16, 2021               Content Version: 13.1  © 7232-3563 Healthwise, Incorporated. Care instructions adapted under license by [facility] ([facility]). If you have questions about a medical condition or this instruction, always ask your healthcare professional. [address] any warranty or liability for your use of this information.

## 2022-01-14 ENCOUNTER — ROUTINE PRENATAL (OUTPATIENT)
Dept: OBGYN CLINIC | Age: 32
End: 2022-01-14
Payer: COMMERCIAL

## 2022-01-14 VITALS
DIASTOLIC BLOOD PRESSURE: 77 MMHG | HEART RATE: 97 BPM | BODY MASS INDEX: 39.86 KG/M2 | WEIGHT: 225 LBS | SYSTOLIC BLOOD PRESSURE: 133 MMHG

## 2022-01-14 DIAGNOSIS — R39.15 URGENCY OF URINATION: ICD-10-CM

## 2022-01-14 DIAGNOSIS — Z3A.29 29 WEEKS GESTATION OF PREGNANCY: ICD-10-CM

## 2022-01-14 DIAGNOSIS — R35.0 FREQUENCY OF URINATION: ICD-10-CM

## 2022-01-14 DIAGNOSIS — R30.0 BURNING WITH URINATION: ICD-10-CM

## 2022-01-14 DIAGNOSIS — O24.410 DIET CONTROLLED GESTATIONAL DIABETES MELLITUS (GDM) IN THIRD TRIMESTER: Primary | ICD-10-CM

## 2022-01-14 LAB
APPEARANCE FLUID: CLEAR
BILIRUBIN, POC: NORMAL
BLOOD URINE, POC: NORMAL
CLARITY, POC: CLEAR
COLOR, POC: YELLOW
GLUCOSE URINE, POC: NORMAL
KETONES, POC: NORMAL
LEUKOCYTE EST, POC: NORMAL
NITRITE, POC: NORMAL
PH, POC: 6.5
PROTEIN, POC: NORMAL
SPECIFIC GRAVITY, POC: 1.03
UROBILINOGEN, POC: 0.2

## 2022-01-14 PROCEDURE — 81002 URINALYSIS NONAUTO W/O SCOPE: CPT | Performed by: NURSE PRACTITIONER

## 2022-01-14 PROCEDURE — 0502F SUBSEQUENT PRENATAL CARE: CPT | Performed by: NURSE PRACTITIONER

## 2022-01-14 RX ORDER — BLOOD-GLUCOSE METER
KIT MISCELLANEOUS
Status: ON HOLD | COMMUNITY
Start: 2022-01-06 | End: 2022-03-12 | Stop reason: HOSPADM

## 2022-01-14 RX ORDER — LANCETS 28 GAUGE
EACH MISCELLANEOUS
Status: ON HOLD | COMMUNITY
Start: 2022-01-06 | End: 2022-03-12 | Stop reason: HOSPADM

## 2022-01-14 NOTE — PROGRESS NOTES
Pt is here for routine parental care. Pt denies vaginal bleeding, cramping or leaking of fluid. Pt states + fetal movement. Pt feels anxious, [facility] told her usually people will gestational diabetes only makes it to 34 weeks, so she would like to discuss with you about it. Pt left urine to be checked with protein. Pt has been having headaches more often, she is not seeing floaters.

## 2022-01-14 NOTE — PROGRESS NOTES
CNM Prenatal Office Note  Subjective:  Si Query is here for a return obstetrical visit. Today she is 29w0d weeks EGA. She is doing well, taking her PNV as directed, and has no complaints. She  does not have vaginal bleeding, n/v, syncope, or headaches. Pt does feel fetal movement regularly. Objective: Mother's Prenatal Vitals  BP: 133/77  Weight: 225 lb (102.1 kg)  Pulse: 97  Patient Position: Sitting  Prenatal Fetal Information  Movement: Present  Pt is A&Ox3, in no acute distress. Normocephalic, atraumatic. PERRL. Resp even and non-labored. Skin pink, warm & dry. Gravid abdomen. WILDE's well. Gait steady. Assessment:    IUP at 29w0d wks      Diagnosis Orders   1. Encounter for supervision of normal first pregnancy in third trimester     2. Frequency of urination  POCT Urinalysis no Micro   3. Urgency of urination  POCT Urinalysis no Micro   4. Burning with urination  POCT Urinalysis no Micro   5. 29 weeks gestation of pregnancy     6. Diet controlled gestational diabetes mellitus (GDM) in third trimester       Plan:   Pt counseled on balanced nutrition, adequate fluid intake, taking PNV daily, and exercise along with glucose monitoring, GHTN precautions, Kick count and  labor   Continue with routine prenatal care.  Met with Jena    surveillance indicated for GHTN   RTC in 1 wks for prenatal visit     MEDICATIONS:  No orders of the defined types were placed in this encounter. ORDERS:  Orders Placed This Encounter   Procedures    POCT Urinalysis no Micro       More than 50% of this 20 min visit was education and counseling.

## 2022-01-19 NOTE — PATIENT INSTRUCTIONS
Patient Education        Weeks 26 to 30 of Your Pregnancy: Care Instructions  Overview     You are now entering your last trimester of pregnancy. Your baby is growing quickly. Wilene Pea probably feel your baby moving around more often. Your doctor may ask you to count your baby's kicks. Your back may ache as your body gets used to your baby's size and length. If you haven't already had the Tdap shot during this pregnancy, talk to your doctor about getting it. It will help protect your  against pertussis infection. During this time, it's important to take care of yourself and pay attention to what your body needs. If you feel sexual, you can explore ways to be close with your partner that match your comfort and desire. Follow-up care is a key part of your treatment and safety. Be sure to make and go to all appointments, and call your doctor if you are having problems. It's also a good idea to know your test results and keep a list of the medicines you take. How can you care for yourself at home? Take it easy at work  · Take frequent breaks. If possible, stop working when you are tired, and rest during your lunch hour. · Take bathroom breaks every 2 hours. · Change positions often. If you sit for long periods, stand up and walk around. · When you stand for a long time, keep one foot on a low stool with your knee bent. After standing a lot, sit with your feet up. · Avoid fumes, chemicals, and tobacco smoke. Be sexual in your own way  · Having sex during pregnancy is okay, unless your doctor tells you not to. · You may be very interested in sex, or you may have no interest at all. · Your growing belly can make it hard to find a good position during intercourse. [place] and explore. · You may get cramps in your uterus when your partner touches your breasts. · A back rub may relieve the backache or cramps that sometimes follow orgasm. Learn about  labor  · Watch for signs of  labor. You may be going into labor if:  ? You have menstrual-like cramps, with or without nausea. ? You have about 6 or more contractions in 1 hour, even after you have had a glass of water and are resting. ? You have a low, dull backache that does not go away when you change your position. ? You have pain or pressure in your pelvis that comes and goes in a pattern. ? You have intestinal cramping or flu-like symptoms, with or without diarrhea.  ? You notice an increase or change in your vaginal discharge. Discharge may be heavy, mucus-like, watery, or streaked with blood. ? Your water breaks. · If you think you have  labor:  ? Drink 2 or 3 glasses of water or juice. Not drinking enough fluids can cause contractions. ? Stop what you are doing, and empty your bladder. Then lie down on your left side for at least 1 hour. ? While lying on your side, find your breast bone. Put your fingers in the soft spot just below it. Move your fingers down toward your belly button to find the top of your uterus. Check to see if it is tight. ? Contractions can be weak or strong. Record your contractions for an hour. Time a contraction from the start of one contraction to the start of the next one.  ? Single or several strong contractions without a pattern are called [place]-Turpin contractions. They are practice contractions but not the start of labor. They often stop if you change what you are doing. ? Call your doctor if you have regular contractions. Where can you learn more? Go to https://[place] 4Space Culture & Mediaqueenie.healthStartupHighway. org and sign in to your [employer] account. Enter Z216 in the [facility] box to learn more about \"Weeks 26 to 30 of Your Pregnancy: Care Instructions. \"     If you do not have an account, please click on the \"Sign Up Now\" link. Current as of: 2021               Content Version: 13.1  © 1123-6613 Healthwise, Incorporated. Care instructions adapted under license by [facility] ([facility]).  If you have questions about a medical condition or this instruction, always ask your healthcare professional. [place] [address] any warranty or liability for your use of this information.

## 2022-01-20 ENCOUNTER — TELEMEDICINE (OUTPATIENT)
Dept: OBGYN CLINIC | Age: 32
End: 2022-01-20

## 2022-01-20 DIAGNOSIS — R30.0 BURNING WITH URINATION: ICD-10-CM

## 2022-01-20 DIAGNOSIS — R39.15 URGENCY OF URINATION: ICD-10-CM

## 2022-01-20 DIAGNOSIS — O24.414 INSULIN CONTROLLED GESTATIONAL DIABETES MELLITUS (GDM) IN THIRD TRIMESTER: ICD-10-CM

## 2022-01-20 DIAGNOSIS — Z3A.29 29 WEEKS GESTATION OF PREGNANCY: ICD-10-CM

## 2022-01-20 DIAGNOSIS — R35.0 FREQUENCY OF URINATION: Primary | ICD-10-CM

## 2022-01-20 PROCEDURE — 0502F SUBSEQUENT PRENATAL CARE: CPT | Performed by: NURSE PRACTITIONER

## 2022-01-20 NOTE — PROGRESS NOTES
Pt denies bleeding, leaking of fluid. She mentioned having possible sravani lebron other night. She had floaters Monday and Tuesday but didn't check her bp but they went away quickly. Pt feels her movements have slowed down not as intense, she went to [facility] today. She is fr see tele health Robyn today to discuss how frustrated with doing insulin, she did do insulin. She is needing more strips called into pharmacy since Christal 4 wants her test middle of the night if she gets up. She thinks 6 would be better than 4. CNM Prenatal VV Office Note    TELEHEALTH EVALUATION -- Audio/Visual (During [id number] public health emergency)    Ariana Leiva is a 32 y.o. female who presents today for her medical conditions/ complaints as noted below. Chief Complaint   Patient presents with    Routine Prenatal Visit       Subjective:  Ariana Leiva signed on for VV/return obstetrical visit. Today she is 29w6d weeks EGA. She is doing well, taking her PNV as directed, and has no complaints. She  does not have vaginal bleeding, n/v, syncope, or headaches. Pt does feel fetal movement regularly. Patient Active Problem List   Diagnosis    Hypertension    PIH (pregnancy induced hypertension), third trimester    NST (non-stress test) reactive    Term pregnancy    Lump or mass in breast    Diffuse cystic mastopathy    Kidney stone    Right ureteral calculus    Renal colic on right side    Postoperative pain    27 weeks gestation of pregnancy       Patient's last menstrual period was 06/16/2021 (exact date).   [id number]    Past Medical History:   Diagnosis Date    Acne     ADHD     Asthma     Breast disorder     Augmentation    Hypertension 7/27/2018    Hypoglycemia     Mental disorder     Anxiety and Depression    PCOS (polycystic ovarian syndrome)      Past Surgical History:   Procedure Laterality Date    BREAST BIOPSY Left     BREAST ENHANCEMENT SURGERY      CYSTOSCOPY Right 4/8/2020    RIGHT URETEROSCOPY LASER LITHOTRIPSY performed by Keo Cabrera MD at [address] Right 4/8/2020    STENT PLACEMENT performed by Keo Cabrera MD at [address] EXTRACTION       Family History   Problem Relation Age of Onset    Breast Cancer Maternal Grandmother [facility]     Cancer Paternal Grandmother         Female-spread to other organs     Brain Cancer Other         Maternal Great Uncle      Social History     Tobacco Use    Smoking status: Never Smoker    Smokeless tobacco: Never Used   Substance Use Topics    Alcohol use: No     Alcohol/week: 0.0 standard drinks       Current Outpatient Medications   Medication Sig Dispense Refill    Blood Glucose Monitoring Suppl (FREESTYLE LITE) w/Device KIT       FREESTYLE LITE strip       FreeStyle Lancets MISC       cetirizine (ZYRTEC) 5 MG tablet Take 5 mg by mouth daily      fluticasone (FLONASE) 50 MCG/ACT nasal spray 1 spray by Each Nostril route daily      PROBIOTIC, LACTOBACILLUS, PO Take by mouth      ferrous sulfate (RACIEL-KATERINA) 325 (65 Fe) MG tablet Take 1 tablet by mouth daily (with breakfast) 30 tablet 5    Choline Dihydrogen Citrate (CHOLINE CITRATE PO) Take by mouth      Docusate Sodium (COLACE PO) Take by mouth      Prenatal MV-Min-Fe Fum-FA-DHA (PRENATAL 1 PO) Take by mouth      progesterone (PROMETRIUM) 200 MG CAPS capsule Take 1 capsule by mouth nightly 90 capsule 0     No current facility-administered medications for this visit. Allergies   Allergen Reactions    Keflex [Cephalexin]     Penicillins Anaphylaxis and Hives     There were no vitals filed for this visit. There is no height or weight on file to calculate BMI. Due to this being a TeleHealth encounter, evaluation of the following organ systems is limited: Vitals/Constitutional/EENT/Resp/CV/GI//MS/Neuro/Skin/Heme-Lymph-Imm. Objective:     Pt is A&Ox3, in no acute distress. Normocephalic, atraumatic. PERRL. Resp even and non-labored.  Skin pink, warm & dry. Gravid abdomen. WILDE's well. Gait steady. MEDICATIONS:  No orders of the defined types were placed in this encounter. ORDERS:  No orders of the defined types were placed in this encounter. PLAN:  IUP at 29w6d wks      Diagnosis Orders   1. Frequency of urination     2. Urgency of urination     3. Burning with urination     4. 29 weeks gestation of pregnancy     5. Insulin controlled gestational diabetes mellitus (GDM) in third trimester        Pt counseled on balanced nutrition, adequate fluid intake, taking PNV daily, and exercise along with GHTN precautions, Kick count and  labor   Continue with routine prenatal care.   BPP today   RTC in 1 wks for prenatal visit     Pursuant to the emergency declaration under the [facility] waiver authority and the [employer] and [employer] Act, this Virtual  Visit was conducted, with patient's consent, to reduce the patient's risk of exposure to COVID-19 and provide continuity of care for an established patient. Services were provided through a video synchronous discussion virtually to substitute for in-person clinic visit.

## 2022-01-25 NOTE — TELEPHONE ENCOUNTER
Called to inform pt that pap showed ASCUS and HPV + and need for colpo. Pt will neelam colpo tomorrow when she comes in for ultrasound.
Gen: Well appearing in NAD  Head: NC/AT  Neck: trachea midline  Resp:  No distress  abd soft ND + epigastric tenderness + LUQ tenderness no rebound or guarding negative chan's signs negative CVAT   Ext: no deformities  Neuro:  A&O appears non focal  Skin:  Warm and dry as visualized  Psych:  Normal affect and mood

## 2022-01-27 NOTE — PATIENT INSTRUCTIONS
Patient Education        Weeks 30 to 28 of Your Pregnancy: Care Instructions  Overview     You've made it to the final months of your pregnancy! By now your baby is really starting to look like a baby, with hair and plump skin. As you enter the final weeks of pregnancy, the reality of having a baby may start to set in. This is a good time to set up a safe nursery and find quality  if needed. Doing this stuff ahead of time will allow you to focus on caring for and enjoying your new baby. You may also want to take a tour of your hospital's labor and delivery unit. This will help you get a better idea of what to expect while you're in the hospital.  During these last months, be sure to take good care of yourself. Pay attention to what your body needs. If your doctor says it's okay for you to work, don't push yourself too hard. If you haven't already had the Tdap shot during this pregnancy, talk to your doctor about getting it. It will help protect your  against pertussis infection. Follow-up care is a key part of your treatment and safety. Be sure to make and go to all appointments, and call your doctor if you are having problems. It's also a good idea to know your test results and keep a list of the medicines you take. How can you care for yourself at home? Pay attention to your baby's movements  · You should feel your baby move several times every day. · Your baby now turns less, and kicks and jabs more. · Your baby sleeps 20 to 45 minutes at a time and is more active at certain times of day. · If your doctor wants you to count your baby's kicks:  ? Empty your bladder, and lie on your side or relax in a comfortable chair. ? Write down your start time. ? Pay attention only to your baby's movements. Count any movement except hiccups. ? After you have counted 10 movements, write down your stop time. ? Write down how many minutes it took for your baby to move 10 times.   ? If an hour goes by women--especially in a first pregnancy--feel their baby move for the first time between 16 and 22 weeks. The movement may feel like flutters rather than kicks. Your baby may move more at certain times of the day. When you are active, you may notice less kicking than when you are resting. At your prenatal visits, your doctor will ask whether the baby is active. In your last trimester, your doctor may ask you to count the number of times you feel your baby move. Follow-up care is a key part of your treatment and safety. Be sure to make and go to all appointments, and call your doctor if you are having problems. It's also a good idea to know your test results and keep a list of the medicines you take. How do you count fetal kicks? · A common method of checking your baby's movement is to note the length of time it takes to count ten movements (such as kicks, flutters, or rolls). · Pick your baby's most active time of day to count. This may be any time from morning to evening. · If you don't feel 10 movements in an hour, have something to eat or drink and count for another hour. If you don't feel at least 10 movements in the 2-hour period, call your doctor. When should you call for help? Call your doctor now or seek immediate medical care if:    · You noticed that your baby has stopped moving or is moving much less than normal.   Watch closely for changes in your health, and be sure to contact your doctor if you have any problems. Where can you learn more? Go to https://Adaptive BiotechnologiespePollfish.Lifebooker.com. org and sign in to your Funny Or Die account. Enter O178 in the RageTank box to learn more about \"Counting Your Baby's Kicks: Care Instructions. \"     If you do not have an account, please click on the \"Sign Up Now\" link. Current as of: June 16, 2021               Content Version: 13.1  © 9467-8485 Healthwise, Incorporated. Care instructions adapted under license by [facility] ([facility]).  If you have questions about a medical condition or this instruction, always ask your healthcare professional. [address] any warranty or liability for your use of this information. Patient Education        Tetanus and Diphtheria Booster: Care Instructions  Your Care Instructions     A diphtheria and tetanus (Td) vaccine protects people against diphtheria and tetanus (lockjaw). You need a Td shot every 10 years to help prevent these diseases, which can be fatal.  You may also need a Td booster if you get a puncture wound or dirty cut. A booster is another dose of the vaccine. Young teens get a booster at 6to 15years of age. This booster is called Tdap and includes the vaccine against pertussis (whooping cough). All teens and adults who never had Tdap also need one dose of this vaccine. Common side effects of Td vaccination include soreness in the arm where you got the shot and a mild fever. These usually occur within 3 days of the shot and last a short time. Follow-up care is a key part of your treatment and safety. Be sure to make and go to all appointments, and call your doctor if you are having problems. It's also a good idea to know your test results and keep a list of the medicines you take. How can you care for yourself at home? · Take an over-the-counter pain medicine, such as acetaminophen (Tylenol), ibuprofen (Advil, Motrin), or naproxen (Aleve), if your arm is sore after the shot. Be safe with medicines. Read and follow all instructions on the label. Do not give aspirin to anyone younger than 20. It has been linked to Reye syndrome, a serious illness. · Put ice or a cold pack on the sore area for 10 to 20 minutes at a time. Put a thin cloth between the ice and your skin. When should you call for help? Call 911 anytime you think you may need emergency care. For example, call if:    · You have a seizure.     · You have symptoms of a severe allergic reaction.  These may include:  ? Sudden raised, red areas (hives) all over your body. ? Swelling of the throat, mouth, lips, or tongue. ? Trouble breathing. ? Passing out (losing consciousness). Or you may feel very lightheaded or suddenly feel weak, confused, or restless. Call your doctor now or seek immediate medical care if:    · You have symptoms of an allergic reaction, such as:  ? A rash or hives (raised, red areas on the skin). ? Itching. ? Swelling. ? Belly pain, nausea, or vomiting.     · You have a high fever. Watch closely for changes in your health, and be sure to contact your doctor if you have any problems. Where can you learn more? Go to https://Keaton RowpeLocal Energy Technologieseb.Wuhan Yunfeng Renewable Resources. org and sign in to your Encompass Media account. Enter V332 in the Pets are family too box to learn more about \"Tetanus and Diphtheria Booster: Care Instructions. \"     If you do not have an account, please click on the \"Sign Up Now\" link. Current as of: February 10, 2021               Content Version: 13.1  © 5568-7018 Healthwise, Incorporated. Care instructions adapted under license by [facility] ([facility]). If you have questions about a medical condition or this instruction, always ask your healthcare professional. [address] any warranty or liability for your use of this information.

## 2022-01-28 ENCOUNTER — ROUTINE PRENATAL (OUTPATIENT)
Dept: OBGYN CLINIC | Age: 32
End: 2022-01-28
Payer: COMMERCIAL

## 2022-01-28 VITALS
HEART RATE: 95 BPM | BODY MASS INDEX: 41.45 KG/M2 | DIASTOLIC BLOOD PRESSURE: 80 MMHG | WEIGHT: 234 LBS | SYSTOLIC BLOOD PRESSURE: 131 MMHG

## 2022-01-28 DIAGNOSIS — R73.09 ABNORMAL GLUCOSE LEVEL: ICD-10-CM

## 2022-01-28 DIAGNOSIS — Z3A.30 30 WEEKS GESTATION OF PREGNANCY: Primary | ICD-10-CM

## 2022-01-28 DIAGNOSIS — Z34.83 ENCOUNTER FOR SUPERVISION OF OTHER NORMAL PREGNANCY IN THIRD TRIMESTER: ICD-10-CM

## 2022-01-28 DIAGNOSIS — Z23 NEED FOR TDAP VACCINATION: ICD-10-CM

## 2022-01-28 DIAGNOSIS — O24.414 INSULIN CONTROLLED GESTATIONAL DIABETES MELLITUS (GDM) IN THIRD TRIMESTER: ICD-10-CM

## 2022-01-28 PROCEDURE — 0502F SUBSEQUENT PRENATAL CARE: CPT | Performed by: NURSE PRACTITIONER

## 2022-01-28 PROCEDURE — 90714 TD VACC NO PRESV 7 YRS+ IM: CPT | Performed by: NURSE PRACTITIONER

## 2022-01-28 RX ORDER — INSULIN DETEMIR 100 [IU]/ML
INJECTION, SOLUTION SUBCUTANEOUS
Status: ON HOLD | COMMUNITY
Start: 2022-01-20 | End: 2022-03-12 | Stop reason: HOSPADM

## 2022-01-28 RX ORDER — SILVER SULFADIAZINE 1 %
CREAM (GRAM) TOPICAL
Status: ON HOLD | COMMUNITY
Start: 2022-01-27 | End: 2022-03-12 | Stop reason: HOSPADM

## 2022-01-28 RX ORDER — PEN NEEDLE, DIABETIC 31 GX5/16"
NEEDLE, DISPOSABLE MISCELLANEOUS
Status: ON HOLD | COMMUNITY
Start: 2022-01-21 | End: 2022-03-12 | Stop reason: HOSPADM

## 2022-01-28 NOTE — PROGRESS NOTES
Pt is here for routine parental care. Pt denies vaginal bleeding, cramping or leaking of fluid. Pt states + fetal movement. After obtaining consent, and per orders of Rene Spnecer CNM , injection of tdap given in Right deltoid by Suzette Schwartz MA. Patient instructed to remain in clinic for 20 minutes afterwards, and to report any adverse reaction to me immediately.

## 2022-01-28 NOTE — PROGRESS NOTES
CNM Prenatal Office Note  Subjective:  Carol Bowen is here for a return obstetrical visit. Today she is 31w0d weeks EGA. She is doing well, taking her PNV as directed, and has no complaints. She  does not have vaginal bleeding, n/v, syncope, or headaches. Pt does feel fetal movement regularly. Objective: Mother's Prenatal Vitals  BP: 131/80  Weight: 234 lb (106.1 kg)  Pulse: 95  Patient Position: Sitting  Prenatal Fetal Information  Fetal Heart Rate:   Movement: Present  Pt is A&Ox3, in no acute distress. Normocephalic, atraumatic. PERRL. Resp even and non-labored. Skin pink, warm & dry. Gravid abdomen. WILDE's well. Gait steady. Assessment:    IUP at 31w0d wks      Diagnosis Orders   1. 30 weeks gestation of pregnancy     2. Insulin controlled gestational diabetes mellitus (GDM) in third trimester     3. Abnormal glucose level     4. Encounter for supervision of other normal pregnancy in third trimester     5. Need for Tdap vaccination  Tetanus-Diphth-Acell Pertussis (BOOSTRIX) injection 0.5 mL     Plan:   Pt counseled on balanced nutrition, adequate fluid intake, taking PNV daily, and exercise along with GHTN precautions, Kick count and  labor   Continue with routine prenatal care.   surveillance indicated for GDM, GHTN   RTC in 2 wks for prenatal visit    MEDICATIONS:  Orders Placed This Encounter   Medications    Tetanus-Diphth-Acell Pertussis (BOOSTRIX) injection 0.5 mL     ORDERS:  No orders of the defined types were placed in this encounter. More than 50% of this 20 min visit was education and counseling.

## 2022-02-09 ENCOUNTER — ROUTINE PRENATAL (OUTPATIENT)
Dept: OBGYN CLINIC | Age: 32
End: 2022-02-09

## 2022-02-09 VITALS
DIASTOLIC BLOOD PRESSURE: 81 MMHG | HEART RATE: 96 BPM | BODY MASS INDEX: 41.63 KG/M2 | SYSTOLIC BLOOD PRESSURE: 134 MMHG | WEIGHT: 235 LBS

## 2022-02-09 DIAGNOSIS — Z3A.32 32 WEEKS GESTATION OF PREGNANCY: ICD-10-CM

## 2022-02-09 DIAGNOSIS — O24.414 INSULIN CONTROLLED GESTATIONAL DIABETES MELLITUS (GDM) IN THIRD TRIMESTER: Primary | ICD-10-CM

## 2022-02-09 DIAGNOSIS — Z34.83 ENCOUNTER FOR SUPERVISION OF OTHER NORMAL PREGNANCY IN THIRD TRIMESTER: ICD-10-CM

## 2022-02-09 PROCEDURE — 0502F SUBSEQUENT PRENATAL CARE: CPT | Performed by: NURSE PRACTITIONER

## 2022-02-09 NOTE — PATIENT INSTRUCTIONS
Patient Education        Weeks 32 to 29 of Your Pregnancy: Care Instructions  Overview     During the last few weeks of your pregnancy, you may have more aches and pains. It's important to rest when you can. Your growing baby is putting more pressure on your bladder. So you may need to urinate more often. Hemorrhoids are also common. These are painful, itchy veins in the rectal area. You may want to talk with your doctor about banking your baby's umbilical cord blood. This is the blood left in the cord after birth. If you want to save this blood, you must arrange it ahead of time. You can't decide at the last minute. If you haven't already had the Tdap shot during this pregnancy, talk to your doctor about getting it. It will help protect your  against pertussis infection. Follow-up care is a key part of your treatment and safety. Be sure to make and go to all appointments, and call your doctor if you are having problems. It's also a good idea to know your test results and keep a list of the medicines you take. How can you care for yourself at home? Ease hemorrhoids  · Get more liquids, fruits, vegetables, and fiber in your diet. This will help keep your stools soft. · Avoid sitting for too long. Lie on your left side several times a day. · Clean yourself with soft, moist toilet paper. Or you can use witch hazel pads or personal hygiene pads. · If you are uncomfortable, try ice packs. Or you can sit in a warm sitz bath. Do these for 20 minutes at a time, as needed. · Use hydrocortisone cream for pain and itching. Two examples are Anusol and Preparation H Hydrocortisone. · Ask your doctor about taking an over-the-counter stool softener. Consider breastfeeding  · Experts recommend breastfeeding for 1 year or longer. · Breast milk may help protect your child from some health problems.   babies are less likely than formula-fed babies to:  ? Get ear infections, colds, diarrhea, and pneumonia. ? Be obese or get diabetes later in life. · Breastfeeding causes the release of a hormone called oxytocin. This hormone may help your uterus shrink back faster. · Breastfeeding may help you lose weight faster. Making milk burns calories. · Breastfeeding can lower your risk of breast cancer, ovarian cancer, and osteoporosis. Decide about circumcision for your baby  · As you make this decision, it may help to think about your personal, Christian, and family traditions. You get to decide if you will keep your baby's penis natural or if your baby will be circumcised. · If you decide that you would like to have your baby circumcised, talk with your doctor. You can share your concerns about pain. And you can discuss your preferences for anesthesia. Where can you learn more? Go to https://Boomerang.Venga. org and sign in to your Philtro account. Enter A026 in the Jascha box to learn more about \"Weeks 32 to 34 of Your Pregnancy: Care Instructions. \"     If you do not have an account, please click on the \"Sign Up Now\" link. Current as of: June 16, 2021               Content Version: 13.1  © 3301-5686 Sensdata. Care instructions adapted under license by [facility] ([facility]). If you have questions about a medical condition or this instruction, always ask your healthcare professional. [address] any warranty or liability for your use of this information. Patient Education        Counting Your Baby's Kicks: Care Instructions  Overview     Counting your baby's kicks is one way your doctor can tell that your baby is healthy. Most women--especially in a first pregnancy--feel their baby move for the first time between 16 and 22 weeks. The movement may feel like flutters rather than kicks. Your baby may move more at certain times of the day. When you are active, you may notice less kicking than when you are resting.  At your prenatal visits, your doctor will ask whether the baby is active. In your last trimester, your doctor may ask you to count the number of times you feel your baby move. Follow-up care is a key part of your treatment and safety. Be sure to make and go to all appointments, and call your doctor if you are having problems. It's also a good idea to know your test results and keep a list of the medicines you take. How do you count fetal kicks? · A common method of checking your baby's movement is to note the length of time it takes to count ten movements (such as kicks, flutters, or rolls). · Pick your baby's most active time of day to count. This may be any time from morning to evening. · If you don't feel 10 movements in an hour, have something to eat or drink and count for another hour. If you don't feel at least 10 movements in the 2-hour period, call your doctor. When should you call for help? Call your doctor now or seek immediate medical care if:    · You noticed that your baby has stopped moving or is moving much less than normal.   Watch closely for changes in your health, and be sure to contact your doctor if you have any problems. Where can you learn more? Go to https://InMyRoom.miiCard. org and sign in to your CytoViva account. Enter P739 in the Smart Education box to learn more about \"Counting Your Baby's Kicks: Care Instructions. \"     If you do not have an account, please click on the \"Sign Up Now\" link. Current as of: June 16, 2021               Content Version: 13.1  © 0787-3817 Healthwise, Incorporated. Care instructions adapted under license by [facility] ([facility]). If you have questions about a medical condition or this instruction, always ask your healthcare professional. [address] any warranty or liability for your use of this information.

## 2022-02-09 NOTE — PROGRESS NOTES
Pt is here for routine parental care. Pt denies vaginal bleeding, cramping or leaking of fluid. Pt states + fetal movement. Pt would like to be checked today due to a lot of pressure in the lower pelvic area, she feels like she has dropped. Pt left urine to check protein.

## 2022-02-10 ENCOUNTER — HOSPITAL ENCOUNTER (OUTPATIENT)
Age: 32
Discharge: HOME | End: 2022-02-11
Attending: NURSE PRACTITIONER | Admitting: NURSE PRACTITIONER
Payer: COMMERCIAL

## 2022-02-10 VITALS — WEIGHT: 235 LBS | TEMPERATURE: 98.6 F | HEIGHT: 63 IN | BODY MASS INDEX: 41.64 KG/M2 | RESPIRATION RATE: 18 BRPM

## 2022-02-10 PROBLEM — O47.00 PRETERM CONTRACTIONS: Status: ACTIVE | Noted: 2022-02-10

## 2022-02-10 LAB
BACTERIA: ABNORMAL /HPF
BILIRUBIN URINE: NEGATIVE
BLOOD, URINE: ABNORMAL
CLARITY: CLEAR
COLOR: YELLOW
CRYSTALS, UA: ABNORMAL /HPF
EPITHELIAL CELLS, UA: 10 /HPF (ref 0–5)
GLUCOSE URINE: NEGATIVE MG/DL
HYALINE CASTS: 1 /HPF (ref 0–8)
KETONES, URINE: ABNORMAL MG/DL
LEUKOCYTE ESTERASE, URINE: ABNORMAL
NITRITE, URINE: NEGATIVE
PH UA: 6.5 (ref 5–8)
PROTEIN UA: NEGATIVE MG/DL
RBC UA: 9 /HPF (ref 0–4)
SPECIFIC GRAVITY UA: 1.01 (ref 1–1.03)
UROBILINOGEN, URINE: 0.2 E.U./DL
WBC UA: 3 /HPF (ref 0–5)

## 2022-02-10 PROCEDURE — 99211 OFF/OP EST MAY X REQ PHY/QHP: CPT

## 2022-02-10 PROCEDURE — 81001 URINALYSIS AUTO W/SCOPE: CPT

## 2022-02-10 PROCEDURE — 87086 URINE CULTURE/COLONY COUNT: CPT

## 2022-02-10 RX ORDER — ACETAMINOPHEN 325 MG/1
650 TABLET ORAL EVERY 4 HOURS PRN
Status: DISCONTINUED | OUTPATIENT
Start: 2022-02-10 | End: 2022-02-11 | Stop reason: HOSPADM

## 2022-02-11 DIAGNOSIS — O26.893 DYSURIA DURING PREGNANCY IN THIRD TRIMESTER: Primary | ICD-10-CM

## 2022-02-11 DIAGNOSIS — R30.0 DYSURIA DURING PREGNANCY IN THIRD TRIMESTER: ICD-10-CM

## 2022-02-11 DIAGNOSIS — R30.0 DYSURIA DURING PREGNANCY IN THIRD TRIMESTER: Primary | ICD-10-CM

## 2022-02-11 DIAGNOSIS — O26.893 DYSURIA DURING PREGNANCY IN THIRD TRIMESTER: ICD-10-CM

## 2022-02-11 NOTE — PROGRESS NOTES
Pt reports to L&D complaining of contractions that have persisted on and off throughout the day beginning at 11am this morning. Pt reports no LOF or vaginal bleeding and reports positive fetal movement. EFM and toco applied to soft nontender abdomen. VS obtained. BP elevated. Repeat BP normal. Will monitor.

## 2022-02-11 NOTE — FLOWSHEET NOTE
Manolo Mata CNM notified via phone of pt arrival and concerns. Telephone orders to send urine, monitor, and recheck. If unchanged and no concerns with UA, pt can be discharged home.

## 2022-02-11 NOTE — FLOWSHEET NOTE
Written and verbal discharge teaching provided to patient. All questions answered and concerns addressed. Pt encouraged to attend her follow up appointment. Pt up to dress.

## 2022-02-13 LAB — URINE CULTURE, ROUTINE: NORMAL

## 2022-02-16 ENCOUNTER — ROUTINE PRENATAL (OUTPATIENT)
Dept: OBGYN CLINIC | Age: 32
End: 2022-02-16

## 2022-02-16 VITALS
HEART RATE: 93 BPM | DIASTOLIC BLOOD PRESSURE: 78 MMHG | BODY MASS INDEX: 41.98 KG/M2 | WEIGHT: 237 LBS | SYSTOLIC BLOOD PRESSURE: 123 MMHG

## 2022-02-16 DIAGNOSIS — Z3A.33 33 WEEKS GESTATION OF PREGNANCY: ICD-10-CM

## 2022-02-16 DIAGNOSIS — O24.414 INSULIN CONTROLLED GESTATIONAL DIABETES MELLITUS (GDM) IN THIRD TRIMESTER: ICD-10-CM

## 2022-02-16 DIAGNOSIS — R30.0 DYSURIA DURING PREGNANCY IN THIRD TRIMESTER: ICD-10-CM

## 2022-02-16 DIAGNOSIS — O26.893 DYSURIA DURING PREGNANCY IN THIRD TRIMESTER: ICD-10-CM

## 2022-02-16 DIAGNOSIS — Z34.83 ENCOUNTER FOR SUPERVISION OF OTHER NORMAL PREGNANCY IN THIRD TRIMESTER: Primary | ICD-10-CM

## 2022-02-16 PROCEDURE — 0502F SUBSEQUENT PRENATAL CARE: CPT | Performed by: NURSE PRACTITIONER

## 2022-02-16 NOTE — PATIENT INSTRUCTIONS
Patient Education        Weeks 32 to 29 of Your Pregnancy: Care Instructions  Overview     During the last few weeks of your pregnancy, you may have more aches and pains. It's important to rest when you can. Your growing baby is putting more pressure on your bladder. So you may need to urinate more often. Hemorrhoids are also common. These are painful, itchy veins in the rectal area. You may want to talk with your doctor about banking your baby's umbilical cord blood. This is the blood left in the cord after birth. If you want to save this blood, you must arrange it ahead of time. You can't decide at the last minute. If you haven't already had the Tdap shot during this pregnancy, talk to your doctor about getting it. It will help protect your  against pertussis infection. Follow-up care is a key part of your treatment and safety. Be sure to make and go to all appointments, and call your doctor if you are having problems. It's also a good idea to know your test results and keep a list of the medicines you take. How can you care for yourself at home? Ease hemorrhoids  · Get more liquids, fruits, vegetables, and fiber in your diet. This will help keep your stools soft. · Avoid sitting for too long. Lie on your left side several times a day. · Clean yourself with soft, moist toilet paper. Or you can use witch hazel pads or personal hygiene pads. · If you are uncomfortable, try ice packs. Or you can sit in a warm sitz bath. Do these for 20 minutes at a time, as needed. · Use hydrocortisone cream for pain and itching. Two examples are Anusol and Preparation H Hydrocortisone. · Ask your doctor about taking an over-the-counter stool softener. Consider breastfeeding  · Experts recommend breastfeeding for 1 year or longer. · Breast milk may help protect your child from some health problems.   babies are less likely than formula-fed babies to:  ? Get ear infections, colds, diarrhea, and pneumonia. ? Be obese or get diabetes later in life. · Breastfeeding causes the release of a hormone called oxytocin. This hormone may help your uterus shrink back faster. · Breastfeeding may help you lose weight faster. Making milk burns calories. · Breastfeeding can lower your risk of breast cancer, ovarian cancer, and osteoporosis. Decide about circumcision for your baby  · As you make this decision, it may help to think about your personal, Holiness, and family traditions. You get to decide if you will keep your baby's penis natural or if your baby will be circumcised. · If you decide that you would like to have your baby circumcised, talk with your doctor. You can share your concerns about pain. And you can discuss your preferences for anesthesia. Where can you learn more? Go to https://Ghostery.Diaphonics. org and sign in to your Super Evil Mega Corp account. Enter H090 in the Ideabove box to learn more about \"Weeks 32 to 34 of Your Pregnancy: Care Instructions. \"     If you do not have an account, please click on the \"Sign Up Now\" link. Current as of: June 16, 2021               Content Version: 13.1  © 3444-7346 NetIQ. Care instructions adapted under license by [facility] ([facility]). If you have questions about a medical condition or this instruction, always ask your healthcare professional. [address] any warranty or liability for your use of this information. Patient Education        Counting Your Baby's Kicks: Care Instructions  Overview     Counting your baby's kicks is one way your doctor can tell that your baby is healthy. Most women--especially in a first pregnancy--feel their baby move for the first time between 16 and 22 weeks. The movement may feel like flutters rather than kicks. Your baby may move more at certain times of the day. When you are active, you may notice less kicking than when you are resting.  At your prenatal visits, your doctor will ask whether the baby is active. In your last trimester, your doctor may ask you to count the number of times you feel your baby move. Follow-up care is a key part of your treatment and safety. Be sure to make and go to all appointments, and call your doctor if you are having problems. It's also a good idea to know your test results and keep a list of the medicines you take. How do you count fetal kicks? · A common method of checking your baby's movement is to note the length of time it takes to count ten movements (such as kicks, flutters, or rolls). · Pick your baby's most active time of day to count. This may be any time from morning to evening. · If you don't feel 10 movements in an hour, have something to eat or drink and count for another hour. If you don't feel at least 10 movements in the 2-hour period, call your doctor. When should you call for help? Call your doctor now or seek immediate medical care if:    · You noticed that your baby has stopped moving or is moving much less than normal.   Watch closely for changes in your health, and be sure to contact your doctor if you have any problems. Where can you learn more? Go to https://Feed.fm.Ziegler. org and sign in to your HydroLogex account. Enter P649 in the Lightspeed Audio Labs box to learn more about \"Counting Your Baby's Kicks: Care Instructions. \"     If you do not have an account, please click on the \"Sign Up Now\" link. Current as of: June 16, 2021               Content Version: 13.1  © 9638-4908 Healthwise, Incorporated. Care instructions adapted under license by [facility] ([facility]). If you have questions about a medical condition or this instruction, always ask your healthcare professional. [address] any warranty or liability for your use of this information.

## 2022-02-16 NOTE — PROGRESS NOTES
CNM Prenatal Office Note  Subjective:  Beto Downey is here for a return obstetrical visit. Today she is 33w5d weeks EGA. She is doing well, taking her PNV as directed, and has no complaints. She  does not have vaginal bleeding, n/v, syncope, or headaches. Pt does feel fetal movement regularly. Objective: Mother's Prenatal Vitals  BP: 123/78  Weight: 237 lb (107.5 kg)  Pulse: 93  Patient Position: Sitting  Prenatal Fetal Information  Fetal Heart Rate: 148  Movement: Present  Pt is A&Ox3, in no acute distress. Normocephalic, atraumatic. PERRL. Resp even and non-labored. Skin pink, warm & dry. Gravid abdomen. WILDE's well. Gait steady. Assessment:    IUP at 33w5d wks      Diagnosis Orders   1. Encounter for supervision of other normal pregnancy in third trimester     2. Dysuria during pregnancy in third trimester     3. Insulin controlled gestational diabetes mellitus (GDM) in third trimester     4. 33 weeks gestation of pregnancy       Plan:   Pt counseled on balanced nutrition, adequate fluid intake, taking PNV daily, and exercise along with GHTN precautions, Kick count and  labor   Continue with routine prenatal care.   surveillance indicated for GHTN, GDM, MCA   RTC in 1 wks for prenatal visit    MEDICATIONS:  No orders of the defined types were placed in this encounter. ORDERS:  No orders of the defined types were placed in this encounter. More than 50% of this 20 min visit was education and counseling.

## 2022-02-24 ENCOUNTER — ROUTINE PRENATAL (OUTPATIENT)
Dept: OBGYN CLINIC | Age: 32
End: 2022-02-24

## 2022-02-24 ENCOUNTER — HOSPITAL ENCOUNTER (OUTPATIENT)
Age: 32
Discharge: HOME | End: 2022-02-24
Attending: NURSE PRACTITIONER | Admitting: NURSE PRACTITIONER
Payer: COMMERCIAL

## 2022-02-24 VITALS
HEART RATE: 93 BPM | BODY MASS INDEX: 42.16 KG/M2 | DIASTOLIC BLOOD PRESSURE: 83 MMHG | SYSTOLIC BLOOD PRESSURE: 135 MMHG | WEIGHT: 238 LBS

## 2022-02-24 DIAGNOSIS — Z36.85 ENCOUNTER FOR ANTENATAL SCREENING FOR STREPTOCOCCUS B: ICD-10-CM

## 2022-02-24 DIAGNOSIS — R30.0 DYSURIA DURING PREGNANCY IN THIRD TRIMESTER: ICD-10-CM

## 2022-02-24 DIAGNOSIS — Z3A.34 34 WEEKS GESTATION OF PREGNANCY: Primary | ICD-10-CM

## 2022-02-24 DIAGNOSIS — O26.893 DYSURIA DURING PREGNANCY IN THIRD TRIMESTER: ICD-10-CM

## 2022-02-24 DIAGNOSIS — Z34.83 ENCOUNTER FOR SUPERVISION OF OTHER NORMAL PREGNANCY IN THIRD TRIMESTER: ICD-10-CM

## 2022-02-24 DIAGNOSIS — O24.414 INSULIN CONTROLLED GESTATIONAL DIABETES MELLITUS (GDM) IN THIRD TRIMESTER: ICD-10-CM

## 2022-02-24 PROBLEM — O60.03 PRETERM LABOR IN THIRD TRIMESTER WITHOUT DELIVERY: Status: ACTIVE | Noted: 2022-02-24

## 2022-02-24 PROCEDURE — 99211 OFF/OP EST MAY X REQ PHY/QHP: CPT

## 2022-02-24 PROCEDURE — 96372 THER/PROPH/DIAG INJ SC/IM: CPT

## 2022-02-24 PROCEDURE — 0502F SUBSEQUENT PRENATAL CARE: CPT | Performed by: NURSE PRACTITIONER

## 2022-02-24 PROCEDURE — 6360000002 HC RX W HCPCS: Performed by: NURSE PRACTITIONER

## 2022-02-24 RX ORDER — BETAMETHASONE SODIUM PHOSPHATE AND BETAMETHASONE ACETATE 3; 3 MG/ML; MG/ML
12 INJECTION, SUSPENSION INTRA-ARTICULAR; INTRALESIONAL; INTRAMUSCULAR; SOFT TISSUE ONCE
Status: COMPLETED | OUTPATIENT
Start: 2022-02-24 | End: 2022-02-24

## 2022-02-24 RX ADMIN — Medication 12 MG: at 17:41

## 2022-02-24 NOTE — PROGRESS NOTES
CNM Prenatal Office Note  Subjective:  Mario Rider is here for a return obstetrical visit. Today she is 34w6d weeks EGA. She is doing well, taking her PNV as directed, and has no complaints. She  does not have vaginal bleeding, n/v, syncope, or headaches. Pt does feel fetal movement regularly. Objective: Mother's Prenatal Vitals  BP: 135/83  Weight: 238 lb (108 kg)  Pulse: 93  Patient Position: Sitting  Prenatal Fetal Information  Fetal Heart Rate:   Movement: Present  Pt is A&Ox3, in no acute distress. Normocephalic, atraumatic. PERRL. Resp even and non-labored. Skin pink, warm & dry. Gravid abdomen. WILDE's well. Gait steady. Assessment:    IUP at 34w6d wks      Diagnosis Orders   1. 34 weeks gestation of pregnancy     2. Insulin controlled gestational diabetes mellitus (GDM) in third trimester     3. Encounter for supervision of other normal pregnancy in third trimester     4. Dysuria during pregnancy in third trimester       Plan:   Pt counseled on balanced nutrition, adequate fluid intake, taking PNV daily, and exercise along with GHTN precautions, Kick count and  labor   Continue with routine prenatal care.   surveillance indicated for GHTN, elevated MCA   RTC in 1 wks for prenatal visit    MEDICATIONS:  No orders of the defined types were placed in this encounter. ORDERS:  No orders of the defined types were placed in this encounter. More than 50% of this 20 min visit was education and counseling.

## 2022-02-24 NOTE — PATIENT INSTRUCTIONS
Patient Education        Weeks 34 to 39 of Your Pregnancy: Care Instructions  Overview     By now, your baby and your belly have grown quite large. It's almost time to give birth! Your baby's lungs are almost ready to breathe air. The skull bones are firm enough to protect your baby's head, but soft enough to move down through the birth canal.  You may be feeling excited and happy at times--but also anxious or scared. You might wonder how you'll know if you're in labor or what to expect during labor. Try to be open and flexible in your expectations of the birth. Because each birth is different, there's no way to know exactly what childbirth will be like for you. Talk to your doctor or midwife about any concerns you have. If you haven't already had the Tdap shot during this pregnancy, talk to your doctor about getting it. It will help protect your  against pertussis infection. In the 36th week, you'll probably have a test for group B streptococcus (GBS). GBS is a common type of bacteria that can live in the vagina and rectum. It can make your baby sick after birth. If you test positive, you will get antibiotics during labor. The medicine will help keep your baby from getting the bacteria. Follow-up care is a key part of your treatment and safety. Be sure to make and go to all appointments, and call your doctor if you are having problems. It's also a good idea to know your test results and keep a list of the medicines you take. How can you care for yourself at home? Learn about pain relief choices  · Pain is different for everyone. Talk with your doctor about your feelings about pain. · You can choose from several types of pain relief. These include medicine, breathing techniques, and comfort measures. You can use more than one option. · If you choose to have pain medicine during labor, talk to your doctor about your options. Some medicines lower anxiety and help with some of the pain.  Others make your 2021               Content Version: 13.1  © 5615-1118 [employer]. Care instructions adapted under license by [facility] ([facility]). If you have questions about a medical condition or this instruction, always ask your healthcare professional. [address] any warranty or liability for your use of this information. Patient Education        Counting Your Baby's Kicks: Care Instructions  Overview     Counting your baby's kicks is one way your doctor can tell that your baby is healthy. Most women--especially in a first pregnancy--feel their baby move for the first time between 16 and 22 weeks. The movement may feel like flutters rather than kicks. Your baby may move more at certain times of the day. When you are active, you may notice less kicking than when you are resting. At your prenatal visits, your doctor will ask whether the baby is active. In your last trimester, your doctor may ask you to count the number of times you feel your baby move. Follow-up care is a key part of your treatment and safety. Be sure to make and go to all appointments, and call your doctor if you are having problems. It's also a good idea to know your test results and keep a list of the medicines you take. How do you count fetal kicks? · A common method of checking your baby's movement is to note the length of time it takes to count ten movements (such as kicks, flutters, or rolls). · Pick your baby's most active time of day to count. This may be any time from morning to evening. · If you don't feel 10 movements in an hour, have something to eat or drink and count for another hour. If you don't feel at least 10 movements in the 2-hour period, call your doctor. When should you call for help?    Call your doctor now or seek immediate medical care if:    · You noticed that your baby has stopped moving or is moving much less than normal.   Watch closely for changes in your health, and be sure to contact your doctor if you have any problems. Where can you learn more? Go to [email or web address] org and sign in to your [employer] account. Enter N483 in the [employer] box to learn more about \"Counting Your Baby's Kicks: Care Instructions. \"     If you do not have an account, please click on the \"Sign Up Now\" link. Current as of: June 16, 2021               Content Version: 13.1  © 3872-7229 Healthwise, Incorporated. Care instructions adapted under license by [facility] [place] ([facility]). If you have questions about a medical condition or this instruction, always ask your healthcare professional. [address] any warranty or liability for your use of this information.

## 2022-02-25 ENCOUNTER — HOSPITAL ENCOUNTER (OUTPATIENT)
Age: 32
Discharge: HOME | End: 2022-02-25
Attending: NURSE PRACTITIONER | Admitting: NURSE PRACTITIONER
Payer: COMMERCIAL

## 2022-02-25 LAB — STREP B DNA AMP: NOT DETECTED

## 2022-02-25 PROCEDURE — 96372 THER/PROPH/DIAG INJ SC/IM: CPT

## 2022-02-25 PROCEDURE — 99211 OFF/OP EST MAY X REQ PHY/QHP: CPT

## 2022-02-25 PROCEDURE — 6360000002 HC RX W HCPCS: Performed by: NURSE PRACTITIONER

## 2022-02-25 RX ORDER — BETAMETHASONE SODIUM PHOSPHATE AND BETAMETHASONE ACETATE 3; 3 MG/ML; MG/ML
12 INJECTION, SUSPENSION INTRA-ARTICULAR; INTRALESIONAL; INTRAMUSCULAR; SOFT TISSUE ONCE
Status: COMPLETED | OUTPATIENT
Start: 2022-02-25 | End: 2022-02-25

## 2022-02-25 RX ADMIN — Medication 12 MG: at 17:16

## 2022-03-03 ENCOUNTER — ROUTINE PRENATAL (OUTPATIENT)
Dept: OBGYN CLINIC | Age: 32
End: 2022-03-03

## 2022-03-03 VITALS
SYSTOLIC BLOOD PRESSURE: 127 MMHG | HEART RATE: 101 BPM | DIASTOLIC BLOOD PRESSURE: 85 MMHG | WEIGHT: 234 LBS | BODY MASS INDEX: 41.45 KG/M2

## 2022-03-03 DIAGNOSIS — N76.89 VAGINA BOIL: ICD-10-CM

## 2022-03-03 DIAGNOSIS — R30.0 DYSURIA DURING PREGNANCY IN THIRD TRIMESTER: ICD-10-CM

## 2022-03-03 DIAGNOSIS — O24.414 INSULIN CONTROLLED GESTATIONAL DIABETES MELLITUS (GDM) IN THIRD TRIMESTER: Primary | ICD-10-CM

## 2022-03-03 DIAGNOSIS — Z3A.35 35 WEEKS GESTATION OF PREGNANCY: ICD-10-CM

## 2022-03-03 DIAGNOSIS — O26.893 DYSURIA DURING PREGNANCY IN THIRD TRIMESTER: ICD-10-CM

## 2022-03-03 DIAGNOSIS — Z34.83 ENCOUNTER FOR SUPERVISION OF OTHER NORMAL PREGNANCY IN THIRD TRIMESTER: ICD-10-CM

## 2022-03-03 PROCEDURE — 0502F SUBSEQUENT PRENATAL CARE: CPT | Performed by: NURSE PRACTITIONER

## 2022-03-03 NOTE — PROGRESS NOTES
CNM Prenatal Office Note  Subjective:  Samy Marcos is here for a return obstetrical visit. Today she is 35w6d weeks EGA. She is doing well, taking her PNV as directed, and has no complaints. She  does not have vaginal bleeding, n/v, syncope, or headaches. Pt does feel fetal movement regularly. Pt is here for routine parental care. Pt denies vaginal bleeding, cramping or leaking of fluid. Pt states + fetal movement. She has mentioned PG told her last time her fluid was low but was still decent. She has noticed vaginal boils on the outside her labia that her causing a lot of pain. Objective: Mother's Prenatal Vitals  BP: 127/85  Weight: 234 lb (106.1 kg)  Pulse: 101  Patient Position: Sitting  Prenatal Fetal Information  Fetal Heart Rate: 148   Movement: Present  Pt is A&Ox3, in no acute distress. Normocephalic, atraumatic. PERRL. Resp even and non-labored. Skin pink, warm & dry. Gravid abdomen. WILDE's well. Gait steady. Assessment:    IUP at 35w6d wks      Diagnosis Orders   1. Insulin controlled gestational diabetes mellitus (GDM) in third trimester     2. Encounter for supervision of other normal pregnancy in third trimester     3. Dysuria during pregnancy in third trimester     4. 35 weeks gestation of pregnancy     5. Vagina boil       Plan:   Pt counseled on balanced nutrition, adequate fluid intake, taking PNV daily, and exercise along with GHTN precautions, Kick count and  labor   Continue with routine prenatal care.   surveillance indicated for GDM, GHTN   RTC in 1 wks for prenatal visit    MEDICATIONS:  No orders of the defined types were placed in this encounter. ORDERS:  No orders of the defined types were placed in this encounter. More than 50% of this 20 min visit was education and counseling.

## 2022-03-03 NOTE — PATIENT INSTRUCTIONS
Patient Education        Weeks 34 to 39 of Your Pregnancy: Care Instructions  Overview     By now, your baby and your belly have grown quite large. It's almost time to give birth! Your baby's lungs are almost ready to breathe air. The skull bones are firm enough to protect your baby's head, but soft enough to move down through the birth canal.  You may be feeling excited and happy at times--but also anxious or scared. You might wonder how you'll know if you're in labor or what to expect during labor. Try to be open and flexible in your expectations of the birth. Because each birth is different, there's no way to know exactly what childbirth will be like for you. Talk to your doctor or midwife about any concerns you have. If you haven't already had the Tdap shot during this pregnancy, talk to your doctor about getting it. It will help protect your  against pertussis infection. In the 36th week, you'll probably have a test for group B streptococcus (GBS). GBS is a common type of bacteria that can live in the vagina and rectum. It can make your baby sick after birth. If you test positive, you will get antibiotics during labor. The medicine will help keep your baby from getting the bacteria. Follow-up care is a key part of your treatment and safety. Be sure to make and go to all appointments, and call your doctor if you are having problems. It's also a good idea to know your test results and keep a list of the medicines you take. How can you care for yourself at home? Learn about pain relief choices  · Pain is different for everyone. Talk with your doctor about your feelings about pain. · You can choose from several types of pain relief. These include medicine, breathing techniques, and comfort measures. You can use more than one option. · If you choose to have pain medicine during labor, talk to your doctor about your options. Some medicines lower anxiety and help with some of the pain.  Others make your lower body numb so that you won't feel pain. · Be sure to tell your doctor about your pain medicine choice before you start labor or very early in your labor. You may be able to change your mind as labor progresses. Labor and delivery  · The first stage of labor has three parts: early, active, and transition. ? It's common to have early labor at home. You can stay busy or rest, eat light snacks, drink clear fluids, and start counting contractions. ? When talking during a contraction gets hard, you may be moving to active labor. During active labor, you should head for the hospital if you aren't there already. ? You are in active labor when contractions come every 3 to 4 minutes and last about 60 seconds. Your cervix is opening more rapidly. ? If your water breaks, contractions will come faster and stronger. ? During transition, your cervix is stretching, and contractions are coming more rapidly. ? You may want to push, but your cervix might not be ready. Your doctor will tell you when to push. · The second stage starts when your cervix is completely opened and you are ready to push. ? Contractions are very strong to push the baby down the birth canal.  ? You will probably feel the urge to push. You may feel like you need to have a bowel movement. ? You may be coached to push with contractions. These contractions will be very strong, but you won't have them as often. You can get a little rest between contractions. ? One last push, and your baby is born. · The third stage is when a few more contractions push out the placenta. This may take 30 minutes or less. Where can you learn more? Go to [email or web address] and sign in to your [employer] account. Enter F875 in the [employer] box to learn more about \"Weeks 34 to 36 of Your Pregnancy: Care Instructions. \"     If you do not have an account, please click on the \"Sign Up Now\" link.   Current as of: June 16, 2021               Content Version: 13.1  © 2763-8540 [email or web address]. Care instructions adapted under license by [facility] ([facility]). If you have questions about a medical condition or this instruction, always ask your healthcare professional. [address] any warranty or liability for your use of this information. Patient Education        Counting Your Baby's Kicks: Care Instructions  Overview     Counting your baby's kicks is one way your doctor can tell that your baby is healthy. Most women--especially in a first pregnancy--feel their baby move for the first time between 16 and 22 weeks. The movement may feel like flutters rather than kicks. Your baby may move more at certain times of the day. When you are active, you may notice less kicking than when you are resting. At your prenatal visits, your doctor will ask whether the baby is active. In your last trimester, your doctor may ask you to count the number of times you feel your baby move. Follow-up care is a key part of your treatment and safety. Be sure to make and go to all appointments, and call your doctor if you are having problems. It's also a good idea to know your test results and keep a list of the medicines you take. How do you count fetal kicks? · A common method of checking your baby's movement is to note the length of time it takes to count ten movements (such as kicks, flutters, or rolls). · Pick your baby's most active time of day to count. This may be any time from morning to evening. · If you don't feel 10 movements in an hour, have something to eat or drink and count for another hour. If you don't feel at least 10 movements in the 2-hour period, call your doctor. When should you call for help?    Call your doctor now or seek immediate medical care if:    · You noticed that your baby has stopped moving or is moving much less than normal.   Watch closely for changes in your health, and be sure to contact your doctor if you have any problems. Where can you learn more? Go to https://[email or web address] and sign in to your [employer] account. Enter I510 in the [employer] box to learn more about \"Counting Your Baby's Kicks: Care Instructions. \"     If you do not have an account, please click on the \"Sign Up Now\" link. Current as of: June 16, 2021               Content Version: 13.1  © 8227-5754 Healthwise, Incorporated. Care instructions adapted under license by [facility] ([facility]). If you have questions about a medical condition or this instruction, always ask your healthcare professional. [address] any warranty or liability for your use of this information.

## 2022-03-10 ENCOUNTER — HOSPITAL ENCOUNTER (INPATIENT)
Age: 32
LOS: 2 days | Discharge: HOME | End: 2022-03-12
Attending: NURSE PRACTITIONER | Admitting: NURSE PRACTITIONER
Payer: COMMERCIAL

## 2022-03-10 ENCOUNTER — ANESTHESIA (OUTPATIENT)
Dept: LABOR AND DELIVERY | Age: 32
End: 2022-03-10
Payer: COMMERCIAL

## 2022-03-10 ENCOUNTER — TELEPHONE (OUTPATIENT)
Dept: OBGYN CLINIC | Age: 32
End: 2022-03-10

## 2022-03-10 ENCOUNTER — ANESTHESIA EVENT (OUTPATIENT)
Dept: LABOR AND DELIVERY | Age: 32
End: 2022-03-10
Payer: COMMERCIAL

## 2022-03-10 PROBLEM — Z37.9 NORMAL LABOR: Status: ACTIVE | Noted: 2022-03-10

## 2022-03-10 LAB
ABO/RH: NORMAL
AMPHETAMINE SCREEN, URINE: NEGATIVE
ANTIBODY SCREEN: NORMAL
BARBITURATE SCREEN URINE: NEGATIVE
BENZODIAZEPINE SCREEN, URINE: NEGATIVE
CANNABINOID SCREEN URINE: NEGATIVE
COCAINE METABOLITE SCREEN URINE: NEGATIVE
HCT VFR BLD CALC: 36.7 % (ref 37–47)
HEMOGLOBIN: 12 G/DL (ref 12–16)
Lab: NORMAL
MCH RBC QN AUTO: 28.4 PG (ref 27–31)
MCHC RBC AUTO-ENTMCNC: 32.7 G/DL (ref 33–37)
MCV RBC AUTO: 87 FL (ref 81–99)
OPIATE SCREEN URINE: NEGATIVE
PDW BLD-RTO: 13.9 % (ref 11.5–14.5)
PLATELET # BLD: 163 K/UL (ref 130–400)
PMV BLD AUTO: 9.6 FL (ref 9.4–12.3)
RBC # BLD: 4.22 M/UL (ref 4.2–5.4)
SARS-COV-2, NAAT: NOT DETECTED
WBC # BLD: 10.5 K/UL (ref 4.8–10.8)

## 2022-03-10 PROCEDURE — 6360000002 HC RX W HCPCS: Performed by: NURSE PRACTITIONER

## 2022-03-10 PROCEDURE — 2580000003 HC RX 258: Performed by: NURSE PRACTITIONER

## 2022-03-10 PROCEDURE — 86900 BLOOD TYPING SEROLOGIC ABO: CPT

## 2022-03-10 PROCEDURE — 99211 OFF/OP EST MAY X REQ PHY/QHP: CPT

## 2022-03-10 PROCEDURE — 6370000000 HC RX 637 (ALT 250 FOR IP): Performed by: NURSE PRACTITIONER

## 2022-03-10 PROCEDURE — 86850 RBC ANTIBODY SCREEN: CPT

## 2022-03-10 PROCEDURE — 85027 COMPLETE CBC AUTOMATED: CPT

## 2022-03-10 PROCEDURE — 10907ZC DRAINAGE OF AMNIOTIC FLUID, THERAPEUTIC FROM PRODUCTS OF CONCEPTION, VIA NATURAL OR ARTIFICIAL OPENING: ICD-10-PCS | Performed by: NURSE PRACTITIONER

## 2022-03-10 PROCEDURE — 80307 DRUG TEST PRSMV CHEM ANLYZR: CPT

## 2022-03-10 PROCEDURE — 7200000001 HC VAGINAL DELIVERY

## 2022-03-10 PROCEDURE — 88307 TISSUE EXAM BY PATHOLOGIST: CPT

## 2022-03-10 PROCEDURE — 87635 SARS-COV-2 COVID-19 AMP PRB: CPT

## 2022-03-10 PROCEDURE — 86592 SYPHILIS TEST NON-TREP QUAL: CPT

## 2022-03-10 PROCEDURE — 2500000003 HC RX 250 WO HCPCS

## 2022-03-10 PROCEDURE — 1220000000 HC SEMI PRIVATE OB R&B

## 2022-03-10 PROCEDURE — 36415 COLL VENOUS BLD VENIPUNCTURE: CPT

## 2022-03-10 PROCEDURE — 86901 BLOOD TYPING SEROLOGIC RH(D): CPT

## 2022-03-10 PROCEDURE — 59400 OBSTETRICAL CARE: CPT | Performed by: NURSE PRACTITIONER

## 2022-03-10 PROCEDURE — 3700000025 EPIDURAL BLOCK

## 2022-03-10 RX ORDER — SODIUM CHLORIDE, SODIUM LACTATE, POTASSIUM CHLORIDE, AND CALCIUM CHLORIDE .6; .31; .03; .02 G/100ML; G/100ML; G/100ML; G/100ML
500 INJECTION, SOLUTION INTRAVENOUS PRN
Status: DISCONTINUED | OUTPATIENT
Start: 2022-03-10 | End: 2022-03-12 | Stop reason: HOSPADM

## 2022-03-10 RX ORDER — IBUPROFEN 400 MG/1
800 TABLET ORAL EVERY 8 HOURS
Status: DISCONTINUED | OUTPATIENT
Start: 2022-03-10 | End: 2022-03-12 | Stop reason: HOSPADM

## 2022-03-10 RX ORDER — SODIUM CHLORIDE, SODIUM LACTATE, POTASSIUM CHLORIDE, AND CALCIUM CHLORIDE .6; .31; .03; .02 G/100ML; G/100ML; G/100ML; G/100ML
1000 INJECTION, SOLUTION INTRAVENOUS PRN
Status: DISCONTINUED | OUTPATIENT
Start: 2022-03-10 | End: 2022-03-12 | Stop reason: HOSPADM

## 2022-03-10 RX ORDER — DOCUSATE SODIUM 100 MG/1
100 CAPSULE, LIQUID FILLED ORAL 2 TIMES DAILY
Status: DISCONTINUED | OUTPATIENT
Start: 2022-03-10 | End: 2022-03-11

## 2022-03-10 RX ORDER — SODIUM CHLORIDE 0.9 % (FLUSH) 0.9 %
5-40 SYRINGE (ML) INJECTION PRN
Status: DISCONTINUED | OUTPATIENT
Start: 2022-03-10 | End: 2022-03-12 | Stop reason: HOSPADM

## 2022-03-10 RX ORDER — SODIUM CHLORIDE, SODIUM LACTATE, POTASSIUM CHLORIDE, CALCIUM CHLORIDE 600; 310; 30; 20 MG/100ML; MG/100ML; MG/100ML; MG/100ML
INJECTION, SOLUTION INTRAVENOUS CONTINUOUS
Status: DISCONTINUED | OUTPATIENT
Start: 2022-03-10 | End: 2022-03-11

## 2022-03-10 RX ORDER — ONDANSETRON 4 MG/1
4 TABLET, ORALLY DISINTEGRATING ORAL EVERY 8 HOURS PRN
Status: DISCONTINUED | OUTPATIENT
Start: 2022-03-10 | End: 2022-03-12 | Stop reason: HOSPADM

## 2022-03-10 RX ORDER — LIDOCAINE HYDROCHLORIDE 10 MG/ML
INJECTION, SOLUTION EPIDURAL; INFILTRATION; INTRACAUDAL; PERINEURAL PRN
Status: DISCONTINUED | OUTPATIENT
Start: 2022-03-10 | End: 2022-03-10 | Stop reason: SDUPTHER

## 2022-03-10 RX ORDER — ROPIVACAINE HYDROCHLORIDE 2 MG/ML
INJECTION, SOLUTION EPIDURAL; INFILTRATION; PERINEURAL PRN
Status: DISCONTINUED | OUTPATIENT
Start: 2022-03-10 | End: 2022-03-10 | Stop reason: SDUPTHER

## 2022-03-10 RX ORDER — ONDANSETRON 2 MG/ML
4 INJECTION INTRAMUSCULAR; INTRAVENOUS EVERY 6 HOURS PRN
Status: DISCONTINUED | OUTPATIENT
Start: 2022-03-10 | End: 2022-03-12 | Stop reason: HOSPADM

## 2022-03-10 RX ORDER — OXYCODONE HYDROCHLORIDE AND ACETAMINOPHEN 5; 325 MG/1; MG/1
1 TABLET ORAL EVERY 4 HOURS PRN
Status: DISCONTINUED | OUTPATIENT
Start: 2022-03-10 | End: 2022-03-12 | Stop reason: HOSPADM

## 2022-03-10 RX ORDER — LIDOCAINE HYDROCHLORIDE AND EPINEPHRINE 15; 5 MG/ML; UG/ML
INJECTION, SOLUTION EPIDURAL PRN
Status: DISCONTINUED | OUTPATIENT
Start: 2022-03-10 | End: 2022-03-10 | Stop reason: SDUPTHER

## 2022-03-10 RX ORDER — OXYCODONE HYDROCHLORIDE AND ACETAMINOPHEN 5; 325 MG/1; MG/1
2 TABLET ORAL EVERY 4 HOURS PRN
Status: DISCONTINUED | OUTPATIENT
Start: 2022-03-10 | End: 2022-03-12 | Stop reason: HOSPADM

## 2022-03-10 RX ORDER — ROPIVACAINE HYDROCHLORIDE 2 MG/ML
INJECTION, SOLUTION EPIDURAL; INFILTRATION; PERINEURAL CONTINUOUS PRN
Status: DISCONTINUED | OUTPATIENT
Start: 2022-03-10 | End: 2022-03-10 | Stop reason: SDUPTHER

## 2022-03-10 RX ORDER — ONDANSETRON 2 MG/ML
4 INJECTION INTRAMUSCULAR; INTRAVENOUS EVERY 6 HOURS PRN
Status: DISCONTINUED | OUTPATIENT
Start: 2022-03-10 | End: 2022-03-11

## 2022-03-10 RX ORDER — EPHEDRINE SULFATE 50 MG/ML
INJECTION, SOLUTION INTRAVENOUS PRN
Status: DISCONTINUED | OUTPATIENT
Start: 2022-03-10 | End: 2022-03-10 | Stop reason: SDUPTHER

## 2022-03-10 RX ORDER — SODIUM CHLORIDE 0.9 % (FLUSH) 0.9 %
5-40 SYRINGE (ML) INJECTION EVERY 12 HOURS SCHEDULED
Status: DISCONTINUED | OUTPATIENT
Start: 2022-03-10 | End: 2022-03-11

## 2022-03-10 RX ORDER — SODIUM CHLORIDE 9 MG/ML
25 INJECTION, SOLUTION INTRAVENOUS PRN
Status: DISCONTINUED | OUTPATIENT
Start: 2022-03-10 | End: 2022-03-12 | Stop reason: HOSPADM

## 2022-03-10 RX ORDER — DOCUSATE SODIUM 100 MG/1
100 CAPSULE, LIQUID FILLED ORAL 2 TIMES DAILY
Status: DISCONTINUED | OUTPATIENT
Start: 2022-03-10 | End: 2022-03-12 | Stop reason: HOSPADM

## 2022-03-10 RX ORDER — ACETAMINOPHEN 325 MG/1
650 TABLET ORAL EVERY 4 HOURS PRN
Status: DISCONTINUED | OUTPATIENT
Start: 2022-03-10 | End: 2022-03-12 | Stop reason: HOSPADM

## 2022-03-10 RX ADMIN — LIDOCAINE HYDROCHLORIDE 3 ML: 10 INJECTION, SOLUTION EPIDURAL; INFILTRATION; INTRACAUDAL; PERINEURAL at 17:54

## 2022-03-10 RX ADMIN — LIDOCAINE HYDROCHLORIDE AND EPINEPHRINE 3 ML: 15; 5 INJECTION, SOLUTION EPIDURAL at 18:02

## 2022-03-10 RX ADMIN — DOCUSATE SODIUM 100 MG: 100 CAPSULE, LIQUID FILLED ORAL at 21:37

## 2022-03-10 RX ADMIN — ROPIVACAINE HYDROCHLORIDE 5 ML: 2 INJECTION, SOLUTION EPIDURAL; INFILTRATION; PERINEURAL at 18:09

## 2022-03-10 RX ADMIN — ROPIVACAINE HYDROCHLORIDE 8 ML/HR: 2 INJECTION, SOLUTION EPIDURAL; INFILTRATION; PERINEURAL at 18:13

## 2022-03-10 RX ADMIN — EPHEDRINE SULFATE 20 MG: 50 INJECTION INTRAMUSCULAR; INTRAVENOUS; SUBCUTANEOUS at 18:35

## 2022-03-10 RX ADMIN — EPHEDRINE SULFATE 20 MG: 50 INJECTION INTRAMUSCULAR; INTRAVENOUS; SUBCUTANEOUS at 18:20

## 2022-03-10 RX ADMIN — SODIUM CHLORIDE, POTASSIUM CHLORIDE, SODIUM LACTATE AND CALCIUM CHLORIDE: 600; 310; 30; 20 INJECTION, SOLUTION INTRAVENOUS at 14:48

## 2022-03-10 RX ADMIN — Medication 1 MILLI-UNITS/MIN: at 16:12

## 2022-03-10 RX ADMIN — LIDOCAINE HYDROCHLORIDE 2 ML: 10 INJECTION, SOLUTION EPIDURAL; INFILTRATION; INTRACAUDAL; PERINEURAL at 18:00

## 2022-03-10 RX ADMIN — Medication 909 MILLI-UNITS/MIN: at 20:28

## 2022-03-10 RX ADMIN — ROPIVACAINE HYDROCHLORIDE 5 ML: 2 INJECTION, SOLUTION EPIDURAL; INFILTRATION; PERINEURAL at 18:04

## 2022-03-10 RX ADMIN — IBUPROFEN 800 MG: 400 TABLET ORAL at 21:36

## 2022-03-10 ASSESSMENT — PAIN DESCRIPTION - DESCRIPTORS: DESCRIPTORS: PRESSURE

## 2022-03-10 ASSESSMENT — PAIN SCALES - GENERAL: PAINLEVEL_OUTOF10: 3

## 2022-03-10 ASSESSMENT — PAIN DESCRIPTION - FREQUENCY: FREQUENCY: INTERMITTENT

## 2022-03-10 ASSESSMENT — PAIN DESCRIPTION - LOCATION: LOCATION: RECTUM

## 2022-03-10 ASSESSMENT — PAIN DESCRIPTION - PAIN TYPE: TYPE: ACUTE PAIN

## 2022-03-10 NOTE — TELEPHONE ENCOUNTER
Call transferred from pre service. Pt has been having irregular contractions since yesterday. However for the past hour the contractions have been every 10 mins and are getting more painful. Pt was dilated to 4 cm last wk.  Advised pt to go to L&D

## 2022-03-10 NOTE — ANESTHESIA PRE PROCEDURE
Department of Anesthesiology  Preprocedure Note       Name:  Pam Ambrosio   Age:  32 y.o.  :  1990                                          MRN:  [id number]         Date:  3/10/2022      Surgeon: * No surgeons listed *    Procedure: * No procedures listed *    Medications prior to admission:   Prior to Admission medications    Medication Sig Start Date End Date Taking?  Authorizing Provider   LEVEMIR FLEXTOUCH 100 UNIT/ML injection pen  22  Yes Historical Provider, MD   FREESTYLE LITE strip  22  Yes Historical Provider, MD   cetirizine (ZYRTEC) 5 MG tablet Take 5 mg by mouth daily   Yes Historical Provider, MD   fluticasone (FLONASE) 50 MCG/ACT nasal spray 1 spray by Each Nostril route daily   Yes Historical Provider, MD   PROBIOTIC, LACTOBACILLUS, PO Take by mouth   Yes Historical Provider, MD   ferrous sulfate (RACIEL-KATERINA) 325 (65 Fe) MG tablet Take 1 tablet by mouth daily (with breakfast) 21  Yes JOE Leal CNM   Choline Dihydrogen Citrate (CHOLINE CITRATE PO) Take by mouth   Yes Historical Provider, MD   Docusate Sodium (COLACE PO) Take by mouth   Yes Historical Provider, MD   Prenatal MV-Min-Fe Fum-FA-DHA (PRENATAL 1 PO) Take by mouth   Yes Historical Provider, MD MCGEE ULTRAFINE III SHORT PEN 31G X 8 MM MISC  22   Historical Provider, MD   SSD 1 % cream  22   Historical Provider, MD   Blood Glucose Monitoring Suppl (FREESTYLE LITE) w/Device KIT  22   Historical Provider, MD   FreeStyle Lancets [address] [facility]  22   Historical Provider, MD   progesterone (PROMETRIUM) 200 MG CAPS capsule Take 1 capsule by mouth nightly 7/28/21 10/26/21  JOE Munoz CNM       Current medications:    Current Facility-Administered Medications   Medication Dose Route Frequency Provider Last Rate Last Admin    sodium chloride flush 0.9 % injection 5-40 mL  5-40 mL IntraVENous 2 times per day JOE Leal CNM        sodium chloride flush 0.9 % injection 5-40 mL 5-40 mL IntraVENous PRN Chantal Sacks, APRN - CNM        0.9 % sodium chloride infusion  25 mL IntraVENous PRN Chantal Sacks, APRN - CNM        ondansetron (ZOFRAN-ODT) disintegrating tablet 4 mg  4 mg Oral Q8H PRN Chantal Sacks, APRN - CNM        Or    ondansetron (ZOFRAN) injection 4 mg  4 mg IntraVENous Q6H PRN Chantal Sacks, APRN - CNM        acetaminophen (TYLENOL) tablet 650 mg  650 mg Oral Q4H PRN Chantal Sacks, APRN - CNM        lactated ringers infusion   IntraVENous Continuous Chantal Sacks, APRN -  mL/hr at 03/10/22 1646 Rate Verify at 03/10/22 1646    lactated ringers bolus  500 mL IntraVENous PRN Nereida Dust Sprunger, APRN - CNM        Or    lactated ringers bolus  1,000 mL IntraVENous PRN Chantal Sacks, APRN - CNM        sodium chloride flush 0.9 % injection 5-40 mL  5-40 mL IntraVENous 2 times per day Chantal Sacks, APRN - CNM        sodium chloride flush 0.9 % injection 5-40 mL  5-40 mL IntraVENous PRN Chantal Sacks, APRN - CNM        0.9 % sodium chloride infusion  25 mL IntraVENous PRN Chantal Sacks, APRN - CNM        miSOPROStol (CYTOTEC) pre-split tablet TABS 25 mcg  25 mcg Vaginal Q4H Nereida Dust Sprunger, APRN - CNM        oxytocin (PITOCIN) 30 units in 500 mL infusion  1 leola-units/min IntraVENous Continuous Chantal Sacks, APRN - CNM 2 mL/hr at 03/10/22 1646 2 leola-units/min at 03/10/22 1646    oxytocin (PITOCIN) 30 units in 500 mL infusion  87.3 leola-units/min IntraVENous Continuous PRN Chantal Sacks, APRN - CNM        And    oxytocin (PITOCIN) 10 unit bolus from the bag  10 Units IntraVENous PRN Chantal Sacks, APRN - CNM        butorphanol (STADOL) injection 1 mg  1 mg IntraVENous Q3H PRN Chantal Sacks, APRN - CNM        [facility]ron [facility]) injection 4 mg  4 mg IntraVENous Q6H PRN Chantal Sacks, APRN - CNM        docusate sodium (COLACE) capsule 100 mg  100 mg Oral BID Angelia Sacks, APRN - CNM           Allergies: Allergies   Allergen Reactions    Keflex [Cephalexin] Anaphylaxis    Penicillins Anaphylaxis and Hives    Penicillin G Hives       Problem List:    Patient Active Problem List   Diagnosis Code    Hypertension I10    PIH (pregnancy induced hypertension), third trimester O13.3    NST (non-stress test) reactive Z36.89    Term pregnancy Z34.90    Lump or mass in breast N63.0    Diffuse cystic mastopathy N60.19    Kidney stone N20.0    Right ureteral calculus Y11.5    Renal colic on right side N38    Postoperative pain G89.18    27 weeks gestation of pregnancy Z3A.27     contractions O47.00     labor in third trimester without delivery O60.03    Normal labor O80, Z37.9       Past Medical History:        Diagnosis Date    Acne     ADHD     Asthma     Breast disorder     Augmentation    Hypertension 2018    Hypoglycemia     Mental disorder     Anxiety and Depression    PCOS (polycystic ovarian syndrome)        Past Surgical History:        Procedure Laterality Date    BREAST BIOPSY Left     BREAST ENHANCEMENT SURGERY      CYSTOSCOPY Right 2020    RIGHT URETEROSCOPY LASER LITHOTRIPSY performed by Beatrice Francis MD at [facility] Right 2020    STENT PLACEMENT performed by Beatrice Francis MD at [place] [address] EXTRACTION         Social History:    Social History     Tobacco Use    Smoking status: Never Smoker    Smokeless tobacco: Never Used   Substance Use Topics    Alcohol use: No     Alcohol/week: 0.0 standard drinks                                Counseling given: Not Answered      Vital Signs (Current):   Vitals:    03/10/22 1315 03/10/22 1330 03/10/22 1345 03/10/22 1400   BP: 122/78 138/86 130/80 132/82   Pulse: 96 88 92 95   Weight:    234 lb (106.1 kg)   Height:    5' 3\" (1.6 m)                                              BP Readings from Last 3 Encounters:   03/10/22 132/82   22 127/85   22 135/83       NPO Status: BMI:   Wt Readings from Last 3 Encounters:   03/10/22 234 lb (106.1 kg)   03/03/22 234 lb (106.1 kg)   02/24/22 238 lb (108 kg)     Body mass index is 41.45 kg/m². CBC:   Lab Results   Component Value Date    WBC 10.5 03/10/2022    RBC 4.22 03/10/2022    HGB 12.0 03/10/2022    HCT 36.7 03/10/2022    MCV 87.0 03/10/2022    RDW 13.9 03/10/2022     03/10/2022       CMP:   Lab Results   Component Value Date     01/03/2022    K 3.7 01/03/2022    K 3.9 07/30/2021     01/03/2022    CO2 21 01/03/2022    BUN 8 01/03/2022    CREATININE 0.5 01/03/2022    GFRAA >59 01/03/2022    LABGLOM >60 01/03/2022    GLUCOSE 88 01/03/2022    PROT 6.7 01/03/2022    PROT 7.7 01/31/2013    CALCIUM 8.9 01/03/2022    BILITOT <0.2 01/03/2022    ALKPHOS 90 01/03/2022    AST 17 01/03/2022    ALT 9 01/03/2022       POC Tests: No results for input(s): POCGLU, POCNA, POCK, POCCL, POCBUN, POCHEMO, POCHCT in the last 72 hours.     Coags:   Lab Results   Component Value Date    PROTIME 12.8 03/29/2021    INR 0.97 03/29/2021    APTT 29.3 03/29/2021       HCG (If Applicable):   Lab Results   Component Value Date    PREGTESTUR + 07/28/2021        ABGs:   Lab Results   Component Value Date    PHART 6.930 08/07/2018    PO2ART 11.0 08/07/2018    AQN6DXI 91.0 08/07/2018    WSG3EQA 19.1 08/07/2018    BEART -15.9 08/07/2018    W4PTVCYS 16.4 08/07/2018        Type & Screen (If Applicable):  No results found for: LABABO, LABRH    Drug/Infectious Status (If Applicable):  No results found for: HIV, HEPCAB    COVID-19 Screening (If Applicable):   Lab Results   Component Value Date    COVID19 Not Detected 07/30/2021           Anesthesia Evaluation  Patient summary reviewed and Nursing notes reviewed  Airway: Mallampati: III  TM distance: >3 FB   Neck ROM: full  Mouth opening: > = 3 FB Dental:          Pulmonary:   (+) asthma: seasonal asthma, Cardiovascular:  Exercise tolerance: poor (<4 METS),   (+) hypertension:,          Beta Blocker:  Not on Beta Blocker      ROS comment: Gestational HTN       Neuro/Psych:   (+) psychiatric history: stable with treatmentdepression/anxiety              ROS comment: ADHD  Depression  Anxiety   GI/Hepatic/Renal:   (+) renal disease: kidney stones,           Endo/Other:              Pt had no PAT visit        ROS comment: Gestational Diabetes   Abdominal:             Vascular: Other Findings:             Anesthesia Plan      epidural     ASA 3       Induction: intravenous. Anesthetic plan and risks discussed with patient. Use of blood products discussed with patient whom consented to blood products.                    Miri Currie, APRN - CRNA   3/10/2022

## 2022-03-11 LAB
HCT VFR BLD CALC: 33.3 % (ref 37–47)
HEMOGLOBIN: 10.9 G/DL (ref 12–16)
MCH RBC QN AUTO: 29.2 PG (ref 27–31)
MCHC RBC AUTO-ENTMCNC: 32.7 G/DL (ref 33–37)
MCV RBC AUTO: 89.3 FL (ref 81–99)
PDW BLD-RTO: 13.8 % (ref 11.5–14.5)
PLATELET # BLD: 141 K/UL (ref 130–400)
PMV BLD AUTO: 9.9 FL (ref 9.4–12.3)
RBC # BLD: 3.73 M/UL (ref 4.2–5.4)
WBC # BLD: 11.7 K/UL (ref 4.8–10.8)

## 2022-03-11 PROCEDURE — 6370000000 HC RX 637 (ALT 250 FOR IP): Performed by: NURSE PRACTITIONER

## 2022-03-11 PROCEDURE — 99024 POSTOP FOLLOW-UP VISIT: CPT | Performed by: NURSE PRACTITIONER

## 2022-03-11 PROCEDURE — 36415 COLL VENOUS BLD VENIPUNCTURE: CPT

## 2022-03-11 PROCEDURE — 85027 COMPLETE CBC AUTOMATED: CPT

## 2022-03-11 PROCEDURE — 1220000000 HC SEMI PRIVATE OB R&B

## 2022-03-11 RX ORDER — KETOROLAC TROMETHAMINE 30 MG/ML
60 INJECTION, SOLUTION INTRAMUSCULAR; INTRAVENOUS ONCE
Status: COMPLETED | OUTPATIENT
Start: 2022-03-11 | End: 2022-03-12

## 2022-03-11 RX ORDER — CYCLOBENZAPRINE HCL 10 MG
10 TABLET ORAL 2 TIMES DAILY PRN
Status: DISCONTINUED | OUTPATIENT
Start: 2022-03-11 | End: 2022-03-12 | Stop reason: HOSPADM

## 2022-03-11 RX ADMIN — DOCUSATE SODIUM 100 MG: 100 CAPSULE, LIQUID FILLED ORAL at 20:00

## 2022-03-11 RX ADMIN — OXYCODONE HYDROCHLORIDE AND ACETAMINOPHEN 1 TABLET: 5; 325 TABLET ORAL at 20:00

## 2022-03-11 RX ADMIN — DOCUSATE SODIUM 100 MG: 100 CAPSULE, LIQUID FILLED ORAL at 09:03

## 2022-03-11 RX ADMIN — ACETAMINOPHEN 650 MG: 325 TABLET ORAL at 01:47

## 2022-03-11 RX ADMIN — IBUPROFEN 800 MG: 400 TABLET ORAL at 17:00

## 2022-03-11 RX ADMIN — CYCLOBENZAPRINE HYDROCHLORIDE 10 MG: 10 TABLET, FILM COATED ORAL at 15:50

## 2022-03-11 RX ADMIN — OXYCODONE HYDROCHLORIDE AND ACETAMINOPHEN 1 TABLET: 5; 325 TABLET ORAL at 23:23

## 2022-03-11 RX ADMIN — OXYCODONE HYDROCHLORIDE AND ACETAMINOPHEN 1 TABLET: 5; 325 TABLET ORAL at 10:35

## 2022-03-11 RX ADMIN — IBUPROFEN 800 MG: 400 TABLET ORAL at 06:57

## 2022-03-11 ASSESSMENT — PAIN SCALES - GENERAL
PAINLEVEL_OUTOF10: 5
PAINLEVEL_OUTOF10: 9
PAINLEVEL_OUTOF10: 7
PAINLEVEL_OUTOF10: 4
PAINLEVEL_OUTOF10: 4
PAINLEVEL_OUTOF10: 7

## 2022-03-11 NOTE — FLOWSHEET NOTE
Pt c/o headache continuing despite taking motrin. Requests additional pain medication if possible. Pt states she has a hx of migraines, states this isn't as bad as a migraine, but isn't going away.  Medicated with tylenol per request.

## 2022-03-11 NOTE — FLOWSHEET NOTE
Pt called nurse to bathroom and states she felt like a clot came out while peeing. Nurse retrieved clot about the size of a half dollar out of the toilet. Pt bleeding is small, firm and at umbillicus.

## 2022-03-11 NOTE — FLOWSHEET NOTE
03/11/22 0402   Pain Management   Pain Level 7   Pain Assessment   Pain Assessment 0-10   Pain Location Head;Back     Patient resting in room with lights out and completely quiet. She states her headache and back pain have not been alleviated by any medication but positioning and the lights being out have helped some. She reports the back pain as spasms and her headache is frontal.  Jim Green notified of patient's pain. Will continue to monitor.

## 2022-03-11 NOTE — L&D DELIVERY NOTE
Mother's Information    Labor Events     labor?: Yes  Rupture type: AROM, Intact  Fluid color: Clear  Fluid odor: None     Mother Delivery Information    Episiotomy: None  Lacerations: None  Repair Suture: None  # of Repair Packets: 0  Surgical or Additional Est. Blood Loss (mL): 0 (View Only): Edit in Flowsheets   Combined Est. Blood Loss (mL): 0        Flo Villaseñor, Baby Girl Tawanda Reynoso [[id number]]    Labor Events     labor?: Yes   steroids?: Full Course  Cervical ripening date/time:     Antibiotics received during labor?: No  Rupture date/time: 3/10/22 16:10:00   Rupture type: AROM  Fluid color: Clear  Fluid odor: None  Induction: None  Augmentation: AROM  Indications for augmentation: Ineffective Contraction Pattern  Labor complications: None          Labor Event Times    Labor onset date/time:     Dilation complete date/time:  3/10/22 2018   Start pushing: 3/10/2022 2017   Decision time (emergent ):        Anesthesia    Method: Epidural     Assisted Delivery Details    Forceps attempted?: No  Vacuum extractor attempted?: No     Document Additional Attempt       Document Additional Attempt             Shoulder Dystocia    Shoulder dystocia present?: No  Add Second Maneuver  Add Third Maneuver  Add Fourth Maneuver  Add Fifth Maneuver  Add Sixth Maneuver  Add Seventh Maneuver  Add Eighth Maneuver  Add Ninth Maneuver      Presentation    Presentation: Vertex  Position: Right  _: Occiput  _: Anterior     [place] Information    Head delivery date/time: 3/10/2022 20:28:00   Changing the 's delivery date/time could affect patient care.:    Delivery date/time:  3/10/22 2028   Delivery type: Vaginal, Spontaneous    Details:        Delivery Providers    Delivering clinician: JOE Leal CNM   Provider Role    Torres Hoyos, TRISTA Darby, RN     Estela Xavier, RN     Anupama Prieto , RCP       Cord    Vessels: 3 Vessels  Complications: None  Delayed cord clamping?: Yes  Cord clamped date/time: 3/10/2022 2030  Cord blood disposition: Lab  Gases sent?: No  Stem cell collection (by provider): No     Placenta    Date/time: 3/10/2022 20:38:00  Removal: Spontaneous  Appearance: Intact  Disposition: Pathology     Delivery Resuscitation    Method: Bulb Suction, Stimulation     Apgars    Living status: Living  Apgars   1 Minute:  5 Minute:  10 Minute 15 Minute 20 Minute   Skin Color: 1  2       Heart Rate: 2  2       Reflex Irritability: 2  2       Muscle Tone: 2  2       Respiratory Effort: 2  2       Total: 9  10                  Skin to Skin    Skin to skin initiation date/time: 3/10/22 20:29:00 CST   Skin to skin with: Mother  Skin to skin end date/time: 3/10/22 20:43:00   Breastfeeding initiated date/time: 3/10/2022 20:49:09     [place] Measurements    Weight: 3080 g Length: 53.3 cm   Head circumference: 33 cm       Delivery Information    Episiotomy: None  Perineal lacerations: None    Vaginal laceration: No    Cervical laceration: No    Surgical or additional est. blood loss (mL): 0 (View Only): Edit in Flowsheets   Combined est. blood loss (mL): 0  Repair suture: None     Other Procedures    Procedures: None        AMTSL performed. Mother and vigorous  stable in recovery.

## 2022-03-11 NOTE — ANESTHESIA POSTPROCEDURE EVALUATION
Department of Anesthesiology  Postprocedure Note    Patient: David oYu  MRN: [id number]  YOB: 1990  Date of evaluation: 3/10/2022  Time:  8:31 PM     Procedure Summary     Date: 03/10/22 Room / Location:     Anesthesia Start: 0985 Anesthesia Stop: 2031    Procedure: Labor Analgesia Diagnosis:     Scheduled Providers:  Responsible Provider: JOE Meeks CRNA    Anesthesia Type: epidural ASA Status: 3          Anesthesia Type: epidural    [place] Phase I:      [place] Phase II:      Last vitals: Reviewed and per EMR flowsheets.        Anesthesia Post Evaluation    Patient location during evaluation: bedside  Patient participation: complete - patient participated  Level of consciousness: awake and alert  Pain score: 0  Airway patency: patent  Nausea & Vomiting: no nausea and no vomiting  Complications: no  Cardiovascular status: hemodynamically stable  Respiratory status: spontaneous ventilation, room air, nonlabored ventilation and acceptable  Hydration status: hypovolemic

## 2022-03-11 NOTE — FLOWSHEET NOTE
Pt states back pain has not gotten better since given flexeril . Instructed pt to give it time and try a heating pad. GREGG Beck CNM also notified

## 2022-03-12 VITALS
RESPIRATION RATE: 16 BRPM | HEART RATE: 65 BPM | BODY MASS INDEX: 41.46 KG/M2 | TEMPERATURE: 97.7 F | DIASTOLIC BLOOD PRESSURE: 88 MMHG | WEIGHT: 234 LBS | OXYGEN SATURATION: 99 % | SYSTOLIC BLOOD PRESSURE: 134 MMHG | HEIGHT: 63 IN

## 2022-03-12 PROCEDURE — 6360000002 HC RX W HCPCS: Performed by: NURSE PRACTITIONER

## 2022-03-12 PROCEDURE — 6370000000 HC RX 637 (ALT 250 FOR IP): Performed by: NURSE PRACTITIONER

## 2022-03-12 PROCEDURE — 99024 POSTOP FOLLOW-UP VISIT: CPT | Performed by: NURSE PRACTITIONER

## 2022-03-12 RX ORDER — IBUPROFEN 800 MG/1
800 TABLET ORAL EVERY 8 HOURS
Qty: 120 TABLET | Refills: 0 | Status: SHIPPED | OUTPATIENT
Start: 2022-03-12 | End: 2022-03-24

## 2022-03-12 RX ORDER — CYCLOBENZAPRINE HCL 10 MG
10 TABLET ORAL 2 TIMES DAILY PRN
Qty: 30 TABLET | Refills: 1 | Status: SHIPPED | OUTPATIENT
Start: 2022-03-12 | End: 2022-03-24

## 2022-03-12 RX ADMIN — KETOROLAC TROMETHAMINE 60 MG: 30 INJECTION, SOLUTION INTRAMUSCULAR at 01:02

## 2022-03-12 RX ADMIN — DOCUSATE SODIUM 100 MG: 100 CAPSULE, LIQUID FILLED ORAL at 08:04

## 2022-03-12 RX ADMIN — IBUPROFEN 800 MG: 400 TABLET ORAL at 08:04

## 2022-03-12 ASSESSMENT — PAIN SCALES - GENERAL
PAINLEVEL_OUTOF10: 5
PAINLEVEL_OUTOF10: 2

## 2022-03-12 NOTE — PROGRESS NOTES
Comprehensive Nutrition Assessment    Type and Reason for Visit:  Initial,Positive Nutrition Screen    Nutrition Recommendations/Plan: continue current POC    Nutrition Assessment:  Pt is adequately nourished. No c/o nausea at time of visit. PO intake better this a.m    Malnutrition Assessment:  Malnutrition Status:  No malnutrition    Context:  Acute Illness     Findings of the 6 clinical characteristics of malnutrition:  Energy Intake:  Mild decrease in energy intake (Comment)  Weight Loss:  No significant weight loss     Body Fat Loss:  No significant body fat loss     Muscle Mass Loss:  No significant muscle mass loss    Fluid Accumulation:  No significant fluid accumulation Extremities   Strength:  Not Performed    Nutrition Related Findings:     Adequately nourished    Wounds:  None       Current Nutrition Therapies:    ADULT DIET;  Regular    Anthropometric Measures:  · Height: 5' 3\" (160 cm)  · Current Body Weight: 234 lb (106.1 kg)   · Admission Body Weight: 234 lb (106.1 kg)    · Usual Body Weight:       · Ideal Body Weight: 115 lbs; % Ideal Body Weight 203.5 %   · BMI: 41.5  · Adjusted Body Weight:  ; No Adjustment    · BMI Categories: Obese Class 3 (BMI 40.0 or greater)       Nutrition Diagnosis:   · Inadequate oral intake related to acute injury/trauma as evidenced by intake 26-50%,intake 51-75%,poor intake prior to admission      Nutrition Interventions:   Food and/or Nutrient Delivery:  Continue Current Diet  Nutrition Education/Counseling:  No recommendation at this time   Coordination of Nutrition Care:  Continue to monitor while inpatient    Goals:  po intake 50% or greater       Nutrition Monitoring and Evaluation:   Behavioral-Environmental Outcomes:  None Identified   Food/Nutrient Intake Outcomes:  Food and Nutrient Intake  Physical Signs/Symptoms Outcomes:  Biochemical Data,Weight,Skin,Nutrition Focused Physical Findings,Fluid Status or Edema     Discharge Planning:    Continue current diet     Electronically signed by Jacqui Ward MS, RD, LD on 3/12/22 at 11:50 AM CST    Contact: [phone]

## 2022-03-12 NOTE — DISCHARGE SUMMARY
[facility]   Discharge Summary    Patient Name: Rashmi Mao  Patient : 1990  Room/Bed: 0212/0212-01  Primary Care Physician: No primary care provider on file. Admit Date: 3/10/2022 12:43 PM    Reasons for Admission on 3/10/2022 12:43 PM  Normal labor [O80, Z37.9]  No comment available  Onset of Labor   Labor    Patient Active Problem List   Diagnosis    Hypertension    PIH (pregnancy induced hypertension), third trimester    NST (non-stress test) reactive    Term pregnancy    Lump or mass in breast    Diffuse cystic mastopathy    Kidney stone    Right ureteral calculus    Renal colic on right side    Postoperative pain    27 weeks gestation of pregnancy     contractions     labor in third trimester without delivery    Normal labor    36 weeks gestation of pregnancy    Gestational hypertension, antepartum    Gestational diabetes mellitus (GDM) affecting second pregnancy     (normal spontaneous vaginal delivery)       Rashmi Mao is a 32y.o. year old  who presented at 36w7d gestation with No chief complaint on file. . Her pregnancy has been complicated by Gestational Hypertension, Gestations Diabetes and Elevated MCA   Please see H&P for detailed information. She was admitted and progressed in labor with pitocin augmentation and Epidural anesthesia to completely dilated and effaced. No Immediate complications were encountered. Please see procedure note for detailed information. Her postpartum course has been unremarkable. See H&H history below. She had no signs or symptoms of acute blood loss anemia. She is ambulating well, voiding without difficulty and her lochia is within normal limits. She is feeding by breast without difficulty. She was stable for discharge on postpartum day 2.      Hemoglobin   Date Value Ref Range Status   2022 10.9 (L) 12.0 - 16.0 g/dL Final   03/10/2022 12.0 12.0 - 16.0 g/dL Final Hematocrit   Date Value Ref Range Status   2022 33.3 (L) 37.0 - 47.0 % Final   03/10/2022 36.7 (L) 37.0 - 47.0 % Final         Prenatal Procedures  Management of Obstetric Complications:     Intrapartum Procedures  Spontaneous Vaginal Delivery: See Labor and Delivery Summary    Postpartum Procedures  None     Data  Information for the patient's :  Justino Ellison Girl Tavia Logan   female   Birth Weight: 6 lb 12.6 oz (3.08 kg)         Postpartum Day 2: Vaginal      REVIEW OF SYSTEMS  Patient is a [id number] The patient feels well. The patient denies emotional concerns. Pain is well controlled with current medications. The baby iswell. Baby is feeding via breast. Urinary output is adequate. The patient is ambulating well. The patient is tolerating a normal diet.        PHYSICAL EXAM     BP (!) 134/90   Pulse 63   Temp 97.7 °F (36.5 °C) (Temporal)   Resp 16   Ht 5' 3\" (1.6 m)   Wt 234 lb (106.1 kg)   LMP 2021 (Exact Date)   SpO2 99%   Breastfeeding Unknown   BMI 41.45 kg/m²   General:    alert, appears stated age and cooperative   Breast:  Soft, non-tender   Bowel Sounds:  active   Lochia:  appropriate   Uterine Fundus:   firm   Episiotomy/lac:  healing well   DVT Evaluation:  No evidence of DVT seen on physical exam.     Lab Results   Component Value Date    WBC 11.7 (H) 2022    HGB 10.9 (L) 2022    HCT 33.3 (L) 2022    MCV 89.3 2022     2022         DISCHARGE DIAGNOSIS:        Discharge Information/Patient Instructions:     Medication List      START taking these medications    cyclobenzaprine 10 MG tablet  Commonly known as: FLEXERIL  Take 1 tablet by mouth 2 times daily as needed for Muscle spasms     ibuprofen 800 MG tablet  Commonly known as: ADVIL;MOTRIN  Take 1 tablet by mouth every 8 hours        CONTINUE taking these medications    cetirizine 5 MG tablet  Commonly known as: ZYRTEC     CHOLINE CITRATE PO     ferrous sulfate 325 (65 Fe) MG tablet  Commonly known as: Tanna-Jamaal  Take 1 tablet by mouth daily (with breakfast)     fluticasone 50 MCG/ACT nasal spray  Commonly known as: FLONASE     PRENATAL 1 PO     PROBIOTIC (LACTOBACILLUS) PO        STOP taking these medications    B-D ULTRAFINE III SHORT PEN 31G X 8 MM Misc  Generic drug: Insulin Pen Needle     COLACE PO     FreeStyle Lancets Misc     FREESTYLE LITE strip  Generic drug: blood glucose test strips     FreeStyle Lite w/Device Kit     Levemir FlexTouch 100 UNIT/ML injection pen  Generic drug: insulin detemir     progesterone 200 MG Caps capsule  Commonly known as: Prometrium     SSD 1 % cream  Generic drug: silver sulfADIAZINE           Where to Get Your Medications      These medications were sent to [facility]/pharmacy #[id number]- [address] [place] [id number]    Hours: 24-hours Phone: [phone]   · cyclobenzaprine 10 MG tablet  · ibuprofen 800 MG tablet         No discharge procedures on file. Activity & Diet: Precautions and instructions were discussed with her including but not limited to maintaining a regular diet at home, practicing local hygiene, pelvic rest, and signs and symptoms to report including heavy bleeding, frequent passage of clots, fainting or dizziness, foul odor of lochia, increased pain, fever, or any other concerns. Wound Care: N/A    Discharge to: Home in stable condition  She was asked to follow up in the office in 2 weeks. Discharge Date: 3/12/2022      JOE Guerrero CNM      Comments: Follow-up care and birth control were reviewed. Signs and symptoms of mastitis and Post Partum Depression were reviewed. The patient is to notify her physician if any of these occur. The patient was counseled on secondary smoke risks and the increased risk of sudden infant death syndrome and respiratory problems to her baby with exposure.  She was counseled on various alternate recommendations to decrease the exposure to secondary smoke to her children. Total time spent on discharge and coordination of care is >30 minutes. All questions were answered and the patient voiced understanding.

## 2022-03-14 ENCOUNTER — TELEPHONE (OUTPATIENT)
Dept: OBGYN CLINIC | Age: 32
End: 2022-03-14

## 2022-03-14 ENCOUNTER — ANESTHESIA (OUTPATIENT)
Dept: EMERGENCY DEPT | Age: 32
End: 2022-03-14
Payer: COMMERCIAL

## 2022-03-14 ENCOUNTER — HOSPITAL ENCOUNTER (OUTPATIENT)
Dept: LABOR AND DELIVERY | Age: 32
Discharge: HOME | End: 2022-03-14
Payer: COMMERCIAL

## 2022-03-14 ENCOUNTER — ANESTHESIA EVENT (OUTPATIENT)
Dept: EMERGENCY DEPT | Age: 32
End: 2022-03-14
Payer: COMMERCIAL

## 2022-03-14 ENCOUNTER — HOSPITAL ENCOUNTER (EMERGENCY)
Age: 32
Discharge: HOME | End: 2022-03-14
Payer: COMMERCIAL

## 2022-03-14 VITALS
TEMPERATURE: 98.6 F | BODY MASS INDEX: 41.64 KG/M2 | RESPIRATION RATE: 18 BRPM | OXYGEN SATURATION: 95 % | HEART RATE: 88 BPM | SYSTOLIC BLOOD PRESSURE: 138 MMHG | HEIGHT: 63 IN | DIASTOLIC BLOOD PRESSURE: 88 MMHG | WEIGHT: 235 LBS

## 2022-03-14 LAB — RPR: NORMAL

## 2022-03-14 PROCEDURE — 2500000003 HC RX 250 WO HCPCS: Performed by: PHYSICIAN ASSISTANT

## 2022-03-14 PROCEDURE — 96366 THER/PROPH/DIAG IV INF ADDON: CPT

## 2022-03-14 PROCEDURE — S9443 LACTATION CLASS: HCPCS

## 2022-03-14 PROCEDURE — 96365 THER/PROPH/DIAG IV INF INIT: CPT

## 2022-03-14 PROCEDURE — 62273 INJECT EPIDURAL PATCH: CPT | Performed by: ANESTHESIOLOGY

## 2022-03-14 PROCEDURE — 96375 TX/PRO/DX INJ NEW DRUG ADDON: CPT

## 2022-03-14 PROCEDURE — 2580000003 HC RX 258: Performed by: PHYSICIAN ASSISTANT

## 2022-03-14 PROCEDURE — 6360000002 HC RX W HCPCS: Performed by: PHYSICIAN ASSISTANT

## 2022-03-14 PROCEDURE — 99284 EMERGENCY DEPT VISIT MOD MDM: CPT

## 2022-03-14 RX ORDER — 0.9 % SODIUM CHLORIDE 0.9 %
500 INTRAVENOUS SOLUTION INTRAVENOUS ONCE
Status: COMPLETED | OUTPATIENT
Start: 2022-03-14 | End: 2022-03-14

## 2022-03-14 RX ORDER — ORPHENADRINE CITRATE 30 MG/ML
60 INJECTION INTRAMUSCULAR; INTRAVENOUS ONCE
Status: COMPLETED | OUTPATIENT
Start: 2022-03-14 | End: 2022-03-14

## 2022-03-14 RX ORDER — KETOROLAC TROMETHAMINE 30 MG/ML
30 INJECTION, SOLUTION INTRAMUSCULAR; INTRAVENOUS ONCE
Status: COMPLETED | OUTPATIENT
Start: 2022-03-14 | End: 2022-03-14

## 2022-03-14 RX ADMIN — KETOROLAC TROMETHAMINE 30 MG: 30 INJECTION, SOLUTION INTRAMUSCULAR at 11:56

## 2022-03-14 RX ADMIN — SODIUM CHLORIDE 500 ML: 9 INJECTION, SOLUTION INTRAVENOUS at 11:56

## 2022-03-14 RX ADMIN — CAFFEINE AND SODIUM BENZOATE 500 MG: 125 INJECTION, SOLUTION INTRAMUSCULAR; INTRAVENOUS at 12:37

## 2022-03-14 RX ADMIN — ORPHENADRINE CITRATE 60 MG: 30 INJECTION INTRAMUSCULAR; INTRAVENOUS at 12:36

## 2022-03-14 ASSESSMENT — ENCOUNTER SYMPTOMS
NAUSEA: 0
COLOR CHANGE: 0
ABDOMINAL PAIN: 0
EYE DISCHARGE: 0
SHORTNESS OF BREATH: 0
BACK PAIN: 0
APNEA: 0
RHINORRHEA: 0
EYE PAIN: 0
ABDOMINAL DISTENTION: 0
COUGH: 0
SORE THROAT: 0
PHOTOPHOBIA: 1

## 2022-03-14 ASSESSMENT — PAIN SCALES - GENERAL: PAINLEVEL_OUTOF10: 9

## 2022-03-14 NOTE — ED NOTES
Pt is breastfeeding will administer norflex and caffeine afterwards     Radha Raya, TRISTA  03/14/22 0516

## 2022-03-14 NOTE — ED PROVIDER NOTES
[facility] EMERGENCY DEPT  eMERGENCYdEPARTMENT eNCOUnter      Pt Name: Antelmo Frederick  MRN: [id number]  [place] 1990  Date of evaluation: 3/14/2022  Provider:LUH Tafoya    CHIEF COMPLAINT       Chief Complaint   Patient presents with    Headache     since epidural on Thursday, sent by OB         HISTORY OF PRESENT ILLNESS  (Location/Symptom, Timing/Onset, Context/Setting, Quality, Duration, Modifying Factors, Severity.)   Antelmo Frederick is a 32 y.o. female who presents to the emergency department with complaints of neck and occipital pain epidural on Thursday no difficulties with delivery. No fever or chills. Sent here by [facility] for blood patch. The patient is doing home rest hydration flexeril and caffeine. Continues to be debilitating. Has photophobia no vision changes. No emesis. HPI    Nursing Notes were reviewed and I agree. REVIEW OF SYSTEMS    (2-9 systems for level 4, 10 or more for level 5)     Review of Systems   Constitutional: Negative for activity change, appetite change, chills and fever. HENT: Negative for congestion, postnasal drip, rhinorrhea and sore throat. Eyes: Positive for photophobia. Negative for pain, discharge and visual disturbance. Respiratory: Negative for apnea, cough and shortness of breath. Cardiovascular: Negative for chest pain and leg swelling. Gastrointestinal: Negative for abdominal distention, abdominal pain and nausea. Genitourinary: Negative for vaginal bleeding. Musculoskeletal: Negative for arthralgias, back pain, joint swelling, neck pain and neck stiffness. Skin: Negative for color change and rash. Neurological: Positive for headaches. Negative for dizziness, syncope and facial asymmetry. Hematological: Negative for adenopathy. Does not bruise/bleed easily. Psychiatric/Behavioral: Negative for agitation, behavioral problems and confusion. Except as noted above the remainder of the review of systems was reviewed and negative. PAST MEDICAL HISTORY     Past Medical History:   Diagnosis Date    Acne     ADHD     Asthma     Breast disorder     Augmentation    Hypertension 7/27/2018    Hypoglycemia     Mental disorder     Anxiety and Depression    PCOS (polycystic ovarian syndrome)          SURGICAL HISTORY       Past Surgical History:   Procedure Laterality Date    BREAST BIOPSY Left     BREAST ENHANCEMENT SURGERY      CYSTOSCOPY Right 4/8/2020    RIGHT URETEROSCOPY LASER LITHOTRIPSY performed by Shona Clay MD at [address] [facility] Right 4/8/2020    STENT PLACEMENT performed by Shona Clay MD at [address] [facility]       Previous Medications    CETIRIZINE (ZYRTEC) 5 MG TABLET    Take 5 mg by mouth daily    CHOLINE DIHYDROGEN CITRATE (CHOLINE CITRATE PO)    Take by mouth    CYCLOBENZAPRINE (FLEXERIL) 10 MG TABLET    Take 1 tablet by mouth 2 times daily as needed for Muscle spasms    FERROUS SULFATE (RACIEL-KATERINA) 325 (65 FE) MG TABLET    Take 1 tablet by mouth daily (with breakfast)    FLUTICASONE (FLONASE) 50 MCG/ACT NASAL SPRAY    1 spray by Each Nostril route daily    IBUPROFEN (ADVIL;MOTRIN) 800 MG TABLET    Take 1 tablet by mouth every 8 hours    PRENATAL MV-MIN-FE FUM-FA-DHA (PRENATAL 1 PO)    Take by mouth    PROBIOTIC, LACTOBACILLUS, PO    Take by mouth       ALLERGIES     Keflex [cephalexin], Penicillins, and Penicillin g    FAMILY HISTORY       Family History   Problem Relation Age of Onset    Breast Cancer Maternal Grandmother [address] [facility]     Cancer Paternal Grandmother         Female-spread to other organs     Brain Cancer Other         Maternal Great Uncle           SOCIAL HISTORY       Social History     Socioeconomic History    Marital status:      Spouse name: None    Number of children: None    Years of education: None    Highest education level: None   Occupational History    None   Tobacco Use    Smoking status: Never Smoker    Smokeless tobacco: Never Used   Vaping Use    Vaping Use: Never used   Substance and Sexual Activity    Alcohol use: No     Alcohol/week: 0.0 standard drinks    Drug use: No    Sexual activity: Yes     Partners: Male   Other Topics Concern    None   Social History Narrative    None     Social Determinants of Health     Financial Resource Strain:     Difficulty of Paying Living Expenses: Not on file   Food Insecurity:     Worried About Running Out of Food in the Last Year: Not on file    Ora of Food in the Last Year: Not on file   Transportation Needs:     Lack of Transportation (Medical): Not on file    Lack of Transportation (Non-Medical):  Not on file   Physical Activity:     Days of Exercise per Week: Not on file    Minutes of Exercise per Session: Not on file   Stress:     Feeling of Stress : Not on file   Social Connections:     Frequency of Communication with Friends and Family: Not on file    Frequency of Social Gatherings with Friends and Family: Not on file    Attends Buddhist Services: Not on file    Active Member of [address] [place] [address] or Organizations: Not on file    Attends Club or Organization Meetings: Not on file    Marital Status: Not on file   Intimate Partner Violence:     Fear of Current or Ex-Partner: Not on file    Emotionally Abused: Not on file    Physically Abused: Not on file    Sexually Abused: Not on file   Housing Stability:     Unable to Pay for Housing in the Last Year: Not on file    Number of [place] in the Last Year: Not on file    Unstable Housing in the Last Year: Not on file       SCREENINGS    Renny Coma Scale  Eye Opening: Spontaneous  Best Verbal Response: Oriented  Best Motor Response: Obeys commands  Renny Coma Scale Score: 15      PHYSICAL EXAM    (up to 7 forlevel 4, 8 or more for level 5)     ED Triage Vitals [03/14/22 1125]   BP Temp Temp Source Pulse Resp SpO2 Height Weight   (!) 137/94 98.7 °F (37.1 °C) Oral 76 18 95 % 5' 3\" (1.6 m) 235 lb (106.6 kg)       Physical Exam  Vitals and nursing note reviewed. Constitutional:       General: She is not in acute distress. Appearance: She is well-developed. She is not diaphoretic. HENT:      Head: Normocephalic and atraumatic. Right Ear: External ear normal.      Left Ear: External ear normal.      Mouth/Throat:      Pharynx: No oropharyngeal exudate. Eyes:      General:         Right eye: No discharge. Left eye: No discharge. Pupils: Pupils are equal, round, and reactive to light. Neck:      Thyroid: No thyromegaly. Cardiovascular:      Rate and Rhythm: Normal rate and regular rhythm. Heart sounds: Normal heart sounds. No murmur heard. No friction rub. Pulmonary:      Effort: Pulmonary effort is normal. No respiratory distress. Breath sounds: Normal breath sounds. No stridor. No wheezing. Abdominal:      General: Bowel sounds are normal. There is no distension. Palpations: Abdomen is soft. Tenderness: There is no abdominal tenderness. Musculoskeletal:         General: Normal range of motion. Cervical back: Normal range of motion and neck supple. Skin:     General: Skin is warm and dry. Capillary Refill: Capillary refill takes less than 2 seconds. Findings: No rash. Neurological:      General: No focal deficit present. Mental Status: She is alert and oriented to person, place, and time. Mental status is at baseline. GCS: GCS eye subscore is 4. GCS verbal subscore is 5. GCS motor subscore is 6. Cranial Nerves: Cranial nerves are intact. No cranial nerve deficit. Sensory: Sensation is intact. No sensory deficit. Motor: Motor function is intact. No weakness. Coordination: Coordination is intact. Coordination normal.      Gait: Gait normal.      Deep Tendon Reflexes: Reflexes are normal and symmetric.  Reflexes normal.      Comments: Baseline confirmed per  who is present   Psychiatric: Behavior: Behavior normal.         Thought Content: Thought content normal.           DIAGNOSTIC RESULTS     RADIOLOGY:   Non-plain film images such as CT, Ultrasound and MRI are read by the radiologist. Plain radiographic images are visualized and preliminarilyinterpreted by No att. providers found with the below findings:      Interpretation per the Radiologist below, if available at the time of this note:    No orders to display       LABS:  Labs Reviewed - No data to display    All other labs were within normal range or notreturned as of this dictation. RE-ASSESSMENT          EMERGENCY DEPARTMENT COURSE and DIFFERENTIAL DIAGNOSIS/MDM:   Vitals:    Vitals:    03/14/22 1125 03/14/22 1245   BP: (!) 137/94 (!) 136/94   Pulse: 76 84   Resp: 18 18   Temp: 98.7 °F (37.1 °C)    TempSrc: Oral    SpO2: 95% 95%   Weight: 235 lb (106.6 kg)    Height: 5' 3\" (1.6 m)        MDM  Patient is feeling much better after her procedure with a normal neuro exam at this time she will continue with home interventions as needed and understands to follow closely with OB/GYN. At this time she is able to sit upright ambulate and passed p.o. challenge for documentation. Plan for discharge. PROCEDURES:    Procedures      FINAL IMPRESSION      1.  Postpartum spinal headache          DISPOSITION/PLAN   DISPOSITION Decision To Discharge 03/14/2022 03:15:51 PM      PATIENT REFERRED TO:  [facility] EM[facility]  [facility]  [phone]    If symptoms worsen      DISCHARGE MEDICATIONS:  New Prescriptions    No medications on file       (Please note that portions of this note were completed with a voice recognition program.  Efforts were made to edit the dictations but occasionallywords are mis-transcribed.)    Deanne Sepulveda [phone], [id number]  03/14/22 3534

## 2022-03-14 NOTE — ANESTHESIA PROCEDURE NOTES
Epidural Blood Patch    Patient location during procedure: holding area  Start time: 3/14/2022 1:50 PM  End time: 3/14/2022 1:58 PM  Reason for block: epidural blood patch  Staffing  Performed: anesthesiologist   Anesthesiologist: Yaquelin Ignacio MD  Preanesthetic Checklist  Completed: patient identified, IV checked, site marked, risks and benefits discussed, surgical consent, monitors and equipment checked, pre-op evaluation, timeout performed, anesthesia consent given, oxygen available and patient being monitored  Epidural  Patient position: sitting  Prep: ChloraPrep  Patient monitoring: continuous pulse ox  Approach: midline  Location: lumbar (1-5)  Injection technique: BRUNO saline  Provider prep: sterile gloves and mask  Needle  Needle type: Tuohy   Needle gauge: 17 G  Needle length: 3.5 in  Needle insertion depth: 6 cm  Catheter type: none  Assessment  Attempts: 1Blood Patch  Location of venous blood draw: arm  Amount of venous blood draw: 20 mL

## 2022-03-21 RX ORDER — NYSTATIN 100000 U/G
CREAM TOPICAL PRN
Qty: 30 G | Refills: 0 | Status: SHIPPED | OUTPATIENT
Start: 2022-03-21 | End: 2022-05-09

## 2022-03-21 RX ORDER — FLUCONAZOLE 150 MG/1
150 TABLET ORAL ONCE
Qty: 1 TABLET | Refills: 0 | Status: SHIPPED | OUTPATIENT
Start: 2022-03-21 | End: 2022-03-21

## 2022-03-23 NOTE — PATIENT INSTRUCTIONS
Patient Education        Vaginal Childbirth: Care Instructions  Overview     Vaginal birth means delivering a baby through the birth canal (vagina). During labor, the uterus tightens (contracts) regularly to thin and open the cervix and to push the baby out through the birth canal.  Your body will slowly heal in the next few weeks. It's easy to get too tired and overwhelmed during the first weeks after your baby is born. Changes in your hormones can shift your mood without warning. You may find it hard to meet the extra demands on your energy and time. Take it easy on yourself. Follow-up care is a key part of your treatment and safety. Be sure to make and go to all appointments, and call your doctor if you are having problems. It's also a good idea to know your test results and keep a list of the medicines you take. How can you care for yourself at home? Vaginal bleeding and cramps  · After delivery, you will have a bloody discharge from your vagina. This will turn pink within a week and then white or yellow after about 10 days. It may last for 2 to 4 weeks or longer, until the uterus has healed. Use sanitary pads until you stop bleeding. Using pads makes it easier to monitor your bleeding. · Don't worry if you pass some blood clots, as long as they are smaller than a golf ball. If you have a tear or stitches in your vaginal area, change the pad at least every 4 hours. This will help prevent soreness and infection. · You may have cramps for the first few days after childbirth. These are normal and occur as the uterus shrinks to normal size. Take an over-the-counter pain medicine, such as acetaminophen (Tylenol), ibuprofen (Advil, Motrin), or naproxen (Aleve), for cramps. Read and follow all instructions on the label. Do not take aspirin, because it can cause more bleeding. · Do not take two or more pain medicines at the same time unless the doctor told you to.  Many pain medicines have acetaminophen, which is Tylenol. Too much acetaminophen (Tylenol) can be harmful. Stitches  · If you have stitches, they will dissolve on their own and don't need to be removed. Follow your doctor's instructions for cleaning the stitched area. · Put ice or a cold pack on your painful area for 10 to 20 minutes at a time, several times a day, for the first few days. Put a thin cloth between the ice and your skin. · Sit in a few inches of warm water (sitz bath) 3 times a day and after bowel movements. The warm water helps with pain and itching. If you don't have a tub, a warm shower might help. Breast fullness  · Your breasts may overfill (engorge) in the first few days after delivery. To help milk flow and to relieve pain, warm your breasts in the shower or by using warm, moist towels before nursing. · If you aren't nursing, don't put warmth on your breasts or touch your breasts. Wear a bra that fits well and use ice until the fullness goes away. This usually takes 2 to 3 days. · Put ice or a cold pack on your breast after nursing to reduce swelling and pain. Put a thin cloth between the ice and your skin. Activity  · Eat a balanced diet. Don't try to lose weight by cutting calories. Keep taking your prenatal vitamins, or take a multivitamin. · Get as much rest as you can. Try to take naps when your baby sleeps during the day. · Get some exercise every day. But don't do any heavy exercise until your doctor says it is okay. · Wait until you are healed (about 4 to 6 weeks) before you have sexual intercourse. Your doctor will tell you when it is okay to have sex. · If you don't want to get pregnant, talk to your doctor about birth control. You can get pregnant even before your period returns. Also, you can get pregnant while you are breastfeeding. Mental health  · It's normal to have some sadness, anxiety, sleeplessness, and mood swings after you go home.  If you feel upset or hopeless for more than a few days or are having trouble doing the things you need to do, talk to your doctor. Constipation and hemorrhoids  · Drink plenty of fluids. If you have kidney, heart, or liver disease and have to limit fluids, talk with your doctor before you increase the amount of fluids you drink. · Eat plenty of fiber each day. Have a bran muffin or bran cereal for breakfast. Try eating a piece of fruit for a mid-afternoon snack. · For painful, itchy hemorrhoids, put ice or a cold pack on the area several times a day for 10 minutes at a time. Follow this by putting a warm compress on the area for another 10 to 20 minutes or by sitting in a shallow, warm bath. When should you call for help? Call 911  anytime you think you may need emergency care. For example, call if:    · You have thoughts of harming yourself, your baby, or another person.     · You passed out (lost consciousness).     · You have chest pain, are short of breath, or cough up blood.     · You have a seizure. Call your doctor now or seek immediate medical care if:    · You have severe vaginal bleeding.     · You are dizzy or lightheaded, or you feel like you may faint.     · You have a fever.     · You have new or more pain in your belly or pelvis.     · You have symptoms of a blood clot in your leg (called a deep vein thrombosis), such as:  ? Pain in the calf, back of the knee, thigh, or groin. ? Redness and swelling in your leg or groin.     · You have signs of preeclampsia, such as:  ? Sudden swelling of your face, hands, or feet. ? New vision problems (such as dimness, blurring, or seeing spots). ? A severe headache. Watch closely for changes in your health, and be sure to contact your doctor if:    · Your vaginal bleeding seems to be getting heavier.     · You have new or worse vaginal discharge.     · You feel sad, anxious, or hopeless for more than a few days.     · You do not get better as expected. Where can you learn more? Go to https://carmencita.[facility]-partners. org and sign in to your [employer]e account. Enter W152 in the BasisCode box to learn more about \"Vaginal Childbirth: Care Instructions. \"     If you do not have an account, please click on the \"Sign Up Now\" link. Current as of: June 16, 2021               Content Version: 13.1  © 2006-2021 [employer]. Care instructions adapted under license by [facility] ([facility]). If you have questions about a medical condition or this instruction, always ask your healthcare professional. [place] [address] any warranty or liability for your use of this information. Patient Education        Vaginal Childbirth: Care Instructions  Overview     Vaginal birth means delivering a baby through the birth canal (vagina). During labor, the uterus tightens (contracts) regularly to thin and open the cervix and to push the baby out through the birth canal.  Your body will slowly heal in the next few weeks. It's easy to get too tired and overwhelmed during the first weeks after your baby is born. Changes in your hormones can shift your mood without warning. You may find it hard to meet the extra demands on your energy and time. Take it easy on yourself. Follow-up care is a key part of your treatment and safety. Be sure to make and go to all appointments, and call your doctor if you are having problems. It's also a good idea to know your test results and keep a list of the medicines you take. How can you care for yourself at home? Vaginal bleeding and cramps  · After delivery, you will have a bloody discharge from your vagina. This will turn pink within a week and then white or yellow after about 10 days. It may last for 2 to 4 weeks or longer, until the uterus has healed. Use sanitary pads until you stop bleeding. Using pads makes it easier to monitor your bleeding. · Don't worry if you pass some blood clots, as long as they are smaller than a golf ball.  If you have a tear or stitches in your vaginal area, change the pad at least every 4 hours. This will help prevent soreness and infection. · You may have cramps for the first few days after childbirth. These are normal and occur as the uterus shrinks to normal size. Take an over-the-counter pain medicine, such as acetaminophen (Tylenol), ibuprofen (Advil, Motrin), or naproxen (Aleve), for cramps. Read and follow all instructions on the label. Do not take aspirin, because it can cause more bleeding. · Do not take two or more pain medicines at the same time unless the doctor told you to. Many pain medicines have acetaminophen, which is Tylenol. Too much acetaminophen (Tylenol) can be harmful. Stitches  · If you have stitches, they will dissolve on their own and don't need to be removed. Follow your doctor's instructions for cleaning the stitched area. · Put ice or a cold pack on your painful area for 10 to 20 minutes at a time, several times a day, for the first few days. Put a thin cloth between the ice and your skin. · Sit in a few inches of warm water (sitz bath) 3 times a day and after bowel movements. The warm water helps with pain and itching. If you don't have a tub, a warm shower might help. Breast fullness  · Your breasts may overfill (engorge) in the first few days after delivery. To help milk flow and to relieve pain, warm your breasts in the shower or by using warm, moist towels before nursing. · If you aren't nursing, don't put warmth on your breasts or touch your breasts. Wear a bra that fits well and use ice until the fullness goes away. This usually takes 2 to 3 days. · Put ice or a cold pack on your breast after nursing to reduce swelling and pain. Put a thin cloth between the ice and your skin. Activity  · Eat a balanced diet. Don't try to lose weight by cutting calories. Keep taking your prenatal vitamins, or take a multivitamin. · Get as much rest as you can. Try to take naps when your baby sleeps during the day.   · Get some exercise every day. But don't do any heavy exercise until your doctor says it is okay. · Wait until you are healed (about 4 to 6 weeks) before you have sexual intercourse. Your doctor will tell you when it is okay to have sex. · If you don't want to get pregnant, talk to your doctor about birth control. You can get pregnant even before your period returns. Also, you can get pregnant while you are breastfeeding. Mental health  · It's normal to have some sadness, anxiety, sleeplessness, and mood swings after you go home. If you feel upset or hopeless for more than a few days or are having trouble doing the things you need to do, talk to your doctor. Constipation and hemorrhoids  · Drink plenty of fluids. If you have kidney, heart, or liver disease and have to limit fluids, talk with your doctor before you increase the amount of fluids you drink. · Eat plenty of fiber each day. Have a bran muffin or bran cereal for breakfast. Try eating a piece of fruit for a mid-afternoon snack. · For painful, itchy hemorrhoids, put ice or a cold pack on the area several times a day for 10 minutes at a time. Follow this by putting a warm compress on the area for another 10 to 20 minutes or by sitting in a shallow, warm bath. When should you call for help? Call 911  anytime you think you may need emergency care. For example, call if:    · You have thoughts of harming yourself, your baby, or another person.     · You passed out (lost consciousness).     · You have chest pain, are short of breath, or cough up blood.     · You have a seizure.    Call your doctor now or seek immediate medical care if:    · You have severe vaginal bleeding.     · You are dizzy or lightheaded, or you feel like you may faint.     · You have a fever.     · You have new or more pain in your belly or pelvis.     · You have symptoms of a blood clot in your leg (called a deep vein thrombosis), such as:  ? Pain in the calf, back of the knee, thigh, or groin.  ? Redness and swelling in your leg or groin.     · You have signs of preeclampsia, such as:  ? Sudden swelling of your face, hands, or feet. ? New vision problems (such as dimness, blurring, or seeing spots). ? A severe headache. Watch closely for changes in your health, and be sure to contact your doctor if:    · Your vaginal bleeding seems to be getting heavier.     · You have new or worse vaginal discharge.     · You feel sad, anxious, or hopeless for more than a few days.     · You do not get better as expected. Where can you learn more? Go to https://MobileReactorpepiceweb.healthConcealium Software. org and sign in to your Int[employer]ace account. Enter V992 in the Sense Platform box to learn more about \"Vaginal Childbirth: Care Instructions. \"     If you do not have an account, please click on the \"Sign Up Now\" link. Current as of: June 16, 2021               Content Version: 13.1  © 2006-2021 [employer]. Care instructions adapted under license by [facility] ([facility]). If you have questions about a medical condition or this instruction, always ask your healthcare professional. [place] [address] any warranty or liability for your use of this information.

## 2022-03-24 ENCOUNTER — POSTPARTUM VISIT (OUTPATIENT)
Dept: OBGYN CLINIC | Age: 32
End: 2022-03-24

## 2022-03-24 VITALS
SYSTOLIC BLOOD PRESSURE: 130 MMHG | HEIGHT: 63 IN | HEART RATE: 115 BPM | WEIGHT: 211 LBS | DIASTOLIC BLOOD PRESSURE: 80 MMHG | BODY MASS INDEX: 37.39 KG/M2

## 2022-03-24 PROCEDURE — S3005 EVAL SELF-ASSESS DEPRESSION: HCPCS | Performed by: NURSE PRACTITIONER

## 2022-03-24 PROCEDURE — 99024 POSTOP FOLLOW-UP VISIT: CPT | Performed by: NURSE PRACTITIONER

## 2022-03-24 RX ORDER — FLUCONAZOLE 100 MG/1
100 TABLET ORAL DAILY
Qty: 7 TABLET | Refills: 0 | Status: SHIPPED | OUTPATIENT
Start: 2022-03-24 | End: 2022-03-31

## 2022-03-24 RX ORDER — SPIRONOLACTONE 100 MG/1
100 TABLET, FILM COATED ORAL DAILY
Qty: 30 TABLET | Refills: 0 | Status: SHIPPED | OUTPATIENT
Start: 2022-03-24

## 2022-03-24 ASSESSMENT — ENCOUNTER SYMPTOMS
RESPIRATORY NEGATIVE: 1
GASTROINTESTINAL NEGATIVE: 1
BACK PAIN: 0
ALLERGIC/IMMUNOLOGIC NEGATIVE: 1
EYES NEGATIVE: 1

## 2022-03-24 NOTE — PROGRESS NOTES
[facility]  CNM Office Note    David You is a 32 y.o. female who presents today for her medical conditions/ complaints as noted below. Chief Complaint   Patient presents with    Postpartum Care     Patient had a vaginal delivery on 03/10/22. HPI   Pt presents for 2wk PP visit. She is breastfeeding with minimal supplementation. Both pt and infant currently have thrush and are being treated with nystatin. She did end up with spinal HA and had blood patch. No further HA's or visual changes. Patient Active Problem List   Diagnosis    Hypertension    PIH (pregnancy induced hypertension), third trimester    NST (non-stress test) reactive    Term pregnancy    Lump or mass in breast    Diffuse cystic mastopathy    Kidney stone    Right ureteral calculus    Renal colic on right side    Postoperative pain    27 weeks gestation of pregnancy     contractions     labor in third trimester without delivery    Normal labor    36 weeks gestation of pregnancy    Gestational hypertension, antepartum    Gestational diabetes mellitus (GDM) affecting second pregnancy     (normal spontaneous vaginal delivery)       Patient's last menstrual period was 2021 (exact date).   [id number]    Past Medical History:   Diagnosis Date    Acne     ADHD     Asthma     Breast disorder     Augmentation    Hypertension 2018    Hypoglycemia     Mental disorder     Anxiety and Depression    PCOS (polycystic ovarian syndrome)      Past Surgical History:   Procedure Laterality Date    BREAST BIOPSY Left     BREAST ENHANCEMENT SURGERY      CYSTOSCOPY Right 2020    RIGHT URETEROSCOPY LASER LITHOTRIPSY performed by Beatrice Francis MD at [facility] Right 2020    STENT PLACEMENT performed by Beatrice Francis MD at [place] [address] EXTRACTION       Family History   Problem Relation Age of Onset    Breast Cancer Maternal Grandmother 36 Appearance: She is well-developed. She is not diaphoretic. HENT:      Head: Normocephalic and atraumatic. Nose: Nose normal.   Eyes:      Conjunctiva/sclera: Conjunctivae normal.      Pupils: Pupils are equal, round, and reactive to light. Neck:      Thyroid: No thyromegaly. Trachea: No tracheal deviation. Pulmonary:      Effort: Pulmonary effort is normal. No respiratory distress. Musculoskeletal:         General: Normal range of motion. Cervical back: Normal range of motion and neck supple. Comments: Normal ROM for upper and lower extremities. Gait steady. Skin:     General: Skin is warm and dry. Findings: No lesion or rash. Neurological:      Mental Status: She is alert and oriented to person, place, and time. Psychiatric:         Speech: Speech normal.         Behavior: Behavior normal.          Diagnosis Orders   1. 2 weeks postpartum follow-up  AL EVAL SELF-ASSESS DEPRESSION   2. Encounter for care and examination of lactating mother     3. Postpartum hypertension         MEDICATIONS:  Orders Placed This Encounter   Medications    fluconazole (DIFLUCAN) 100 MG tablet     Sig: Take 1 tablet by mouth daily for 7 days     Dispense:  7 tablet     Refill:  0    spironolactone (ALDACTONE) 100 MG tablet     Sig: Take 1 tablet by mouth daily     Dispense:  30 tablet     Refill:  0       ORDERS:  Orders Placed This Encounter   Procedures    AL EVAL SELF-ASSESS DEPRESSION       PLAN:  1.  PP- continuation of current restrictions   Yeast - diflucan weekly

## 2022-03-26 ENCOUNTER — HOSPITAL ENCOUNTER (EMERGENCY)
Age: 32
Discharge: HOME | End: 2022-03-26
Attending: EMERGENCY MEDICINE
Payer: COMMERCIAL

## 2022-03-26 VITALS
HEART RATE: 99 BPM | RESPIRATION RATE: 21 BRPM | TEMPERATURE: 97.6 F | DIASTOLIC BLOOD PRESSURE: 85 MMHG | OXYGEN SATURATION: 97 % | SYSTOLIC BLOOD PRESSURE: 117 MMHG

## 2022-03-26 DIAGNOSIS — R03.0 TRANSIENT HYPERTENSION: Primary | ICD-10-CM

## 2022-03-26 LAB
ALBUMIN SERPL-MCNC: 4.5 G/DL (ref 3.5–5.2)
ALP BLD-CCNC: 106 U/L (ref 35–104)
ALT SERPL-CCNC: 15 U/L (ref 5–33)
ANION GAP SERPL CALCULATED.3IONS-SCNC: 13 MMOL/L (ref 7–19)
AST SERPL-CCNC: 14 U/L (ref 5–32)
BACTERIA: ABNORMAL /HPF
BASOPHILS ABSOLUTE: 0 K/UL (ref 0–0.2)
BASOPHILS RELATIVE PERCENT: 0.5 % (ref 0–1)
BILIRUB SERPL-MCNC: <0.2 MG/DL (ref 0.2–1.2)
BILIRUBIN URINE: NEGATIVE
BLOOD, URINE: ABNORMAL
BUN BLDV-MCNC: 20 MG/DL (ref 6–20)
CALCIUM SERPL-MCNC: 10.2 MG/DL (ref 8.6–10)
CHLORIDE BLD-SCNC: 102 MMOL/L (ref 98–111)
CLARITY: CLEAR
CO2: 23 MMOL/L (ref 22–29)
COLOR: YELLOW
CREAT SERPL-MCNC: 0.9 MG/DL (ref 0.5–0.9)
CRYSTALS, UA: ABNORMAL /HPF
EOSINOPHILS ABSOLUTE: 0.3 K/UL (ref 0–0.6)
EOSINOPHILS RELATIVE PERCENT: 3.1 % (ref 0–5)
EPITHELIAL CELLS, UA: ABNORMAL /HPF
GFR AFRICAN AMERICAN: >59
GFR NON-AFRICAN AMERICAN: >60
GLUCOSE BLD-MCNC: 123 MG/DL (ref 74–109)
GLUCOSE URINE: NEGATIVE MG/DL
HCT VFR BLD CALC: 47.4 % (ref 37–47)
HEMOGLOBIN: 15.1 G/DL (ref 12–16)
IMMATURE GRANULOCYTES #: 0 K/UL
KETONES, URINE: NEGATIVE MG/DL
LEUKOCYTE ESTERASE, URINE: ABNORMAL
LYMPHOCYTES ABSOLUTE: 2.6 K/UL (ref 1.1–4.5)
LYMPHOCYTES RELATIVE PERCENT: 30.8 % (ref 20–40)
MCH RBC QN AUTO: 27.8 PG (ref 27–31)
MCHC RBC AUTO-ENTMCNC: 31.9 G/DL (ref 33–37)
MCV RBC AUTO: 87.1 FL (ref 81–99)
MONOCYTES ABSOLUTE: 0.6 K/UL (ref 0–0.9)
MONOCYTES RELATIVE PERCENT: 6.6 % (ref 0–10)
NEUTROPHILS ABSOLUTE: 4.9 K/UL (ref 1.5–7.5)
NEUTROPHILS RELATIVE PERCENT: 58.8 % (ref 50–65)
NITRITE, URINE: NEGATIVE
PDW BLD-RTO: 12.6 % (ref 11.5–14.5)
PH UA: 6 (ref 5–8)
PLATELET # BLD: 233 K/UL (ref 130–400)
PMV BLD AUTO: 9 FL (ref 9.4–12.3)
POTASSIUM SERPL-SCNC: 3.7 MMOL/L (ref 3.5–5)
PROTEIN UA: NEGATIVE MG/DL
RBC # BLD: 5.44 M/UL (ref 4.2–5.4)
RBC UA: ABNORMAL /HPF (ref 0–2)
SODIUM BLD-SCNC: 138 MMOL/L (ref 136–145)
SPECIFIC GRAVITY UA: 1.01 (ref 1–1.03)
TOTAL PROTEIN: 7.2 G/DL (ref 6.6–8.7)
TROPONIN: <0.01 NG/ML (ref 0–0.03)
UROBILINOGEN, URINE: 0.2 E.U./DL
WBC # BLD: 8.3 K/UL (ref 4.8–10.8)
WBC UA: ABNORMAL /HPF (ref 0–5)

## 2022-03-26 PROCEDURE — 84484 ASSAY OF TROPONIN QUANT: CPT

## 2022-03-26 PROCEDURE — 87086 URINE CULTURE/COLONY COUNT: CPT

## 2022-03-26 PROCEDURE — 36415 COLL VENOUS BLD VENIPUNCTURE: CPT

## 2022-03-26 PROCEDURE — 99283 EMERGENCY DEPT VISIT LOW MDM: CPT

## 2022-03-26 PROCEDURE — 93005 ELECTROCARDIOGRAM TRACING: CPT | Performed by: EMERGENCY MEDICINE

## 2022-03-26 PROCEDURE — 85025 COMPLETE CBC W/AUTO DIFF WBC: CPT

## 2022-03-26 PROCEDURE — 81001 URINALYSIS AUTO W/SCOPE: CPT

## 2022-03-26 PROCEDURE — 80053 COMPREHEN METABOLIC PANEL: CPT

## 2022-03-26 RX ORDER — NIFEDIPINE 30 MG/1
30 TABLET, EXTENDED RELEASE ORAL DAILY
Qty: 30 TABLET | Refills: 1 | Status: SHIPPED | OUTPATIENT
Start: 2022-03-26 | End: 2022-05-09

## 2022-03-27 NOTE — ED PROVIDER NOTES
[facility] - [facility] EMERGENCY DEPT  eMERGENCY dEPARTMENT eNCOUnter      Pt Name: Kristal Bolivar  MRN: [id number]  [place] 1990  Date of evaluation: 3/26/2022  Provider: Ad Fraser MD    CHIEF COMPLAINT       Chief Complaint   Patient presents with    Hypertension     2 weeks postpartum         HISTORY OF PRESENT ILLNESS   (Location/Symptom, Timing/Onset,Context/Setting, Quality, Duration, Modifying Factors, Severity)  Note limiting factors. Kristal Bolivar is a 32 y.o. female who presents to the emergency department for evaluation regarding elevated blood pressure. Patient is 2 weeks status post . States that she has been following her blood pressure has been elevated as high as 140s to 150s. She was recently started on Procardia as an outpatient by her OB/GYN. Also states that she has had a mild severity headache. States that she has not had significant swelling of her lower extremities. She did not really have any significant issues with blood pressure during her pregnancy, they just watched it fairly close. HPI    NursingNotes were reviewed. REVIEW OF SYSTEMS    (2-9 systems for level 4, 10 or more for level 5)     Review of Systems   Constitutional: Negative for chills and fever. Respiratory: Negative for shortness of breath. Cardiovascular: Negative for chest pain and palpitations. Gastrointestinal: Negative for abdominal distention, abdominal pain and vomiting. Neurological: Positive for headaches. All other systems reviewed and are negative.            PAST MEDICALHISTORY     Past Medical History:   Diagnosis Date    Acne     ADHD     Asthma     Breast disorder     Augmentation    Hypertension 2018    Hypoglycemia     Mental disorder     Anxiety and Depression    PCOS (polycystic ovarian syndrome)          SURGICAL HISTORY       Past Surgical History:   Procedure Laterality Date    BREAST BIOPSY Left     BREAST ENHANCEMENT SURGERY      CYSTOSCOPY Right 2020 RIGHT URETEROSCOPY LASER LITHOTRIPSY performed by Suzan R[facility]os MD at [facility] 4/8/2020    STENT PLACEMENT performed by Suzan Ríos MD at [address] [place] [address]     Discharge Medication List as of 3/26/2022 10:57 PM      CONTINUE these medications which have NOT CHANGED    Details   NIFEdipine (PROCARDIA XL) 30 MG extended release tablet Take 1 tablet by mouth daily, Disp-30 tablet, R-1Normal      fluconazole (DIFLUCAN) 100 MG tablet Take 1 tablet by mouth daily for 7 days, Disp-7 tablet, R-0Normal      spironolactone (ALDACTONE) 100 MG tablet Take 1 tablet by mouth daily, Disp-30 tablet, R-0Normal      nystatin (MYCOSTATIN) 137865 UNIT/GM cream Apply topically as needed for Dry Skin Apply topically 2 times daily. , Topical, PRN Starting Mon 3/21/2022, Disp-30 g, R-0, Normal      cetirizine (ZYRTEC) 5 MG tablet Take 5 mg by mouth dailyHistorical Med      PROBIOTIC, LACTOBACILLUS, PO Take by mouthHistorical Med      Choline Dihydrogen Citrate (CHOLINE CITRATE PO) Take by mouthHistorical Med      Prenatal MV-Min-Fe Fum-FA-DHA (PRENATAL 1 PO) Take by mouthHistorical Med             ALLERGIES     Keflex [cephalexin], Penicillins, and Penicillin g    FAMILY HISTORY       Family History   Problem Relation Age of Onset    Breast Cancer Maternal Grandmother [facility]     Cancer Paternal Grandmother         Female-spread to other organs     Brain Cancer Other         Maternal Great Uncle           SOCIAL HISTORY       Social History     Socioeconomic History    Marital status:      Spouse name: None    Number of children: None    Years of education: None    Highest education level: None   Occupational History    None   Tobacco Use    Smoking status: Never Smoker    Smokeless tobacco: Never Used   Vaping Use    Vaping Use: Never used   Substance and Sexual Activity    Alcohol use: No     Alcohol/week: 0.0 standard drinks  Drug use: No    Sexual activity: Yes     Partners: Male   Other Topics Concern    None   Social History Narrative    None     Social Determinants of Health     Financial Resource Strain:     Difficulty of Paying Living Expenses: Not on file   Food Insecurity:     Worried About Running Out of Food in the Last Year: Not on file    Ora of Food in the Last Year: Not on file   Transportation Needs:     Lack of Transportation (Medical): Not on file    Lack of Transportation (Non-Medical): Not on file   Physical Activity:     Days of Exercise per Week: Not on file    Minutes of Exercise per Session: Not on file   Stress:     Feeling of Stress : Not on file   Social Connections:     Frequency of Communication with Friends and Family: Not on file    Frequency of Social Gatherings with Friends and Family: Not on file    Attends [employer] Services: Not on file    Active Member of [address] [place] [address] [employer] or Organizations: Not on file    Attends Club or Organization Meetings: Not on file    Marital Status: Not on file   Intimate Partner Violence:     Fear of Current or Ex-Partner: Not on file    Emotionally Abused: Not on file    Physically Abused: Not on file    Sexually Abused: Not on file   Housing Stability:     Unable to Pay for Housing in the Last Year: Not on file    Number of [place] in the Last Year: Not on file    Unstable Housing in the Last Year: Not on file       SCREENINGS    Paola Coma Scale  Eye Opening: Spontaneous  Best Verbal Response: Oriented  Best Motor Response: Obeys commands  Renny Coma Scale Score: 15        PHYSICAL EXAM    (up to 7 for level 4, 8 or more for level 5)     ED Triage Vitals [03/26/22 2035]   BP Temp Temp Source Pulse Resp SpO2 Height Weight   (!) 150/100 97.6 °F (36.4 °C) Infrared 124 17 97 % -- --       Physical Exam  Vitals and nursing note reviewed. HENT:      Head: Atraumatic.       Mouth/Throat:      Mouth: Mucous membranes are moist. Mucous membranes are not dry.   Eyes:      General: No scleral icterus. Pupils: Pupils are equal, round, and reactive to light. Neck:      Trachea: No tracheal deviation. Cardiovascular:      Rate and Rhythm: Normal rate and regular rhythm. Pulses: Normal pulses. Heart sounds: Normal heart sounds. No murmur heard. Pulmonary:      Effort: Pulmonary effort is normal. No respiratory distress. Breath sounds: Normal breath sounds. No stridor. Abdominal:      General: There is no distension. Palpations: Abdomen is soft. Tenderness: There is no abdominal tenderness. There is no guarding. Skin:     Capillary Refill: Capillary refill takes less than 2 seconds. Coloration: Skin is not pale. Findings: No rash. Neurological:      Mental Status: She is alert and oriented to person, place, and time. Psychiatric:         Behavior: Behavior is cooperative. DIAGNOSTIC RESULTS     EKG: All EKG's areinterpreted by the Emergency Department Physician who either signs or Co-signs this chart in the absence of a cardiologist.    2140: Sinus tachycardia at 114, no evidence of acute ST elevation identified. QTc: 396 MS.         LABS:  Labs Reviewed   CBC WITH AUTO DIFFERENTIAL - Abnormal; Notable for the following components:       Result Value    RBC 5.44 (*)     Hematocrit 47.4 (*)     MCHC 31.9 (*)     MPV 9.0 (*)     All other components within normal limits   COMPREHENSIVE METABOLIC PANEL - Abnormal; Notable for the following components:    Glucose 123 (*)     Calcium 10.2 (*)     Alkaline Phosphatase 106 (*)     All other components within normal limits   URINALYSIS WITH REFLEX TO CULTURE - Abnormal; Notable for the following components:    Blood, Urine LARGE (*)     Leukocyte Esterase, Urine MODERATE (*)     All other components within normal limits   MICROSCOPIC URINALYSIS - Abnormal; Notable for the following components:    Bacteria, UA 1+ (*)     Crystals, UA NEG (*)     All other components within normal limits   CULTURE, URINE    Narrative:     ORDER#: [id number]                          ORDERED BY: Kari Almanza  SOURCE: Urine Clean Catch                  COLLECTED:  03/26/22 21:35  ANTIBIOTICS AT [place].:                      RECEIVED :  03/26/22 21:39   TROPONIN       All other labs were within normal range or not returned as of this dictation. EMERGENCY DEPARTMENT COURSE and DIFFERENTIAL DIAGNOSIS/MDM:   Vitals:    Vitals:    03/26/22 2035 03/26/22 2210 03/26/22 2232   BP: (!) 150/100 (!) 123/91 117/85   Pulse: 124 107 99   Resp: 17 18 21   Temp: 97.6 °F (36.4 °C)     TempSrc: Infrared     SpO2: 97% 95% 97%       MDM    Reassessment    Her laboratory studies look okay. Urinalysis negative for evidence of proteinuria. Her blood pressure has improved here in the ED without medication intervention. Currently running 123/91. I have encouraged close outpatient follow-up with her PMD for ongoing blood pressure evaluation and additional medication adjustment as needed. PROCEDURES:  Unless otherwise noted below, none     Procedures    FINAL IMPRESSION      1.  Transient hypertension          DISPOSITION/PLAN   DISPOSITION Decision To Discharge 03/26/2022 10:48:10 PM      PATIENT REFERRED TO:  Shankar Meza MD  [address] [place] [address],[address]  [phone]            DISCHARGE MEDICATIONS:  Discharge Medication List as of 3/26/2022 10:57 PM             (Please note that portions of this note were completed with a voice recognition program.  Efforts were made to edit thedictations but occasionally words are mis-transcribed.)    Kenan Noble MD (electronically signed)  Attending Emergency Physician         Kenan Noble MD  04/15/22 0603

## 2022-03-28 DIAGNOSIS — G44.229 CHRONIC TENSION-TYPE HEADACHE, NOT INTRACTABLE: Primary | ICD-10-CM

## 2022-03-28 LAB
EKG P AXIS: 49 DEGREES
EKG P-R INTERVAL: 156 MS
EKG Q-T INTERVAL: 302 MS
EKG QRS DURATION: 84 MS
EKG QTC CALCULATION (BAZETT): 396 MS
EKG T AXIS: 57 DEGREES

## 2022-03-28 PROCEDURE — 93010 ELECTROCARDIOGRAM REPORT: CPT | Performed by: INTERNAL MEDICINE

## 2022-03-28 RX ORDER — BUTALBITAL, ACETAMINOPHEN AND CAFFEINE 50; 325; 40 MG/1; MG/1; MG/1
1 TABLET ORAL EVERY 4 HOURS PRN
Qty: 180 TABLET | Refills: 3 | Status: SHIPPED | OUTPATIENT
Start: 2022-03-28 | End: 2022-05-09

## 2022-03-29 LAB — URINE CULTURE, ROUTINE: NORMAL

## 2022-04-15 ASSESSMENT — ENCOUNTER SYMPTOMS
ABDOMINAL DISTENTION: 0
SHORTNESS OF BREATH: 0
VOMITING: 0
ABDOMINAL PAIN: 0

## 2022-04-20 RX ORDER — IBUPROFEN 800 MG/1
800 TABLET ORAL EVERY 8 HOURS
Qty: 120 TABLET | Refills: 0 | OUTPATIENT
Start: 2022-04-20

## 2022-04-22 ENCOUNTER — POSTPARTUM VISIT (OUTPATIENT)
Dept: OBGYN CLINIC | Age: 32
End: 2022-04-22

## 2022-04-22 VITALS
DIASTOLIC BLOOD PRESSURE: 87 MMHG | HEART RATE: 92 BPM | BODY MASS INDEX: 36.67 KG/M2 | SYSTOLIC BLOOD PRESSURE: 126 MMHG | WEIGHT: 207 LBS

## 2022-04-22 DIAGNOSIS — F41.1 GENERALIZED ANXIETY DISORDER: ICD-10-CM

## 2022-04-22 DIAGNOSIS — I10 PRIMARY HYPERTENSION: Primary | ICD-10-CM

## 2022-04-22 PROCEDURE — S3005 EVAL SELF-ASSESS DEPRESSION: HCPCS | Performed by: NURSE PRACTITIONER

## 2022-04-22 PROCEDURE — 0503F POSTPARTUM CARE VISIT: CPT | Performed by: NURSE PRACTITIONER

## 2022-04-22 RX ORDER — CARVEDILOL 6.25 MG/1
TABLET ORAL
COMMUNITY
Start: 2022-03-29

## 2022-04-22 RX ORDER — ESCITALOPRAM OXALATE 10 MG/1
20 TABLET ORAL
COMMUNITY
Start: 2022-03-29

## 2022-04-22 ASSESSMENT — ENCOUNTER SYMPTOMS
ALLERGIC/IMMUNOLOGIC NEGATIVE: 1
GASTROINTESTINAL NEGATIVE: 1
RESPIRATORY NEGATIVE: 1
EYES NEGATIVE: 1

## 2022-04-22 NOTE — PATIENT INSTRUCTIONS
Patient Education        Vaginal Childbirth: Care Instructions  Overview     Vaginal birth means delivering a baby through the birth canal (vagina). During labor, the uterus tightens (contracts) regularly to thin and open the cervixand to push the baby out through the birth canal.  Your body will slowly heal in the next few weeks. It's easy to get too tired and overwhelmed during the first weeks after your baby is born. Changes in your hormones can shift your mood without warning. You may find it hard to meet theextra demands on your energy and time. Take it easy on yourself. Follow-up care is a key part of your treatment and safety. Be sure to make and go to all appointments, and call your doctor if you are having problems. It's also a good idea to know your test results and keep alist of the medicines you take. How can you care for yourself at home? Vaginal bleeding and cramps   After delivery, you will have a bloody discharge from your vagina. This will turn pink within a week and then white or yellow after about 10 days. It may last for 2 to 4 weeks or longer, until the uterus has healed. Use sanitary pads until you stop bleeding. Using pads makes it easier to monitor your bleeding.  Don't worry if you pass some blood clots, as long as they are smaller than a golf ball. If you have a tear or stitches in your vaginal area, change the pad at least every 4 hours. This will help prevent soreness and infection.  You may have cramps for the first few days after childbirth. These are normal and occur as the uterus shrinks to normal size. Take an over-the-counter pain medicine, such as acetaminophen (Tylenol), ibuprofen (Advil, Motrin), or naproxen (Aleve), for cramps. Read and follow all instructions on the label. Do not take aspirin, because it can cause more bleeding.  Do not take two or more pain medicines at the same time unless the doctor told you to.  Many pain medicines have acetaminophen, which is Tylenol. Too much acetaminophen (Tylenol) can be harmful. Stitches   If you have stitches, they will dissolve on their own and don't need to be removed. Follow your doctor's instructions for cleaning the stitched area.  Put ice or a cold pack on your painful area for 10 to 20 minutes at a time, several times a day, for the first few days. Put a thin cloth between the ice and your skin.  Sit in a few inches of warm water (sitz bath) 3 times a day and after bowel movements. The warm water helps with pain and itching. If you don't have a tub, a warm shower might help. Breast fullness   Your breasts may overfill (engorge) in the first few days after delivery. To help milk flow and to relieve pain, warm your breasts in the shower or by using warm, moist towels before nursing.  If you aren't nursing, don't put warmth on your breasts or touch your breasts. Wear a bra that fits well and use ice until the fullness goes away. This usually takes 2 to 3 days.  Put ice or a cold pack on your breast after nursing to reduce swelling and pain. Put a thin cloth between the ice and your skin. Activity   Eat a balanced diet. Don't try to lose weight by cutting calories. Keep taking your prenatal vitamins, or take a multivitamin.  Get as much rest as you can. Try to take naps when your baby sleeps during the day.  Get some exercise every day. But don't do any heavy exercise until your doctor says it is okay.  Wait until you are healed (about 4 to 6 weeks) before you have sexual intercourse. Your doctor will tell you when it is okay to have sex.  If you don't want to get pregnant, talk to your doctor about birth control. You can get pregnant even before your period returns. Also, you can get pregnant while you are breastfeeding. Mental health   It's normal to have some sadness, anxiety, sleeplessness, and mood swings after you go home.  If you feel upset or hopeless for more than a few days or are having trouble doing the things you need to do, talk to your doctor. Constipation and hemorrhoids   Drink plenty of fluids. If you have kidney, heart, or liver disease and have to limit fluids, talk with your doctor before you increase the amount of fluids you drink.  Eat plenty of fiber each day. Have a bran muffin or bran cereal for breakfast. Try eating a piece of fruit for a mid-afternoon snack.  For painful, itchy hemorrhoids, put ice or a cold pack on the area several times a day for 10 minutes at a time. Follow this by putting a warm compress on the area for another 10 to 20 minutes or by sitting in a shallow, warm bath. When should you call for help? Share this information with your partner, family, or a friend. They can help you watch for warning signs. Call 911 anytime you think you may need emergency care. For example, call if:     You have thoughts of harming yourself, your baby, or another person.      You passed out (lost consciousness).      You have chest pain, are short of breath, or cough up blood.      You have a seizure. Call your doctor now or seek immediate medical care if:     You have signs of hemorrhage (too much bleeding), such as:  ? Heavy vaginal bleeding. This means that you are soaking through one or more pads in an hour. Or you pass blood clots bigger than an egg. ? Feeling dizzy or lightheaded, or you feel like you may faint. ? Feeling so tired or weak that you cannot do your usual activities. ? A fast or irregular heartbeat. ? New or worse belly pain.      You have signs of infection, such as:  ? A fever. ? Vaginal discharge that smells bad.  ? New or worse belly pain.      You have symptoms of a blood clot in your leg (called a deep vein thrombosis), such as:  ? Pain in the calf, back of the knee, thigh, or groin. ? Redness and swelling in your leg or groin.      You have signs of preeclampsia, such as:  ? Sudden swelling of your face, hands, or feet.   ? New vision problems (such as dimness, blurring, or seeing spots). ? A severe headache. Watch closely for changes in your health, and be sure to contact your doctor if:     Your vaginal bleeding isn't decreasing.      You feel sad, anxious, or hopeless for more than a few days.      You are having problems with your breasts or breastfeeding. Where can you learn more? Go to https://Stamp.itpepiceweb.healthApplied [employer]etics Technologies Corporation. org and sign in to your [employer] account. Enter H837 in the [employer] box to learn more about \"Vaginal Childbirth: Care Instructions. \"     If you do not have an account, please click on the \"Sign Up Now\" link. Current as of: June 16, 2021               Content Version: 13.2  © 2006-2022 Healthwise, Incorporated. Care instructions adapted under license by [facility] ([facility]). If you have questions about a medical condition or this instruction, always ask your healthcare professional. [place] [address] any warranty or liability for your use of this information.

## 2022-04-22 NOTE — PROGRESS NOTES
[facility]  CNM Office Note    Efrem Ojeda is a 32 y.o. female who presents today for her medical conditions/ complaints as noted below. Chief Complaint   Patient presents with    Postpartum Care         HPI   Pt is here for 6 week PP, she is now taking BP medication and doing well on it. She is pumping twice a day now, she denies birth control due to her BP and spouse will be having vasotomy in May. She is on Lexapro for anxiety, she has noticed her anxiety little more elevated now since baby is colic. Pt scored 3 on depression screening. Did start lexapro and up'd to 20mg. Patient Active Problem List   Diagnosis    Hypertension    PIH (pregnancy induced hypertension), third trimester    NST (non-stress test) reactive    Term pregnancy    Lump or mass in breast    Diffuse cystic mastopathy    Kidney stone    Right ureteral calculus    Renal colic on right side    Postoperative pain    27 weeks gestation of pregnancy     contractions     labor in third trimester without delivery    Normal labor    36 weeks gestation of pregnancy    Gestational hypertension, antepartum    Gestational diabetes mellitus (GDM) affecting second pregnancy     (normal spontaneous vaginal delivery)       Patient's last menstrual period was 2022.   [id number]    Past Medical History:   Diagnosis Date    Acne     ADHD     Asthma     Breast disorder     Augmentation    Hypertension 2018    Hypoglycemia     Mental disorder     Anxiety and Depression    PCOS (polycystic ovarian syndrome)      Past Surgical History:   Procedure Laterality Date    BREAST BIOPSY Left     BREAST ENHANCEMENT SURGERY      CYSTOSCOPY Right 2020    RIGHT URETEROSCOPY LASER LITHOTRIPSY [facility]esh [facility]agon MD at [facility] Right 2020    STENT PLACEMENT performed by Jayesh Mondragon MD at [place] [address] EXTRACTION       Family History Problem Relation Age of Onset    Breast Cancer Maternal Grandmother [address]     Cancer Paternal Grandmother         Female-spread to other organs     Brain Cancer Other         Maternal Great Uncle      Social History     Tobacco Use    Smoking status: Never Smoker    Smokeless tobacco: Never Used   Substance Use Topics    Alcohol use: No     Alcohol/week: 0.0 standard drinks       Current Outpatient Medications   Medication Sig Dispense Refill    escitalopram (LEXAPRO) 10 MG tablet TAKE 1 TABLET BY MOUTH EVERY DAY FOR 90 DAYS      carvedilol (COREG) 6.25 MG tablet TAKE 1 TABLET BY MOUTH TWICE A DAY WITH FOOD FOR 90 DAYS      spironolactone (ALDACTONE) 100 MG tablet Take 1 tablet by mouth daily 30 tablet 0    butalbital-acetaminophen-caffeine (FIORICET, ESGIC) -40 MG per tablet Take 1 tablet by mouth every 4 hours as needed for Headaches (Patient not taking: Reported on 4/22/2022) 180 tablet 3    NIFEdipine (PROCARDIA XL) 30 MG extended release tablet Take 1 tablet by mouth daily (Patient not taking: Reported on 4/22/2022) 30 tablet 1    nystatin (MYCOSTATIN) 046835 UNIT/GM cream Apply topically as needed for Dry Skin Apply topically 2 times daily. (Patient not taking: Reported on 4/22/2022) 30 g 0    cetirizine (ZYRTEC) 5 MG tablet Take 5 mg by mouth daily (Patient not taking: Reported on 4/22/2022)      PROBIOTIC, LACTOBACILLUS, PO Take by mouth (Patient not taking: Reported on 4/22/2022)      Choline Dihydrogen Citrate (CHOLINE CITRATE PO) Take by mouth (Patient not taking: Reported on 4/22/2022)      Prenatal MV-Min-Fe Fum-FA-DHA (PRENATAL 1 PO) Take by mouth (Patient not taking: Reported on 4/22/2022)       No current facility-administered medications for this visit.      Allergies   Allergen Reactions    Keflex [Cephalexin] Anaphylaxis    Penicillins Anaphylaxis and Hives    Penicillin G Hives     Vitals:    04/22/22 1448   BP: 126/87   Pulse: 92     Body mass index is 36.67 kg/m². Review of Systems   Constitutional: Positive for fatigue. HENT: Negative. Eyes: Negative. Respiratory: Negative. Cardiovascular: Negative. Gastrointestinal: Negative. Endocrine: Negative. Genitourinary: Negative. Negative for dysuria, flank pain, menstrual problem, pelvic pain, vaginal bleeding, vaginal discharge and vaginal pain. Musculoskeletal: Negative. Skin: Negative. Allergic/Immunologic: Negative. Neurological: Negative. Hematological: Negative. Psychiatric/Behavioral: Positive for dysphoric mood. The patient is nervous/anxious. Physical Exam  Vitals and nursing note reviewed. Constitutional:       Appearance: She is well-developed. She is not diaphoretic. HENT:      Head: Normocephalic and atraumatic. Nose: Nose normal.   Eyes:      Conjunctiva/sclera: Conjunctivae normal.      Pupils: Pupils are equal, round, and reactive to light. Neck:      Thyroid: No thyromegaly. Trachea: No tracheal deviation. Pulmonary:      Effort: Pulmonary effort is normal. No respiratory distress. Musculoskeletal:         General: Normal range of motion. Cervical back: Normal range of motion and neck supple. Comments: Normal ROM for upper and lower extremities. Gait steady. Skin:     General: Skin is warm and dry. Findings: No lesion or rash. Neurological:      Mental Status: She is alert and oriented to person, place, and time. Psychiatric:         Speech: Speech normal.         Behavior: Behavior normal.          Diagnosis Orders   1. Primary hypertension     2. 6 weeks postpartum follow-up  MA EVAL SELF-ASSESS DEPRESSION   3. Generalized anxiety disorder         MEDICATIONS:  No orders of the defined types were placed in this encounter. ORDERS:  Orders Placed This Encounter   Procedures    MA EVAL SELF-ASSESS DEPRESSION       PLAN:  1. PP 6wks - no further restrictions.  F/u in 3 months for annual exam

## 2022-05-09 RX ORDER — ACETAMINOPHEN AND CODEINE PHOSPHATE 120; 12 MG/5ML; MG/5ML
1 SOLUTION ORAL DAILY
Qty: 28 TABLET | Refills: 1 | Status: SHIPPED | OUTPATIENT
Start: 2022-05-09 | End: 2022-05-31

## 2022-05-17 ENCOUNTER — TRANSCRIBE ORDERS (OUTPATIENT)
Dept: GENERAL RADIOLOGY | Facility: HOSPITAL | Age: 32
End: 2022-05-17

## 2022-05-17 ENCOUNTER — HOSPITAL ENCOUNTER (OUTPATIENT)
Dept: GENERAL RADIOLOGY | Facility: HOSPITAL | Age: 32
Discharge: HOME | End: 2022-05-17
Admitting: NURSE PRACTITIONER

## 2022-05-17 DIAGNOSIS — R30.9 PAIN WITH URINATION: Primary | ICD-10-CM

## 2022-05-17 DIAGNOSIS — R30.0 DYSURIA: ICD-10-CM

## 2022-05-17 DIAGNOSIS — R35.0 URINARY FREQUENCY: ICD-10-CM

## 2022-05-17 DIAGNOSIS — R30.0 DYSURIA: Primary | ICD-10-CM

## 2022-05-17 DIAGNOSIS — M54.9 OTHER ACUTE BACK PAIN: ICD-10-CM

## 2022-05-17 PROCEDURE — 74018 RADEX ABDOMEN 1 VIEW: CPT

## 2022-05-20 RX ORDER — NIFEDIPINE 30 MG/1
TABLET, EXTENDED RELEASE ORAL
Qty: 30 TABLET | Refills: 1 | OUTPATIENT
Start: 2022-05-20

## 2022-05-31 RX ORDER — LEVONORGESTREL AND ETHINYL ESTRADIOL 0.1-0.02MG
1 KIT ORAL DAILY
Qty: 1 PACKET | Refills: 3 | Status: SHIPPED | OUTPATIENT
Start: 2022-05-31 | End: 2022-08-09 | Stop reason: SDUPTHER

## 2022-07-21 ENCOUNTER — OFFICE VISIT (OUTPATIENT)
Dept: OBGYN CLINIC | Age: 32
End: 2022-07-21
Payer: COMMERCIAL

## 2022-07-21 VITALS
SYSTOLIC BLOOD PRESSURE: 128 MMHG | HEART RATE: 101 BPM | WEIGHT: 219 LBS | BODY MASS INDEX: 38.79 KG/M2 | DIASTOLIC BLOOD PRESSURE: 83 MMHG

## 2022-07-21 DIAGNOSIS — Z01.419 WELL WOMAN EXAM WITH ROUTINE GYNECOLOGICAL EXAM: Primary | ICD-10-CM

## 2022-07-21 DIAGNOSIS — Z30.41 ENCOUNTER FOR SURVEILLANCE OF CONTRACEPTIVE PILLS: ICD-10-CM

## 2022-07-21 DIAGNOSIS — Z12.4 SCREENING FOR CERVICAL CANCER: ICD-10-CM

## 2022-07-21 DIAGNOSIS — Z12.39 ENCOUNTER FOR SCREENING BREAST EXAMINATION: ICD-10-CM

## 2022-07-21 PROCEDURE — 99395 PREV VISIT EST AGE 18-39: CPT | Performed by: NURSE PRACTITIONER

## 2022-07-21 RX ORDER — ADAPALENE 3 MG/G
GEL TOPICAL
COMMUNITY
Start: 2022-05-24

## 2022-07-21 RX ORDER — DOXYCYCLINE 100 MG/1
100 CAPSULE ORAL 2 TIMES DAILY
COMMUNITY

## 2022-07-21 NOTE — PROGRESS NOTES
[facility]MA [facility]E OB/GYN  CNM Office Note    Dianna De La Paz is a 32 y.o. female who presents today for her medical conditions/ complaints as noted below. Chief Complaint   Patient presents with    Annual Exam         HPI  Pt presents today for pap smear and breast exam.  Denies any problems. Periods regular with normal duration and flow. Last mammogram:  3/2019  Last pap smear:  3/2021  Contraception:  aviane  :  2  Para:  2  AB:  0  Last bone density:  never  Last colonoscopy:   never    Patient Active Problem List   Diagnosis    Hypertension    PIH (pregnancy induced hypertension), third trimester    NST (non-stress test) reactive    Term pregnancy    Lump or mass in breast    Diffuse cystic mastopathy    Kidney stone    Right ureteral calculus    Renal colic on right side    Postoperative pain    27 weeks gestation of pregnancy     contractions     labor in third trimester without delivery    Normal labor    36 weeks gestation of pregnancy    Gestational hypertension, antepartum    Gestational diabetes mellitus (GDM) affecting second pregnancy     (normal spontaneous vaginal delivery)       Patient's last menstrual period was 2022.   [id number]    Past Medical History:   Diagnosis Date    Acne     ADHD     Asthma     Breast disorder     Augmentation    Hypertension 2018    Hypoglycemia     Mental disorder     Anxiety and Depression    PCOS (polycystic ovarian syndrome)      Past Surgical History:   Procedure Laterality Date    BREAST BIOPSY Left     BREAST ENHANCEMENT SURGERY      CYSTOSCOPY Right 2020    RIGHT URETEROSCOPY LASER LITHOTRIPSY performed by Glenn Stockton MD at [facility] 19 Right 2020    STENT PLACEMENT performed by Glenn Stockton MD at [place] [address] EXTRACTION       Family History   Problem Relation Age of Onset    Breast Cancer Maternal Grandmother [address] [facility]     Cancer Paternal Grandmother Female-spread to other organs     Brain Cancer Other         Maternal Great Uncle      Social History     Tobacco Use    Smoking status: Never    Smokeless tobacco: Never   Substance Use Topics    Alcohol use: No     Alcohol/week: 0.0 standard drinks       Current Outpatient Medications   Medication Sig Dispense Refill    Adapalene 0.3 % GEL APPLY PEASIZE AMOUNT TO FACE AT NIGHT FOR ACNE AS TOLERATED      doxycycline monohydrate (MONODOX) 100 MG capsule Take 100 mg by mouth in the morning and 100 mg before bedtime. levonorgestrel-ethinyl estradiol (AVIANE;ALESSE;LESSINA) 0.1-20 MG-MCG per tablet Take 1 tablet by mouth daily 1 packet 3    escitalopram (LEXAPRO) 10 MG tablet 20 mg      carvedilol (COREG) 6.25 MG tablet TAKE 1 TABLET BY MOUTH TWICE A DAY WITH FOOD FOR 90 DAYS      spironolactone (ALDACTONE) 100 MG tablet Take 1 tablet by mouth daily 30 tablet 0     No current facility-administered medications for this visit. Allergies   Allergen Reactions    Keflex [Cephalexin] Anaphylaxis    Penicillins Anaphylaxis and Hives    Penicillin G Hives     Vitals:    07/21/22 1444   BP: 128/83   Pulse: (!) 101     Body mass index is 38.79 kg/m². Review of Systems   Constitutional: Negative. HENT: Negative. Eyes: Negative. Respiratory: Negative. Cardiovascular: Negative. Gastrointestinal: Negative. Endocrine: Negative. Genitourinary: Negative. Negative for dyspareunia, dysuria, frequency, menstrual problem, pelvic pain, vaginal bleeding, vaginal discharge and vaginal pain. Skin: Negative. Allergic/Immunologic: Negative. Neurological: Negative. Hematological: Negative. Psychiatric/Behavioral: Negative. Physical Exam  Vitals and nursing note reviewed. Constitutional:       Appearance: She is well-developed. HENT:      Head: Normocephalic and atraumatic. Eyes:      Conjunctiva/sclera: Conjunctivae normal.      Pupils: Pupils are equal, round, and reactive to light. encounter. ORDERS:  Orders Placed This Encounter   Procedures    PAP SMEAR    Human papillomavirus (HPV) DNA probe thin prep high risk       PLAN:  WWE- pap collected SBE reviewed and CBE performed.    Contraceptive management- continuation of Aviane OCP

## 2022-07-21 NOTE — PATIENT INSTRUCTIONS
Patient Education        Well Visit, Ages 25 to 48: Care Instructions  Overview     Well visits can help you stay healthy. Your doctor has checked your overall health and may have suggested ways to take good care of yourself. Your doctor also may have recommended tests. At home, you can help prevent illness withhealthy eating, regular exercise, and other steps. Follow-up care is a key part of your treatment and safety. Be sure to make and go to all appointments, and call your doctor if you are having problems. It's also a good idea to know your test results and keep alist of the medicines you take. How can you care for yourself at home? Get screening tests that you and your doctor decide on. Screening helps find diseases before any symptoms appear. Eat healthy foods. Choose fruits, vegetables, whole grains, protein, and low-fat dairy foods. Limit fat, especially saturated fat. Reduce salt in your diet. Limit alcohol. If you are a man, have no more than 2 drinks a day or 14 drinks a week. If you are a woman, have no more than 1 drink a day or 7 drinks a week. Get at least 30 minutes of physical activity on most days of the week. Walking is a good choice. You also may want to do other activities, such as running, swimming, cycling, or playing tennis or team sports. Discuss any changes in your exercise program with your doctor. Reach and stay at a healthy weight. This will lower your risk for many problems, such as obesity, diabetes, heart disease, and high blood pressure. Do not smoke or allow others to smoke around you. If you need help quitting, talk to your doctor about stop-smoking programs and medicines. These can increase your chances of quitting for good. Care for your mental health. It is easy to get weighed down by worry and stress. Learn strategies to manage stress, like deep breathing and mindfulness, and stay connected with your family and community.  If you find you often feel sad or hopeless, talk with your doctor. Treatment can help. Talk to your doctor about whether you have any risk factors for sexually transmitted infections (STIs). You can help prevent STIs if you wait to have sex with a new partner (or partners) until you've each been tested for STIs. It also helps if you use condoms (male or female condoms) and if you limit your sex partners to one person who only has sex with you. Vaccines are available for some STIs, such as HPV. Use birth control if it's important to you to prevent pregnancy. Talk with your doctor about the choices available and what might be best for you. If you think you may have a problem with alcohol or drug use, talk to your doctor. This includes prescription medicines (such as amphetamines and opioids) and illegal drugs (such as cocaine and methamphetamine). Your doctor can help you figure out what type of treatment is best for you. Protect your skin from too much sun. When you're outdoors from 10 a.m. to 4 p.m., stay in the shade or cover up with clothing and a hat with a wide brim. Wear sunglasses that block UV rays. Even when it's cloudy, put broad-spectrum sunscreen (SPF 30 or higher) on any exposed skin. See a dentist one or two times a year for checkups and to have your teeth cleaned. Wear a seat belt in the car. When should you call for help? Watch closely for changes in your health, and be sure to contact your doctor if you have any problems or symptoms that concern you. Where can you learn more? Go to https://carmencita.healthbMenu. org and sign in to your [employer] account. Enter P072 in the [employer] box to learn more about \"Well Visit, Ages 25 to 48: Care Instructions. \"     If you do not have an account, please click on the \"Sign Up Now\" link. Current as of: October 6, 2021               Content Version: 13.3  © 9509-5699 Healthwise, Incorporated. Care instructions adapted under license by [facility] ([facility]).  If you have questions about a medical condition or this instruction, always ask your healthcare professional. [address] any warranty or liability for your use of this information. Patient Education        Breast Self-Exam: Care Instructions  Your Care Instructions     A breast self-exam is when you check your breasts for lumps or changes. This regular exam helps you learn how your breasts normally look and feel. Mostbreast problems or changes are not because of cancer. Breast self-exam is not a substitute for a mammogram. Having regular breast exams by your doctor and regular mammograms improve your chances of finding anyproblems with your breasts. Some women set a time each month to do a step-by-step breast self-exam. Other women like a less formal system. They might look at their breasts as they brushtheir teeth, or feel their breasts once in a while in the shower. If you notice a change in your breast, tell your doctor. Follow-up care is a key part of your treatment and safety. Be sure to make and go to all appointments, and call your doctor if you are having problems. It's also a good idea to know your test results and keep alist of the medicines you take. How do you do a breast self-exam?  The best time to examine your breasts is usually one week after your menstrual period begins. Your breasts should not be tender then. If you do not have periods, you might do your exam on a day of the month that is easy to remember. To examine your breasts:  Remove all your clothes above the waist and lie down. When you are lying down, your breast tissue spreads evenly over your chest wall, which makes it easier to feel all your breast tissue. Use the pads--not the fingertips--of the 3 middle fingers of your left hand to check your right breast. Move your fingers slowly in small coin-sized circles that overlap. Use three levels of pressure to feel of all your breast tissue.  Use light pressure to feel the tissue close to the skin surface. Use medium pressure to feel a little deeper. Use firm pressure to feel your tissue close to your breastbone and ribs. Use each pressure level to feel your breast tissue before moving on to the next spot. Check your entire breast, moving up and down as if following a strip from the collarbone to the bra line, and from the armpit to the ribs. Repeat until you have covered the entire breast.  Repeat this procedure for your left breast, using the pads of the 3 middle fingers of your right hand. To examine your breasts while in the shower:  Place one arm over your head and lightly soap your breast on that side. Using the pads of your fingers, gently move your hand over your breast (in the strip pattern described above), feeling carefully for any lumps or changes. Repeat for the other breast.  Have your doctor inspect anything you notice to see if you need further testing. Where can you learn more? Go to https://AmberPoint.Kast. org and sign in to your [employer] account. Enter P148 in the [employer] box to learn more about \"Breast Self-Exam: Care Instructions. \"     If you do not have an account, please click on the \"Sign Up Now\" link. Current as of: September 8, 2021               Content Version: 13.3  © 2006-2022 Healthwise, Incorporated. Care instructions adapted under license by [facility] ([facility]). If you have questions about a medical condition or this instruction, always ask your healthcare professional. [place] [address] any warranty or liability for your use of this information.

## 2022-08-09 RX ORDER — LEVONORGESTREL AND ETHINYL ESTRADIOL 0.1-0.02MG
1 KIT ORAL DAILY
Qty: 1 PACKET | Refills: 11 | Status: SHIPPED | OUTPATIENT
Start: 2022-08-09

## 2022-08-14 ASSESSMENT — ENCOUNTER SYMPTOMS
EYES NEGATIVE: 1
ALLERGIC/IMMUNOLOGIC NEGATIVE: 1
GASTROINTESTINAL NEGATIVE: 1
RESPIRATORY NEGATIVE: 1

## 2022-09-01 DIAGNOSIS — Z86.32 HISTORY OF GESTATIONAL DIABETES: Primary | ICD-10-CM

## 2022-09-30 ENCOUNTER — HOSPITAL ENCOUNTER (OUTPATIENT)
Dept: GENERAL RADIOLOGY | Facility: HOSPITAL | Age: 32
Discharge: HOME | End: 2022-09-30
Admitting: INTERNAL MEDICINE

## 2022-09-30 ENCOUNTER — TRANSCRIBE ORDERS (OUTPATIENT)
Dept: ADMINISTRATIVE | Facility: HOSPITAL | Age: 32
End: 2022-09-30

## 2022-09-30 DIAGNOSIS — R31.9 HEMATURIA, UNSPECIFIED TYPE: ICD-10-CM

## 2022-09-30 DIAGNOSIS — R31.9 HEMATURIA, UNSPECIFIED TYPE: Primary | ICD-10-CM

## 2022-09-30 PROCEDURE — 74018 RADEX ABDOMEN 1 VIEW: CPT

## 2022-12-22 ENCOUNTER — TELEPHONE (OUTPATIENT)
Dept: UROLOGY | Facility: CLINIC | Age: 32
End: 2022-12-22

## 2022-12-22 DIAGNOSIS — N20.0 KIDNEY STONE: Primary | ICD-10-CM

## 2022-12-22 NOTE — TELEPHONE ENCOUNTER
Caller: Brittnee Mccloud    Relationship: Self    Best call back number: [phone]    What orders are you requesting (i.e. lab or imaging): KUB ORDERS    In what timeframe would the patient need to come in: PRIOR TO APPT ON 01/12/23     Where will you receive your lab/imaging services: [facility]

## 2022-12-23 ENCOUNTER — HOSPITAL ENCOUNTER (OUTPATIENT)
Dept: GENERAL RADIOLOGY | Facility: HOSPITAL | Age: 32
Discharge: HOME | End: 2022-12-23

## 2022-12-23 DIAGNOSIS — N20.0 KIDNEY STONE: ICD-10-CM

## 2022-12-23 PROCEDURE — 87086 URINE CULTURE/COLONY COUNT: CPT

## 2022-12-23 PROCEDURE — 87801 DETECT AGNT MULT DNA AMPLI: CPT

## 2022-12-23 PROCEDURE — 74018 RADEX ABDOMEN 1 VIEW: CPT

## 2022-12-23 PROCEDURE — 87591 N.GONORRHOEAE DNA AMP PROB: CPT

## 2022-12-23 PROCEDURE — 87798 DETECT AGENT NOS DNA AMP: CPT

## 2022-12-23 PROCEDURE — 87661 TRICHOMONAS VAGINALIS AMPLIF: CPT

## 2022-12-23 PROCEDURE — 87491 CHLMYD TRACH DNA AMP PROBE: CPT

## 2023-01-11 ENCOUNTER — TELEPHONE (OUTPATIENT)
Dept: UROLOGY | Facility: CLINIC | Age: 33
End: 2023-01-11
Payer: COMMERCIAL

## 2023-01-11 NOTE — TELEPHONE ENCOUNTER
Called Patient to remind them to get KUB 1 hour prior to appointment.  Left vm with Patient.  If patient calls back it is ok for the HUB to tell the pt the message.

## 2023-01-12 ENCOUNTER — TELEPHONE (OUTPATIENT)
Dept: UROLOGY | Facility: CLINIC | Age: 33
End: 2023-01-12
Payer: COMMERCIAL

## 2023-01-12 NOTE — TELEPHONE ENCOUNTER
LVM FOR PT TO C/B TO RESCHEDULE APPT SINCE THEY WERE A NO SHOW ON 1/12/23. OK TO RESCHEDULE PER HUB IF PT C/B

## 2023-07-14 DIAGNOSIS — N20.0 KIDNEY STONE: Primary | ICD-10-CM

## 2023-07-23 ENCOUNTER — HOSPITAL ENCOUNTER (EMERGENCY)
Age: 33
Discharge: HOME | End: 2023-07-23
Payer: COMMERCIAL

## 2023-07-23 ENCOUNTER — APPOINTMENT (OUTPATIENT)
Dept: CT IMAGING | Age: 33
End: 2023-07-23
Payer: COMMERCIAL

## 2023-07-23 VITALS
SYSTOLIC BLOOD PRESSURE: 115 MMHG | BODY MASS INDEX: 35.43 KG/M2 | WEIGHT: 200 LBS | DIASTOLIC BLOOD PRESSURE: 80 MMHG | OXYGEN SATURATION: 100 % | RESPIRATION RATE: 18 BRPM | HEART RATE: 94 BPM | TEMPERATURE: 98.6 F

## 2023-07-23 DIAGNOSIS — R10.30 LOWER ABDOMINAL PAIN: Primary | ICD-10-CM

## 2023-07-23 DIAGNOSIS — R30.0 DYSURIA: ICD-10-CM

## 2023-07-23 LAB
ALBUMIN SERPL-MCNC: 4.4 G/DL (ref 3.5–5.2)
ALP SERPL-CCNC: 75 U/L (ref 35–104)
ALT SERPL-CCNC: 8 U/L (ref 5–33)
ANION GAP SERPL CALCULATED.3IONS-SCNC: 13 MMOL/L (ref 7–19)
AST SERPL-CCNC: 12 U/L (ref 5–32)
BASOPHILS # BLD: 0.1 K/UL (ref 0–0.2)
BASOPHILS NFR BLD: 0.5 % (ref 0–1)
BILIRUB SERPL-MCNC: <0.2 MG/DL (ref 0.2–1.2)
BILIRUB UR QL STRIP: NEGATIVE
BUN SERPL-MCNC: 14 MG/DL (ref 6–20)
CALCIUM SERPL-MCNC: 9.2 MG/DL (ref 8.6–10)
CHLORIDE SERPL-SCNC: 102 MMOL/L (ref 98–111)
CLARITY UR: CLEAR
CO2 SERPL-SCNC: 20 MMOL/L (ref 22–29)
COLOR UR: YELLOW
CREAT SERPL-MCNC: 0.8 MG/DL (ref 0.5–0.9)
EOSINOPHIL # BLD: 0.1 K/UL (ref 0–0.6)
EOSINOPHIL NFR BLD: 0.8 % (ref 0–5)
ERYTHROCYTE [DISTWIDTH] IN BLOOD BY AUTOMATED COUNT: 12.5 % (ref 11.5–14.5)
GLUCOSE SERPL-MCNC: 113 MG/DL (ref 74–109)
GLUCOSE UR STRIP.AUTO-MCNC: NEGATIVE MG/DL
HCG SERPL QL: NEGATIVE
HCT VFR BLD AUTO: 40.7 % (ref 37–47)
HGB BLD-MCNC: 13.8 G/DL (ref 12–16)
HGB UR STRIP.AUTO-MCNC: NEGATIVE MG/L
IMM GRANULOCYTES # BLD: 0 K/UL
KETONES UR STRIP.AUTO-MCNC: NEGATIVE MG/DL
LEUKOCYTE ESTERASE UR QL STRIP.AUTO: NEGATIVE
LIPASE SERPL-CCNC: 34 U/L (ref 13–60)
LYMPHOCYTES # BLD: 1.8 K/UL (ref 1.1–4.5)
LYMPHOCYTES NFR BLD: 19 % (ref 20–40)
MCH RBC QN AUTO: 28.6 PG (ref 27–31)
MCHC RBC AUTO-ENTMCNC: 33.9 G/DL (ref 33–37)
MCV RBC AUTO: 84.4 FL (ref 81–99)
MONOCYTES # BLD: 0.5 K/UL (ref 0–0.9)
MONOCYTES NFR BLD: 5.3 % (ref 0–10)
NEUTROPHILS # BLD: 6.8 K/UL (ref 1.5–7.5)
NEUTS SEG NFR BLD: 74.1 % (ref 50–65)
NITRITE UR QL STRIP.AUTO: NEGATIVE
PH UR STRIP.AUTO: 6.5 [PH] (ref 5–8)
PLATELET # BLD AUTO: 337 K/UL (ref 130–400)
PMV BLD AUTO: 8.8 FL (ref 9.4–12.3)
POTASSIUM SERPL-SCNC: 4 MMOL/L (ref 3.5–5)
PROT SERPL-MCNC: 7.9 G/DL (ref 6.6–8.7)
PROT UR STRIP.AUTO-MCNC: NEGATIVE MG/DL
RBC # BLD AUTO: 4.82 M/UL (ref 4.2–5.4)
SODIUM SERPL-SCNC: 135 MMOL/L (ref 136–145)
SP GR UR STRIP.AUTO: 1.01 (ref 1–1.03)
UROBILINOGEN UR STRIP.AUTO-MCNC: 0.2 E.U./DL
WBC # BLD AUTO: 9.2 K/UL (ref 4.8–10.8)

## 2023-07-23 PROCEDURE — 85025 COMPLETE CBC W/AUTO DIFF WBC: CPT

## 2023-07-23 PROCEDURE — 80053 COMPREHEN METABOLIC PANEL: CPT

## 2023-07-23 PROCEDURE — 83690 ASSAY OF LIPASE: CPT

## 2023-07-23 PROCEDURE — 74177 CT ABD & PELVIS W/CONTRAST: CPT

## 2023-07-23 PROCEDURE — 81003 URINALYSIS AUTO W/O SCOPE: CPT

## 2023-07-23 PROCEDURE — 6360000004 HC RX CONTRAST MEDICATION: Performed by: NURSE PRACTITIONER

## 2023-07-23 PROCEDURE — 36415 COLL VENOUS BLD VENIPUNCTURE: CPT

## 2023-07-23 PROCEDURE — 99285 EMERGENCY DEPT VISIT HI MDM: CPT

## 2023-07-23 PROCEDURE — 84703 CHORIONIC GONADOTROPIN ASSAY: CPT

## 2023-07-23 RX ORDER — BUSPIRONE HYDROCHLORIDE 10 MG/1
10 TABLET ORAL 3 TIMES DAILY
COMMUNITY

## 2023-07-23 RX ADMIN — IOPAMIDOL 75 ML: 755 INJECTION, SOLUTION INTRAVENOUS at 12:47

## 2023-07-23 ASSESSMENT — ENCOUNTER SYMPTOMS: ABDOMINAL PAIN: 1

## 2023-07-23 NOTE — ED PROVIDER NOTES
[facility] EMERGENCY DEPT  eMERGENCY dEPARTMENT eNCOUnter      Pt Name: Montrell Plata  MRN: [id number]  [facility] 1990  Date of evaluation: 7/23/2023  Provider: Dayron Emanuel       Chief Complaint   Patient presents with    Back Pain     X2 weeks      Abdominal Pain         HISTORY OF PRESENT ILLNESS   (Location/Symptom, Timing/Onset,Context/Setting, Quality, Duration, Modifying Factors, Severity)  Note limiting factors. Mnotrell Plata is a 28 y.o. female who presents to the emergency department low abd pain and dysuria x 2 weeks. Also back pain. Pt denies constipation or diarrhea. Tells me she has had interstitial cystitis before. No vaginal discharge. No fever     The history is provided by the patient. Abdominal Pain  Pain location:  LLQ and RLQ  Pain quality: heavy and pressure    Pain radiates to:  Back  Pain severity:  Moderate  Onset quality:  Sudden  Duration:  2 weeks  Timing:  Constant  Progression:  Unchanged  Chronicity:  New    NursingNotes were reviewed. REVIEW OF SYSTEMS    (2-9 systems for level 4, 10 or more for level 5)     Review of Systems   Gastrointestinal:  Positive for abdominal pain. Except as noted above the remainder of the review of systems was reviewed and negative.        PAST MEDICAL HISTORY     Past Medical History:   Diagnosis Date    Acne     ADHD     Asthma     Breast disorder     Augmentation    Hypertension 7/27/2018    Hypoglycemia     Mental disorder     Anxiety and Depression    PCOS (polycystic ovarian syndrome)          SURGICALHISTORY       Past Surgical History:   Procedure Laterality Date    BREAST BIOPSY Left     BREAST ENHANCEMENT SURGERY      CYSTOSCOPY Right 4/8/2020    RIGHT URETEROSCOPY LASER LITHOTRIPSY performed by Naima Almanza MD at [address] Right 4/8/2020    STENT PLACEMENT performed by Naima Almanza MD at [address] [facility]       Discharge Medication

## 2023-08-24 RX ORDER — TIMOLOL MALEATE 5 MG/ML
SOLUTION/ DROPS OPHTHALMIC
Qty: 28 TABLET | Refills: 11 | Status: SHIPPED | OUTPATIENT
Start: 2023-08-24

## 2023-09-07 ENCOUNTER — OFFICE VISIT (OUTPATIENT)
Dept: OBGYN CLINIC | Age: 33
End: 2023-09-07
Payer: COMMERCIAL

## 2023-09-07 VITALS
WEIGHT: 202 LBS | HEART RATE: 93 BPM | BODY MASS INDEX: 35.78 KG/M2 | SYSTOLIC BLOOD PRESSURE: 115 MMHG | DIASTOLIC BLOOD PRESSURE: 81 MMHG

## 2023-09-07 DIAGNOSIS — Z30.41 ENCOUNTER FOR SURVEILLANCE OF CONTRACEPTIVE PILLS: ICD-10-CM

## 2023-09-07 DIAGNOSIS — Z12.39 ENCOUNTER FOR SCREENING BREAST EXAMINATION: ICD-10-CM

## 2023-09-07 DIAGNOSIS — R61 SWEATING INCREASE: ICD-10-CM

## 2023-09-07 DIAGNOSIS — Z12.4 SCREENING FOR CERVICAL CANCER: ICD-10-CM

## 2023-09-07 DIAGNOSIS — Z01.419 WELL WOMAN EXAM WITH ROUTINE GYNECOLOGICAL EXAM: Primary | ICD-10-CM

## 2023-09-07 PROCEDURE — 3078F DIAST BP <80 MM HG: CPT | Performed by: NURSE PRACTITIONER

## 2023-09-07 PROCEDURE — 99395 PREV VISIT EST AGE 18-39: CPT | Performed by: NURSE PRACTITIONER

## 2023-09-07 PROCEDURE — 3074F SYST BP LT 130 MM HG: CPT | Performed by: NURSE PRACTITIONER

## 2023-09-07 RX ORDER — SEMAGLUTIDE 1.7 MG/.75ML
1.7 INJECTION, SOLUTION SUBCUTANEOUS
COMMUNITY

## 2023-09-07 ASSESSMENT — ENCOUNTER SYMPTOMS
RESPIRATORY NEGATIVE: 1
GASTROINTESTINAL NEGATIVE: 1
EYES NEGATIVE: 1
ALLERGIC/IMMUNOLOGIC NEGATIVE: 1

## 2023-09-12 LAB
HPV HR 12 DNA SPEC QL NAA+PROBE: NOT DETECTED
HPV16 DNA SPEC QL NAA+PROBE: NOT DETECTED
HPV16+18+H RISK 12 DNA SPEC-IMP: NORMAL
HPV18 DNA SPEC QL NAA+PROBE: NOT DETECTED

## 2024-09-09 ENCOUNTER — OFFICE VISIT (OUTPATIENT)
Dept: OBGYN CLINIC | Age: 34
End: 2024-09-09
Payer: COMMERCIAL

## 2024-09-09 VITALS
BODY MASS INDEX: 34.9 KG/M2 | HEART RATE: 96 BPM | SYSTOLIC BLOOD PRESSURE: 123 MMHG | DIASTOLIC BLOOD PRESSURE: 79 MMHG | WEIGHT: 197 LBS

## 2024-09-09 DIAGNOSIS — Z12.39 ENCOUNTER FOR SCREENING BREAST EXAMINATION: ICD-10-CM

## 2024-09-09 DIAGNOSIS — Z98.82 H/O BILATERAL BREAST IMPLANTS: ICD-10-CM

## 2024-09-09 DIAGNOSIS — Z01.419 WELL WOMAN EXAM WITH ROUTINE GYNECOLOGICAL EXAM: Primary | ICD-10-CM

## 2024-09-09 DIAGNOSIS — Z12.4 SCREENING FOR CERVICAL CANCER: ICD-10-CM

## 2024-09-09 DIAGNOSIS — Z30.41 ENCOUNTER FOR SURVEILLANCE OF CONTRACEPTIVE PILLS: ICD-10-CM

## 2024-09-09 PROCEDURE — 3074F SYST BP LT 130 MM HG: CPT | Performed by: NURSE PRACTITIONER

## 2024-09-09 PROCEDURE — 3078F DIAST BP <80 MM HG: CPT | Performed by: NURSE PRACTITIONER

## 2024-09-09 PROCEDURE — 99395 PREV VISIT EST AGE 18-39: CPT | Performed by: NURSE PRACTITIONER

## 2024-09-09 RX ORDER — DOXYCYCLINE 100 MG/1
CAPSULE ORAL
COMMUNITY
Start: 2024-08-31

## 2024-09-09 RX ORDER — LEVONORGESTREL/ETHIN.ESTRADIOL 0.1-0.02MG
1 TABLET ORAL EVERY MORNING
Qty: 84 TABLET | Refills: 3 | Status: SHIPPED | OUTPATIENT
Start: 2024-09-09

## 2024-10-09 NOTE — H&P
[facility] EMMA KAYE    [facility] History and Physical    Provider: JOE Rivera CNM  Date: 3/10/2022            5:47 PM    Patient Name: Florentino Wallis  Patient : 1990  MRN: [id number]   Room[facility]Bed: [facility]9/0219-01    SUBJECTIVE:    CHIEF COMPLAINT:  contractions  No chief complaint on file. HISTORY OF PRESENT ILLNESS:      Florentino Wallis a 32 y.o. female at 36w7d presents with a chief complaint as above and is being admitted for active phase labor. Pregnancy has been complicated by GHTN, recurrent UTI's, Elevated MCA, and GDM    Contractions: 7-10min  Rupture of membranes: intact  Vaginal bleeding: none    REVIEW OF SYSTEMS:  A comprehensive review of systems was negative except for what was noted in the HPI. OBJECTIVE:     Estimated Due Date:   Estimated Date of Delivery: 22   Patient's last menstrual period was 2021 (exact date).       PREGNANCY RISK FACTORS:       Patient Active Problem List   Diagnosis    Hypertension    PIH (pregnancy induced hypertension), third trimester    NST (non-stress test) reactive    Term pregnancy    Lump or mass in breast    Diffuse cystic mastopathy    Kidney stone    Right ureteral calculus    Renal colic on right side    Postoperative pain    27 weeks gestation of pregnancy     contractions     labor in third trimester without delivery    Normal labor        Steroids:  yes    PAST OB HISTORY:  OB History    Para Term  AB Living   2 1 1 0 0 1   SAB IAB Ectopic Molar Multiple Live Births   0 0 0 0 0 1      # Outcome Date GA Lbr Nima/2nd Weight Sex Delivery Anes PTL Lv   2 Current            1 Term 18 39w0d / 02:48 6 lb 11.2 oz (3.04 kg) M Vag-Vacuum EPI N MINA      Name: Mukesh Cintron: 5  Apgar5: 5         Depression:  No      Post-partum depression:  No      Diabetes:  No      Gestational Diabetes:  Yes insulin controlled      Thyroid Disease:  No      Chronic HTN:  No Days of Exercise per Week: Not on file    Minutes of Exercise per Session: Not on file   Stress:     Feeling of Stress : Not on file   Social Connections:     Frequency of Communication with Friends and Family: Not on file    Frequency of Social Gatherings with Friends and Family: Not on file    Attends Congregation Services: Not on file    Active Member of [address] [place] [address] Yelp or Organizations: Not on file    Attends Club or Organization Meetings: Not on file    Marital Status: Not on file   Intimate Partner Violence:     Fear of Current or Ex-Partner: Not on file    Emotionally Abused: Not on file    Physically Abused: Not on file    Sexually Abused: Not on file   Housing Stability:     Unable to Pay for Housing in the Last Year: Not on file    Number of [place] in the Last Year: Not on file    Unstable Housing in the Last Year: Not on file       Family History:       Problem Relation Age of Onset    Breast Cancer Maternal Grandmother [address] [facility] Paternal Grandmother         Female-spread to other organs     Brain Cancer Other         Maternal Great Uncle        Medications Prior to Admission:  Medications Prior to Admission: LEVEMIR FLEXTOUCH 100 UNIT/ML injection pen,   FREESTYLE LITE strip,   cetirizine (ZYRTEC) 5 MG tablet, Take 5 mg by mouth daily  fluticasone (FLONASE) 50 MCG/ACT nasal spray, 1 spray by Each Nostril route daily  PROBIOTIC, LACTOBACILLUS, PO, Take by mouth  ferrous sulfate (RACIEL-KATERINA) 325 (65 Fe) MG tablet, Take 1 tablet by mouth daily (with breakfast)  Choline Dihydrogen Citrate (CHOLINE CITRATE PO), Take by mouth  Docusate Sodium (COLACE PO), Take by mouth  Prenatal MV-Min-Fe Fum-FA-DHA (PRENATAL 1 PO), Take by mouth  B-D ULTRAFINE III SHORT PEN 31G X 8 MM MISC,   SSD 1 % cream,   Blood Glucose Monitoring Suppl (FREESTYLE LITE) w/Device KIT,   FreeStyle Lancets MISC,   progesterone (PROMETRIUM) 200 MG CAPS capsule, Take 1 capsule by mouth nightly    PHYSICAL EXAM:     Vitals:    03/10/22 1400   BP: 132/82   Pulse: 95       General appearance:  awake, alert, cooperative, no apparent distress, and appears stated age  Skin:  Warm, dry, no rashes or erythema  Neurologic:  Awake, alert, oriented to name, place and time. Cranial nerves II-XII are grossly intact. Motor is 5 out of 5 bilaterally.   Lungs:  No increased work of breathing, good air exchange, clear to auscultation bilaterally, no crackles or wheezing  Heart:  Normal apical impulse, regular rate and rhythm, normal S1 and S2, no S3 or S4, and no murmur noted  Breast:  Deferred   Abdomen: Gravid, Non-Tender  Extremities:  no calf tenderness, non edematous, DTRs: normal    Pelvic Exam:  FETALPRESENTATION:Cephalic  DILATION:  6 cm  EFFACEMENT:   80  STATION:  -2 cm  CONSISTENCY:  soft  POSITION:  mid    Milner Score      0        1         2         3        Patient  Dilation                clsd     1-2      3-4      5-6      3  Effacement           0-30   40-50  60-70  >80     3  Station                  -3        -2       -1/0     +1/+2  1  Consistency         Firm    Med     Soft               2  Position                Post    Mid      Ant                1                                                    Milner Score: 10    MEMBRANES:  Intact                                FETAL HEART RATE:    Baseline: 140's      Variability: moderate      Accelerations: present      Decelerations: variable      FHR: Category: II          CONTRACTIONS:   Frequency: 4-9min  Duration:   Intensity: mod-firm  Resting tone: soft     Lab Results:   Blood Type/Rh:    ABO/Rh   Date Value Ref Range Status   03/10/2022 O POS  Final     Antibody Screen:    Antibody Screen   Date Value Ref Range Status   03/10/2022 NEG  Final     Hemoglobin:   Hemoglobin   Date Value Ref Range Status   03/10/2022 12.0 12.0 - 16.0 g/dL Final     Hematocrit:   Hematocrit   Date Value Ref Range Status   03/10/2022 36.7 (L) 37.0 - 47.0 % Final Platelet Count:   Platelets   Date Value Ref Range Status   03/10/2022 163 130 - 400 K/uL Final     Hepatitis B Surface Antigen: NR  Group B Strep:  Neg  RPR: NR      ASSESSMENT/PLAN:  Rayshawn Feliciano is a 32 y.o. female   At 36w7d    Other:   1. Admit to LDR with routine order set  2. Labor support prn  3. Pain management and epidural at pt request  4. GBS prophylaxis per policy  5. Anticipate vaginal delivery      Risks, benefits, alternatives and possible complications have been discussed in detail with the patient. Admission, and post admission procedures and expectations were discussed in detail. All questions were answered.     JOE Guerrero CNM home

## 2025-01-04 NOTE — ED NOTES
Please take a decongestant such as Flonase or Afrin OTC  Taking a daily antihistamine (Zyrtec,Allegra work best) may help with symptoms  Please gargle with salt water as needed for sore throat. Please increase fluid intake.         Pt may go home @3     Cyrus Santos RN  03/14/22 [id number]

## 2025-06-06 ENCOUNTER — APPOINTMENT (OUTPATIENT)
Dept: CT IMAGING | Age: 35
End: 2025-06-06
Payer: COMMERCIAL

## 2025-06-06 ENCOUNTER — HOSPITAL ENCOUNTER (EMERGENCY)
Age: 35
Discharge: HOME | End: 2025-06-06
Payer: COMMERCIAL

## 2025-06-06 VITALS
HEART RATE: 79 BPM | OXYGEN SATURATION: 100 % | WEIGHT: 215 LBS | BODY MASS INDEX: 38.09 KG/M2 | RESPIRATION RATE: 16 BRPM | HEIGHT: 63 IN | SYSTOLIC BLOOD PRESSURE: 118 MMHG | TEMPERATURE: 97.8 F | DIASTOLIC BLOOD PRESSURE: 71 MMHG

## 2025-06-06 DIAGNOSIS — S16.1XXA STRAIN OF NECK MUSCLE, INITIAL ENCOUNTER: ICD-10-CM
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
## 2025-06-06 LAB
ALBUMIN SERPL-MCNC: 4.5 G/DL (ref 3.5–5.2)
ALP SERPL-CCNC: 69 U/L (ref 35–104)
ALT SERPL-CCNC: 78 U/L (ref 10–35)
ANION GAP SERPL CALCULATED.3IONS-SCNC: 13 MMOL/L (ref 8–16)
AST SERPL-CCNC: 53 U/L (ref 10–35)
BASOPHILS # BLD: 0 K/UL (ref 0–0.2)
BASOPHILS NFR BLD: 0.4 % (ref 0–1)
BILIRUB SERPL-MCNC: 0.3 MG/DL (ref 0.2–1.2)
BUN SERPL-MCNC: 16 MG/DL (ref 6–20)
CALCIUM SERPL-MCNC: 9.9 MG/DL (ref 8.6–10)
CHLORIDE SERPL-SCNC: 102 MMOL/L (ref 98–107)
CO2 SERPL-SCNC: 21 MMOL/L (ref 22–29)
CREAT SERPL-MCNC: 0.8 MG/DL (ref 0.5–0.9)
EOSINOPHIL # BLD: 0.1 K/UL (ref 0–0.6)
EOSINOPHIL NFR BLD: 0.8 % (ref 0–5)
ERYTHROCYTE [DISTWIDTH] IN BLOOD BY AUTOMATED COUNT: 12.2 % (ref 11.5–14.5)
GLUCOSE SERPL-MCNC: 94 MG/DL (ref 70–99)
HCG SERPL QL: NEGATIVE
HCT VFR BLD AUTO: 45.4 % (ref 37–47)
HGB BLD-MCNC: 15.3 G/DL (ref 12–16)
IMM GRANULOCYTES # BLD: 0 K/UL
LIPASE SERPL-CCNC: 28 U/L (ref 13–60)
LYMPHOCYTES # BLD: 2.2 K/UL (ref 1.1–4.5)
LYMPHOCYTES NFR BLD: 23.7 % (ref 20–40)
MCH RBC QN AUTO: 29 PG (ref 27–31)
MCHC RBC AUTO-ENTMCNC: 33.7 G/DL (ref 33–37)
MCV RBC AUTO: 86 FL (ref 81–99)
MONOCYTES # BLD: 0.5 K/UL (ref 0–0.9)
MONOCYTES NFR BLD: 5.8 % (ref 0–10)
NEUTROPHILS # BLD: 6.4 K/UL (ref 1.5–7.5)
NEUTS SEG NFR BLD: 69 % (ref 50–65)
PLATELET # BLD AUTO: 305 K/UL (ref 130–400)
PMV BLD AUTO: 9.1 FL (ref 9.4–12.3)
POTASSIUM SERPL-SCNC: 4.3 MMOL/L (ref 3.5–5.1)
PROT SERPL-MCNC: 7.9 G/DL (ref 6.4–8.3)
RBC # BLD AUTO: 5.28 M/UL (ref 4.2–5.4)
SODIUM SERPL-SCNC: 136 MMOL/L (ref 136–145)
WBC # BLD AUTO: 9.2 K/UL (ref 4.8–10.8)

## 2025-06-06 PROCEDURE — 96374 THER/PROPH/DIAG INJ IV PUSH: CPT

## 2025-06-06 PROCEDURE — 72125 CT NECK SPINE W/O DYE: CPT

## 2025-06-06 PROCEDURE — 71260 CT THORAX DX C+: CPT

## 2025-06-06 PROCEDURE — 84703 CHORIONIC GONADOTROPIN ASSAY: CPT

## 2025-06-06 PROCEDURE — 70450 CT HEAD/BRAIN W/O DYE: CPT

## 2025-06-06 PROCEDURE — 6360000002 HC RX W HCPCS: Performed by: NURSE PRACTITIONER

## 2025-06-06 PROCEDURE — 83690 ASSAY OF LIPASE: CPT

## 2025-06-06 PROCEDURE — 80053 COMPREHEN METABOLIC PANEL: CPT

## 2025-06-06 PROCEDURE — 6360000004 HC RX CONTRAST MEDICATION: Performed by: NURSE PRACTITIONER

## 2025-06-06 PROCEDURE — 6370000000 HC RX 637 (ALT 250 FOR IP): Performed by: NURSE PRACTITIONER

## 2025-06-06 PROCEDURE — 74177 CT ABD & PELVIS W/CONTRAST: CPT

## 2025-06-06 PROCEDURE — 36415 COLL VENOUS BLD VENIPUNCTURE: CPT

## 2025-06-06 PROCEDURE — 99285 EMERGENCY DEPT VISIT HI MDM: CPT

## 2025-06-06 PROCEDURE — 85025 COMPLETE CBC W/AUTO DIFF WBC: CPT

## 2025-06-06 RX ORDER — ACETAMINOPHEN 325 MG/1
650 TABLET ORAL ONCE
Status: COMPLETED | OUTPATIENT
Start: 2025-06-06 | End: 2025-06-06

## 2025-06-06 RX ORDER — ONDANSETRON 2 MG/ML
4 INJECTION INTRAMUSCULAR; INTRAVENOUS ONCE
Status: COMPLETED | OUTPATIENT
Start: 2025-06-06 | End: 2025-06-06

## 2025-06-06 RX ORDER — IOPAMIDOL 755 MG/ML
80 INJECTION, SOLUTION INTRAVASCULAR
Status: COMPLETED | OUTPATIENT
Start: 2025-06-06 | End: 2025-06-06

## 2025-06-06 RX ADMIN — IOPAMIDOL 80 ML: 755 INJECTION, SOLUTION INTRAVENOUS at 15:40

## 2025-06-06 RX ADMIN — ONDANSETRON 4 MG: 2 INJECTION, SOLUTION INTRAMUSCULAR; INTRAVENOUS at 16:26

## 2025-06-06 RX ADMIN — ACETAMINOPHEN 650 MG: 325 TABLET ORAL at 16:56

## 2025-06-06 ASSESSMENT — ENCOUNTER SYMPTOMS
BACK PAIN: 0
VOMITING: 0
COUGH: 0
NAUSEA: 0
ABDOMINAL PAIN: 0
DIARRHEA: 0
SHORTNESS OF BREATH: 0
PHOTOPHOBIA: 0

## 2025-06-06 ASSESSMENT — PAIN DESCRIPTION - LOCATION: LOCATION: HEAD

## 2025-06-06 ASSESSMENT — PAIN SCALES - GENERAL: PAINLEVEL_OUTOF10: 7

## 2025-06-06 ASSESSMENT — PAIN DESCRIPTION - DESCRIPTORS: DESCRIPTORS: ACHING;STABBING
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
## (undated) DEVICE — SEAL ENDO INSTR SELF SEAL UROLOGY

## (undated) DEVICE — Z INACTIVE USE 2660664 SOLUTION IRRIG 3000ML 0.9% SOD CHL USP UROMATIC PLAS CONT

## (undated) DEVICE — GUIDEWIRE ENDOSCP L150CM DIA0.035IN TIP 3CM PTFE NIT

## (undated) DEVICE — CATHETER URET 5FR L70CM OPN END SGL LUMN INJ HUB FLEXIMA

## (undated) DEVICE — STERILE LATEX POWDER FREE SURGICAL GLOVES WITH HYDROGEL COATING: Brand: PROTEXIS

## (undated) DEVICE — INFLATION DEVICE: Brand: ENCORE™ 26

## (undated) DEVICE — BALLOON DILATATION CATHETER: Brand: UROMAX ULTRA

## (undated) DEVICE — PAD,EYE,1-5/8X2 5/8,STERILE,LF,1/PK: Brand: MEDLINE

## (undated) DEVICE — SURGICAL PROCEDURE PACK CYTOSCOPY

## (undated) DEVICE — GLOVE SURG SZ 75 CRM LTX FREE POLYISOPRENE POLYMER BEAD ANTI

## (undated) DEVICE — BAG DRNGE COMB PK

## (undated) DEVICE — FIBER LSR 365UM 6J 80HZ 120W D F L FOR LITHO MOSES